# Patient Record
Sex: FEMALE | Race: WHITE | NOT HISPANIC OR LATINO | Employment: FULL TIME | URBAN - METROPOLITAN AREA
[De-identification: names, ages, dates, MRNs, and addresses within clinical notes are randomized per-mention and may not be internally consistent; named-entity substitution may affect disease eponyms.]

---

## 2017-01-11 ENCOUNTER — OFFICE VISIT (OUTPATIENT)
Dept: ENDOCRINOLOGY | Facility: CLINIC | Age: 37
End: 2017-01-11

## 2017-01-11 VITALS
DIASTOLIC BLOOD PRESSURE: 72 MMHG | OXYGEN SATURATION: 100 % | HEART RATE: 83 BPM | WEIGHT: 264 LBS | BODY MASS INDEX: 46.78 KG/M2 | SYSTOLIC BLOOD PRESSURE: 120 MMHG | HEIGHT: 63 IN

## 2017-01-11 DIAGNOSIS — E11.9 CONTROLLED TYPE 2 DIABETES MELLITUS WITHOUT COMPLICATION, WITHOUT LONG-TERM CURRENT USE OF INSULIN (HCC): ICD-10-CM

## 2017-01-11 DIAGNOSIS — E20.0 IDIOPATHIC HYPOPARATHYROIDISM (HCC): Primary | ICD-10-CM

## 2017-01-11 DIAGNOSIS — E78.5 HYPERLIPIDEMIA LDL GOAL <100: ICD-10-CM

## 2017-01-11 LAB
GLUCOSE BLDC GLUCOMTR-MCNC: 200 MG/DL (ref 70–130)
HBA1C MFR BLD: 7.2 %

## 2017-01-11 PROCEDURE — 99214 OFFICE O/P EST MOD 30 MIN: CPT | Performed by: INTERNAL MEDICINE

## 2017-01-11 PROCEDURE — 82947 ASSAY GLUCOSE BLOOD QUANT: CPT | Performed by: INTERNAL MEDICINE

## 2017-01-11 PROCEDURE — 83036 HEMOGLOBIN GLYCOSYLATED A1C: CPT | Performed by: INTERNAL MEDICINE

## 2017-01-11 RX ORDER — CALCITRIOL 0.25 UG/1
0.25 CAPSULE, LIQUID FILLED ORAL 2 TIMES DAILY
Qty: 60 CAPSULE | Refills: 11 | Status: SHIPPED | OUTPATIENT
Start: 2017-01-11 | End: 2018-01-10 | Stop reason: SDUPTHER

## 2017-01-11 RX ORDER — GLIPIZIDE 5 MG/1
5 TABLET ORAL 2 TIMES DAILY
Qty: 60 TABLET | Refills: 11 | Status: SHIPPED | OUTPATIENT
Start: 2017-01-11 | End: 2018-01-10 | Stop reason: SDUPTHER

## 2017-01-11 NOTE — PROGRESS NOTES
Chief Complaint   Patient presents with   • Diabetes     Follow UP    • Hypoparathyroidism     HPI:   Natty Fuentes is a 36 y.o.female who returns to [facility] Clinic for f/u evaluation of hypoparathyroidism, Type 2 DM, and hyperlipidemia. Last clinic visit 4/18/2016.  Her history is as follows:    Interim Events: no new medical problems    1) hypoparathyroidism:  - diagnosed in 2011 after presenting with symptomatic hypocalcemia  - etiology presumed infiltrative vs autoimmune hypoparathyroidism  - Taking calcitriol 0.25 mg BID, Caltrate + D 600 mg QID w/ food, occasionally needs extra tab for tingling  - Have asked on multiple visits, pt does not want to pursue Natpara  - last 24 hr urine calcium/Cr ratio: (5/23/2016) normal at 84    2) Type 2 DM with no associated complications at this time:  - diagnosed in 2013  - on metformin 1gm BID, tolerating but still with occasional loose stools. Did not feel XR helped her glucose control as well as plain  - on glipizide 5 mg BID AC  - Hgb A1C% 7.2 today  - no meter for review  (Januvia was too expensive. Pt did not tolerate Janumaet, pt did not tolerate Victoza)    3) Essential HTN  - managed by PCP  - on Lisinopril 40 mg daily, HCTZ 25 mg daily, Coreg 25 mg BID   - past evaluation (2016) for secondary causes of HTN showed no pathology (aldosterone/renin -normal, 1 mg dex suppression test - normal    4) hypertriglyceridemia: not clear how significant her TG's were in the past (ie >500 or < 500). Pt on fenofibrate prior to seeing me  - on fenofibrate 145 mg daily  - pravastatin 40 mg daily  - (8/17/2016) LDL 64 on pravastatin 40 mg daily    Other medical history: gout, SANYA on CPAP, GERD     Review of Systems   Constitutional: Negative.         Weight gain   HENT: Negative.    Eyes: Negative.    Respiratory: Negative.    Cardiovascular: Negative.    Gastrointestinal: Negative.    Endocrine: Negative.    Genitourinary: Negative.    Musculoskeletal: Positive for arthralgias.  "  Skin: Negative.    Allergic/Immunologic: Negative.    Neurological: Negative.    Hematological: Negative.    Psychiatric/Behavioral: Negative.        The following portions of the patient's history were reviewed and updated as appropriate: allergies, current medications, past family history, past medical history, past social history, past surgical history and problem list.    Visit Vitals   • /72   • Pulse 83   • Ht 63\" (160 cm)   • Wt 264 lb (120 kg)   • SpO2 100%   • BMI 46.77 kg/m2     Physical Exam   Constitutional: She is oriented to person, place, and time. She appears well-developed. No distress.   obese   HENT:   Head: Normocephalic.   Mouth/Throat: Oropharynx is clear and moist.   Eyes: Conjunctivae and EOM are normal. Pupils are equal, round, and reactive to light.   Neck: No tracheal deviation present. No thyromegaly present.   No palpable thyroid nodules     Cardiovascular: Normal rate, regular rhythm and normal heart sounds.    No murmur heard.  Pulmonary/Chest: Effort normal and breath sounds normal. No respiratory distress.   Abdominal: Soft. Bowel sounds are normal. She exhibits no mass. There is no tenderness.   Musculoskeletal: She exhibits no edema.   Lymphadenopathy:     She has no cervical adenopathy.   Neurological: She is alert and oriented to person, place, and time. No cranial nerve deficit.   Skin: Skin is warm and dry. She is not diaphoretic. No erythema.   No acanthosis nigricans   Psychiatric: She has a normal mood and affect. Her behavior is normal.   Vitals reviewed.    LABS/IMAGING:   Outside labs (01/09/2017):  CMP: Calcium 7.7, alb 4.2, LFT's WNL, Cr. 0.52    Office Visit on 01/11/2017   Component Date Value Ref Range Status   • Glucose 01/11/2017 200* 70 - 130 mg/dL Final   • Hemoglobin A1C 01/11/2017 7.2  % Final     ASSESSMENT/PLAN:  1) hypoparathyroidism:   - d/w pt that goal calcium is 7.0 - 8.5 to minimize calcium-phosphorus product deposition  - continue calcitriol " 0.25 mg BID, Caltrate + D 600 mg QID w/ food  - pt is not interested in Natpara  - pt to have renal function panel, 24 hr urine calcium/creatinine prior to next visit (lab slip given to pt)    2) Type 2 DM with no associated complications: fair control, Hgb A1C% today 7.2  - pt does not want to try invokana  (Januvia was too expensive. Pt did not tolerate Janumaet, pt did not tolerate Victoza)  - continue metformin 1 gm BID  - continue glipizide 5 mg BID AC    3) essential HTN: controlled on current medications  - on Lisinopril 40 mg daily, HCTZ 25 mg daily, Coreg 25 mg BID     4) mixed hyperlipidemia:  - will continue pravastatin 40 mg daily given her h/o DM   -  not clear how significant her TG's were in the past (ie >500 or < 500). Pt on fenofibrate prior to seeing me. Asked pt to stop fenofibrate 145 mg so I can evaluate if truly indicated  - check lipid panel before next visit     RTC 6 months

## 2017-01-11 NOTE — MR AVS SNAPSHOT
Natty MARIE Fuentes   1/11/2017 2:40 PM   Office Visit    Dept Phone:  [phone]   Encounter #:  [id number]    Provider:  Therese Cintron MD   Department:  [facility] INTERNAL MEDICINE AND ENDOCRINOLOGY                Your Full Care Plan              Today's Medication Changes          These changes are accurate as of: 1/11/17  1:56 PM.  If you have any questions, ask your nurse or doctor.               Medication(s)that have changed:     pravastatin 40 MG tablet   Commonly known as:  PRAVACHOL   Take 1 tablet by mouth daily for 360 days.   What changed:  Another medication with the same name was removed. Continue taking this medication, and follow the directions you see here.            Where to Get Your Medications      These medications were sent to [place], [place] - [address] [place] [address] [phone] [facility] [phone]   [address] [place] [address]     Phone:  [phone]     calcitriol 0.25 MCG capsule    glipiZIDE 5 MG tablet                  Your Updated Medication List          This list is accurate as of: 1/11/17  1:56 PM.  Always use your most recent med list.                allopurinol 300 MG tablet   Commonly known as:  ZYLOPRIM       calcitriol 0.25 MCG capsule   Commonly known as:  ROCALTROL   Take 1 capsule by mouth 2 (Two) Times a Day.       CALTRATE 600 PLUS-VIT D PO       carvedilol 25 MG tablet   Commonly known as:  COREG   Take 1 tablet by mouth 2 (two) times a day with meals for 360 days.       fenofibrate 145 MG tablet   Commonly known as:  TRICOR   Take 1 tablet by mouth daily for 360 days.       glipiZIDE 5 MG tablet   Commonly known as:  GLUCOTROL   Take 1 tablet by mouth 2 (Two) Times a Day. Take 30 minutes before breakfast and before dinner       hydrochlorothiazide 25 MG tablet   Commonly known as:  HYDRODIURIL   Take 1 tablet by mouth daily for 360 days.       lisinopril 40 MG tablet   Commonly known as:  PRINIVIL,ZESTRIL   Take 1 tablet  by mouth daily for 360 days.       metFORMIN 1000 MG tablet   Commonly known as:  GLUCOPHAGE       pravastatin 40 MG tablet   Commonly known as:  PRAVACHOL   Take 1 tablet by mouth daily for 360 days.       PROTONIX 40 MG EC tablet   Generic drug:  pantoprazole               We Performed the Following     Basic metabolic panel     POC Glucose Fingerstick     POC Glycosylated Hemoglobin (Hb A1C)       You Were Diagnosed With        Codes Comments    Idiopathic hypoparathyroidism    -  Primary ICD-10-CM: E20.0  ICD-9-CM: 252.1     Controlled type 2 diabetes mellitus without complication, without long-term current use of insulin     ICD-10-CM: E11.9  ICD-9-CM: 250.00     Localized edema     ICD-10-CM: R60.0  ICD-9-CM: 782.3     Hyperlipidemia LDL goal <100     ICD-10-CM: E78.5  ICD-9-CM: 272.4     Uncontrolled type 2 diabetes mellitus without complication, without long-term current use of insulin     ICD-10-CM: E11.65  ICD-9-CM: 250.02       Instructions     None    Patient Instructions History      Upcoming Appointments     Visit Type Date Time Department    FOLLOW UP 1/11/2017  2:40 PM [facility]    FOLLOW UP 7/10/2017  1:00 PM [facility]      MyChart Signup     Our records indicate that you have an active [employer] account.    You can view your After Visit Summary by going to [employer] and logging in with your [employer] username and password.  If you don't have a [employer] username and password but a parent or guardian has access to your record, the parent or guardian should login with their own [employer] username and password and access your record to view the After Visit Summary.    If you have questions, you can email "[email or web address] or call [phone] to talk to our [employer] staff.  Remember, [employer] is NOT to be used for urgent needs.  For medical emergencies, dial 911.               Other Info from Your Visit           Your Appointments     Jan 11, 2017  2:40 PM EST  "  Follow Up with Therese Cintron MD   [facility] INTERNAL MEDICINE AND ENDOCRINOLOGY (--)    [address]  [facility] [address] [phone]           Arrive 15 minutes prior to appointment.            Jul 10, 2017  1:00 PM EDT   Follow Up with Therese Cintron MD   [facility] INTERNAL MEDICINE AND ENDOCRINOLOGY (--)    [address]  [facility] [id number] [phone]           Arrive 15 minutes prior to appointment.              Allergies     Penicillins Allergy Rash    Rocephin [Ceftriaxone] Allergy Rash      Reason for Visit     Diabetes Follow UP     Hypoparathyroidism           Vital Signs     Blood Pressure Pulse Height Weight Oxygen Saturation Body Mass Index    120/72 83 63\" (160 cm) 264 lb (120 kg) 100% 46.77 kg/m2    Smoking Status                   Never Smoker           Problems and Diagnoses Noted     Hyperlipidemia LDL goal <100    Disorder of parathyroid gland    Controlled type 2 diabetes mellitus without complication, without long-term current use of insulin        Accumulation of fluid in tissues        Uncontrolled type 2 diabetes mellitus without complication, without long-term current use of insulin          Results     POC Glucose Fingerstick      Component Value Standard Range & Units    Glucose 200 70 - 130 mg/dL                POC Glycosylated Hemoglobin (Hb A1C)      Component Value Standard Range & Units    Hemoglobin A1C 7.2 %                    "

## 2017-07-03 LAB
ALBUMIN SERPL-MCNC: 3.7 G/DL (ref 3.5–5.5)
AMBIG ABBREV LP DEFAULT: NORMAL
AMBIG ABBREV RP10 DEFAULT: NORMAL
BUN SERPL-MCNC: 8 MG/DL (ref 6–20)
BUN/CREAT SERPL: 12 (ref 9–23)
CALCIUM SERPL-MCNC: 7.4 MG/DL (ref 8.7–10.2)
CHLORIDE SERPL-SCNC: 99 MMOL/L (ref 96–106)
CHOLEST SERPL-MCNC: 172 MG/DL (ref 100–199)
CO2 SERPL-SCNC: 27 MMOL/L (ref 18–29)
CREAT SERPL-MCNC: 0.65 MG/DL (ref 0.57–1)
GLUCOSE SERPL-MCNC: 192 MG/DL (ref 65–99)
HDLC SERPL-MCNC: 31 MG/DL
LDLC SERPL CALC-MCNC: ABNORMAL MG/DL (ref 0–99)
PHOSPHATE SERPL-MCNC: 5.4 MG/DL (ref 2.5–4.5)
POTASSIUM SERPL-SCNC: 3.4 MMOL/L (ref 3.5–5.2)
SODIUM SERPL-SCNC: 139 MMOL/L (ref 134–144)
TRIGL SERPL-MCNC: 576 MG/DL (ref 0–149)
VLDLC SERPL CALC-MCNC: ABNORMAL MG/DL (ref 5–40)

## 2017-07-04 LAB
CALCIUM 24H UR-MCNC: 10.7 MG/DL
CALCIUM 24H UR-MRATE: 64.2 MG/24 HR (ref 100–300)
CREAT 24H UR-MRATE: 1729 MG/24 HR (ref 800–1800)
CREAT UR-MCNC: 247 MG/DL

## 2017-07-10 ENCOUNTER — OFFICE VISIT (OUTPATIENT)
Dept: ENDOCRINOLOGY | Facility: CLINIC | Age: 37
End: 2017-07-10

## 2017-07-10 VITALS
HEIGHT: 63 IN | OXYGEN SATURATION: 100 % | DIASTOLIC BLOOD PRESSURE: 82 MMHG | BODY MASS INDEX: 46 KG/M2 | WEIGHT: 259.6 LBS | SYSTOLIC BLOOD PRESSURE: 130 MMHG | HEART RATE: 78 BPM

## 2017-07-10 DIAGNOSIS — E78.5 HYPERLIPIDEMIA LDL GOAL <100: ICD-10-CM

## 2017-07-10 DIAGNOSIS — E20.0 IDIOPATHIC HYPOPARATHYROIDISM (HCC): Primary | ICD-10-CM

## 2017-07-10 DIAGNOSIS — E11.9 TYPE 2 DIABETES MELLITUS WITHOUT COMPLICATION, WITHOUT LONG-TERM CURRENT USE OF INSULIN (HCC): ICD-10-CM

## 2017-07-10 LAB
GLUCOSE BLDC GLUCOMTR-MCNC: 108 MG/DL (ref 70–130)
HBA1C MFR BLD: 7.3 %

## 2017-07-10 PROCEDURE — 82947 ASSAY GLUCOSE BLOOD QUANT: CPT | Performed by: INTERNAL MEDICINE

## 2017-07-10 PROCEDURE — 83036 HEMOGLOBIN GLYCOSYLATED A1C: CPT | Performed by: INTERNAL MEDICINE

## 2017-07-10 PROCEDURE — 99214 OFFICE O/P EST MOD 30 MIN: CPT | Performed by: INTERNAL MEDICINE

## 2017-07-10 RX ORDER — FENOFIBRATE 145 MG/1
145 TABLET, COATED ORAL DAILY
Qty: 90 TABLET | Refills: 3 | Status: SHIPPED | OUTPATIENT
Start: 2017-07-10 | End: 2018-07-11 | Stop reason: SDUPTHER

## 2017-07-10 RX ORDER — ALLOPURINOL 300 MG/1
300 TABLET ORAL DAILY
COMMUNITY
Start: 2016-04-18

## 2017-07-10 RX ORDER — PANTOPRAZOLE SODIUM 40 MG/1
40 TABLET, DELAYED RELEASE ORAL DAILY
COMMUNITY
Start: 2016-04-18 | End: 2023-03-13 | Stop reason: ALTCHOICE

## 2017-07-10 RX ORDER — CARVEDILOL PHOSPHATE 20 MG/1
CAPSULE, EXTENDED RELEASE ORAL
COMMUNITY
Start: 2016-04-18 | End: 2017-07-10 | Stop reason: DRUGHIGH

## 2017-07-10 NOTE — PROGRESS NOTES
Chief Complaint   Patient presents with   • Diabetes     Follow UP      HPI:   Natty Fuentes is a 36 y.o.female who returns to Endocrine Clinic for f/u evaluation of hypoparathyroidism, Type 2 DM, and hyperlipidemia. Last clinic visit 01/11/2017.  Her history is as follows:    Interim Events: had food allergy testing recently and is allergic to multiple foods    1) idiopathic hypoparathyroidism:  - diagnosed in 2011 after presenting with symptomatic hypocalcemia  - etiology presumed infiltrative vs autoimmune hypoparathyroidism  - Taking calcitriol 0.25 mg BID, Caltrate + D 600 mg QID w/ food, occasionally needs extra tab for tingling  - Have asked on multiple visits, pt does not want to pursue Natpara  - last 24 hr urine calcium/Cr ratio: (07/2017) normal 0.003  - (07/2017) renal fxn panel: K+ 3.4, Calcium 7.4, phos 5.4, Alb 3.7    2) Type 2 DM with no associated complications at this time:  - diagnosed in 2013  - on metformin 1gm BID  - on glipizide 5 mg BID AC - but mainly takes 5 mg ac dinner, skips morning dose on work days  - Hgb A1C% 7.3 today  - no meter for review  (Januvia was too expensive. Pt did not tolerate Janumaet, pt did not tolerate Victoza)    3) Essential HTN  - managed by PCP  - on Lisinopril 40 mg daily, HCTZ 25 mg daily, Coreg 25 mg BID   - past evaluation (2016) for secondary causes of HTN showed no pathology (aldosterone/renin -normal, 1 mg dex suppression test - normal    4) mixed hyperlipidemia:   Lipid Panel (07/2017): Tchol 172,  (off fenofibrate), HDL 31, LDL not calc   - pravastatin 40 mg daily  - (8/17/2016) LDL 64 on pravastatin 40 mg daily    Other medical history: gout, SANYA on CPAP, GERD     Review of Systems   Constitutional: Negative.         Weight loss   HENT: Negative.    Eyes: Negative.    Respiratory: Negative.    Cardiovascular: Negative.    Gastrointestinal: Negative.    Endocrine: Negative.    Genitourinary: Negative.    Musculoskeletal: Positive for arthralgias.  "  Skin: Negative.    Allergic/Immunologic: Negative.    Neurological: Negative.    Hematological: Negative.    Psychiatric/Behavioral: Negative.      The following portions of the patient's history were reviewed and updated as appropriate: allergies, current medications, past family history, past medical history, past social history, past surgical history and problem list.    /82  Pulse 78  Ht 63\" (160 cm)  Wt 259 lb 9.6 oz (118 kg)  SpO2 100%  BMI 45.99 kg/m2  Physical Exam   Constitutional: She is oriented to person, place, and time. She appears well-developed. No distress.   obese   HENT:   Head: Normocephalic.   Mouth/Throat: Oropharynx is clear and moist.   Eyes: Conjunctivae and EOM are normal. Pupils are equal, round, and reactive to light.   Neck: No tracheal deviation present. No thyromegaly present.   No palpable thyroid nodules     Cardiovascular: Normal rate, regular rhythm and normal heart sounds.    No murmur heard.  Pulmonary/Chest: Effort normal and breath sounds normal. No respiratory distress.   Abdominal: Soft. Bowel sounds are normal. She exhibits no mass. There is no tenderness.   Musculoskeletal: She exhibits no edema.   Lymphadenopathy:     She has no cervical adenopathy.   Neurological: She is alert and oriented to person, place, and time. No cranial nerve deficit.   Skin: Skin is warm and dry. She is not diaphoretic. No erythema.   No acanthosis nigricans   Psychiatric: She has a normal mood and affect. Her behavior is normal.   Vitals reviewed.    LABS/IMAGING:  Outside labs reviewed and summarized in HPI    Office Visit on 07/10/2017   Component Date Value Ref Range Status   • Glucose 07/10/2017 108  70 - 130 mg/dL Final   • Hemoglobin A1C 07/10/2017 7.3  % Final     ASSESSMENT/PLAN:  1) idiopathic hypoparathyroidism:   - d/w pt that goal calcium is 7.0 - 8.5 to minimize calcium-phosphorus product deposition  - continue calcitriol 0.25 mg BID, Caltrate + D 600 mg QID w/ food  - " pt is not interested in Natpara  - pt to have renal function panel, 24 hr urine calcium/creatinine at goal at this time    2) Type 2 DM with no associated complications: fair control, Hgb A1C% today 7.3  - pt does not want to try invokana  (Januvia was too expensive. Pt did not tolerate Janumaet, pt did not tolerate Victoza)  - continue metformin 1 gm BID  - continue glipizide 5 mg BID AC, instructed pt to take 2.5 mg with breakfast on work days, stop drinking or cut down regular sodas    3) mixed hyperlipidemia:  - will continue pravastatin 40 mg daily given her h/o DM   - restart fenofibrate 145 mg daily     4) essential HTN: controlled on current medications  - on Lisinopril 40 mg daily, HCTZ 25 mg daily, Coreg 25 mg BID        RTC 6 months

## 2018-01-10 ENCOUNTER — OFFICE VISIT (OUTPATIENT)
Dept: ENDOCRINOLOGY | Facility: CLINIC | Age: 38
End: 2018-01-10

## 2018-01-10 VITALS
HEART RATE: 79 BPM | WEIGHT: 251 LBS | OXYGEN SATURATION: 98 % | DIASTOLIC BLOOD PRESSURE: 74 MMHG | SYSTOLIC BLOOD PRESSURE: 126 MMHG | BODY MASS INDEX: 44.46 KG/M2

## 2018-01-10 DIAGNOSIS — E78.2 MIXED HYPERLIPIDEMIA: ICD-10-CM

## 2018-01-10 DIAGNOSIS — IMO0001 UNCONTROLLED TYPE 2 DIABETES MELLITUS WITHOUT COMPLICATION, WITHOUT LONG-TERM CURRENT USE OF INSULIN: ICD-10-CM

## 2018-01-10 DIAGNOSIS — E20.0 IDIOPATHIC HYPOPARATHYROIDISM (HCC): Primary | ICD-10-CM

## 2018-01-10 DIAGNOSIS — I10 ESSENTIAL HYPERTENSION: ICD-10-CM

## 2018-01-10 PROCEDURE — 99214 OFFICE O/P EST MOD 30 MIN: CPT | Performed by: INTERNAL MEDICINE

## 2018-01-10 RX ORDER — CALCITRIOL 0.25 UG/1
0.25 CAPSULE, LIQUID FILLED ORAL
Qty: 60 CAPSULE | Refills: 11 | Status: SHIPPED | OUTPATIENT
Start: 2018-01-10 | End: 2019-01-17 | Stop reason: SDUPTHER

## 2018-01-10 RX ORDER — GLIPIZIDE 5 MG/1
5 TABLET ORAL
Qty: 60 TABLET | Refills: 11 | Status: SHIPPED | OUTPATIENT
Start: 2018-01-10 | End: 2018-07-11 | Stop reason: SDUPTHER

## 2018-01-10 NOTE — PROGRESS NOTES
Chief Complaint   Patient presents with   • Hypoparathyroidism     follow up   • Type 2 diabetes     follow up, A1C 8.8 on 12/21/17     HPI:   Natty Fuentes is a 37 y.o.female who returns to Endocrine Clinic for f/u evaluation of hypoparathyroidism, Type 2 DM, and hyperlipidemia. Last clinic visit 07/10/2017.  Her history is as follows:    Interim Events:   - reports more food excursions, regular soda leading to more hyperglycemia    1) idiopathic hypoparathyroidism:  - diagnosed in 2011 after presenting with symptomatic hypocalcemia  - etiology presumed infiltrative vs autoimmune hypoparathyroidism  - Taking calcitriol 0.25 mg BID, Caltrate + D 600 mg QID w/ food, occasionally needs extra tab for tingling  - Have asked on multiple visits, pt does not want to pursue Natpara  - last 24 hr urine calcium/Cr ratio: (07/2017) normal 0.003  - (01/2018) CMP: K+ 3.6, Calcium 7.8, Alb 4.1    2) Type 2 DM with no associated complications at this time:  - diagnosed in 2013  - on metformin 1gm BID  - on glipizide 5 mg BID AC   - Hgb A1C% 8.8 12/21/2017  - no meter for review  (Januvia was too expensive. Pt did not tolerate Janumet, pt did not tolerate Victoza), does not want to try SGLT-2 inhibitor    DM Health Maintenance:  Ophtho: Last visit - 2017, no retinopathy per pt  Podiatry: Last visit - no recent visit  Monofilament / Foot exam: due  Lipids: see below  Urine albumin/Cr ratio: (12/2017) high 269.1  TSH: (2016) normal at 1.040  Aspirin: n/a    3) Essential HTN  - managed by PCP  - pt taking lisinopril 4 mg daily, carvedilol 25 mg BID  - Takes HCTZ as needed for lower extremity edema  - past evaluation (2016) for secondary causes of HTN showed no endocrine pathology (aldosterone/renin -normal, 1 mg dex suppression test -normal)    4) mixed hyperlipidemia:   Lipid Panel (12/2017): Tchol 227, TG 1557(off fenofibrate), HDL 21, LDL not calc   - pravastatin 40 mg, fenofibrate 145 mg daily  - (8/17/2016) LDL 64 on  pravastatin 40 mg daily    Other medical history: gout, SANYA on CPAP, GERD     Review of Systems   Constitutional: Negative.         Weight loss   HENT: Negative.    Eyes: Negative.    Respiratory: Negative.    Cardiovascular: Negative.    Gastrointestinal: Negative.    Endocrine: Negative.    Genitourinary: Negative.    Musculoskeletal: Positive for arthralgias.   Skin: Negative.    Allergic/Immunologic: Negative.    Neurological: Negative.    Hematological: Negative.    Psychiatric/Behavioral: Negative.      The following portions of the patient's history were reviewed and updated as appropriate: allergies, current medications, past family history, past medical history, past social history, past surgical history and problem list.    /74  Pulse 79  Wt 114 kg (251 lb)  SpO2 98%  BMI 44.46 kg/m2  Physical Exam   Constitutional: She is oriented to person, place, and time. She appears well-developed. No distress.   obese   HENT:   Head: Normocephalic.   Mouth/Throat: Oropharynx is clear and moist.   Eyes: Conjunctivae and EOM are normal. Pupils are equal, round, and reactive to light.   Neck: No tracheal deviation present. No thyromegaly present.   No palpable thyroid nodules     Cardiovascular: Normal rate, regular rhythm and normal heart sounds.    No murmur heard.  Pulmonary/Chest: Effort normal and breath sounds normal. No respiratory distress.   Abdominal: Soft. Bowel sounds are normal. She exhibits no mass. There is no tenderness.   Musculoskeletal: She exhibits no edema.   Lymphadenopathy:     She has no cervical adenopathy.   Neurological: She is alert and oriented to person, place, and time. No cranial nerve deficit.   Skin: Skin is warm and dry. She is not diaphoretic. No erythema.   No acanthosis nigricans   Psychiatric: She has a normal mood and affect. Her behavior is normal.   Vitals reviewed.    LABS/IMAGING:  Outside labs reviewed and summarized in HPI    ASSESSMENT/PLAN:  1) idiopathic  hypoparathyroidism: controlled  - d/w pt that goal calcium is 7.0 - 8.5 to minimize calcium-phosphorus product deposition  - continue calcitriol 0.25 mg BID, Caltrate + D 600 mg QID w/ food  - pt is not interested in Natpara  - 24 hr urine calcium/creatinine due 07/2018    2) Type 2 DM with microalbuminuria: uncontrolled, recent Hgb A1C% 8.8  - pt does not want to try invokana  (Januvia was too expensive. Pt did not tolerate Janumaet, pt did not tolerate Victoza)  - continue metformin 1 gm BID  - advised pt to increase glipizide to 10 mg BID AC. She does not want to, but would rather work on her diet.   - instructed pt to stop drinking regular sodas    3) mixed hyperlipidemia:  - continue pravastatin 40 mg daily given her h/o DM   - continue fenofibrate 145 mg daily consistently     4) essential HTN: controlled  - on Lisinopril 40 mg daily, HCTZ 25 mg prn, and Coreg 25 mg BID     RTC 6 months

## 2018-01-28 RX ORDER — PRAVASTATIN SODIUM 40 MG
40 TABLET ORAL DAILY
Qty: 90 TABLET | Refills: 3 | Status: SHIPPED | OUTPATIENT
Start: 2018-01-28 | End: 2020-04-22 | Stop reason: SDUPTHER

## 2018-01-28 RX ORDER — LISINOPRIL 40 MG/1
20 TABLET ORAL DAILY
COMMUNITY
End: 2022-05-01

## 2018-01-28 RX ORDER — CARVEDILOL 25 MG/1
25 TABLET ORAL 2 TIMES DAILY WITH MEALS
COMMUNITY
End: 2019-09-03

## 2018-01-28 RX ORDER — PRAVASTATIN SODIUM 40 MG
40 TABLET ORAL DAILY
COMMUNITY
End: 2018-01-28 | Stop reason: SDUPTHER

## 2018-01-28 RX ORDER — HYDROCHLOROTHIAZIDE 25 MG/1
25 TABLET ORAL AS NEEDED
COMMUNITY
End: 2021-04-19 | Stop reason: SDUPTHER

## 2018-05-17 ENCOUNTER — TELEPHONE (OUTPATIENT)
Dept: INTERNAL MEDICINE | Facility: CLINIC | Age: 38
End: 2018-05-17

## 2018-05-17 NOTE — TELEPHONE ENCOUNTER
MISS IVORY CALLED THIS EVENING WANTING TO SPEAK WITH DR MEDEL. SHE WAS OUT OF THE OFFICE.   MISS MAUROLOR WANTED TO SPEAK WITH HER ABOUT HER SPLINE AND LIVER BEING ENLARGED AS WELL AS HER THYROID BEING SWOLLEN.   I SENT DR MEDEL A TEXT MESSAGE AS IT SOUNDED URGENT. DR MEDEL HAS INSTRUCTED THE P.A.R STAFF TO CALL HER IF ANYTHING SEEMS URGENT.   DR MEDEL SPOKE WITH THE PATIENT  AND SHE JUST WANTED TO INFORM HER OF HER RESULTS.

## 2018-06-01 ENCOUNTER — TELEPHONE (OUTPATIENT)
Dept: INTERNAL MEDICINE | Facility: CLINIC | Age: 38
End: 2018-06-01

## 2018-06-01 NOTE — TELEPHONE ENCOUNTER
PT CALLED IN TO LEAVE A MESSAGE FOR DR. MEDEL OR AVAILABLE MA. THE PT HAD A SCHEDULED APT FOR A 3-4 MO F/U WITH DR. MEDEL ON THE 30th BUT DUE TO PERSONAL REASONS HAD TO MISS APT. THE PT WANTED TO SEE IF DR. MEDEL RECOMMENDED THE PT WAIT A FEW MONTHS OUT TO SEE DR. MEDEL OR IF IT WAS RECOMMENDED THE PT COME IN EARLIER TO BE SEEN BY PA. PT BEST CALL BACK # [phone]

## 2018-06-04 NOTE — TELEPHONE ENCOUNTER
Okay to schedule the patient in June or July with me only. Okay to overbook. July, please call patient to make the appointment.    Thanks  Therese Cintron MD

## 2018-07-07 LAB
ALBUMIN SERPL-MCNC: 4.1 G/DL (ref 3.5–5.5)
ALBUMIN/CREAT UR: 10.4 MG/G CREAT (ref 0–30)
AMBIG ABBREV LP DEFAULT: NORMAL
AMBIG ABBREV RP10 DEFAULT: NORMAL
BUN SERPL-MCNC: 10 MG/DL (ref 6–20)
BUN/CREAT SERPL: 16 (ref 9–23)
CALCIUM SERPL-MCNC: 8.1 MG/DL (ref 8.7–10.2)
CHLORIDE SERPL-SCNC: 101 MMOL/L (ref 96–106)
CHOLEST SERPL-MCNC: 208 MG/DL (ref 100–199)
CO2 SERPL-SCNC: 25 MMOL/L (ref 20–29)
CREAT SERPL-MCNC: 0.63 MG/DL (ref 0.57–1)
CREAT UR-MCNC: 140.8 MG/DL
GLUCOSE SERPL-MCNC: 257 MG/DL (ref 65–99)
HBA1C MFR BLD: 7.9 % (ref 4.8–5.6)
HDLC SERPL-MCNC: 29 MG/DL
LDLC SERPL CALC-MCNC: ABNORMAL MG/DL (ref 0–99)
MICROALBUMIN UR-MCNC: 14.7 UG/ML
PHOSPHATE SERPL-MCNC: 4.4 MG/DL (ref 2.5–4.5)
POTASSIUM SERPL-SCNC: 4.1 MMOL/L (ref 3.5–5.2)
SODIUM SERPL-SCNC: 136 MMOL/L (ref 134–144)
TRIGL SERPL-MCNC: 985 MG/DL (ref 0–149)
TSH SERPL DL<=0.005 MIU/L-ACNC: 1.19 UIU/ML (ref 0.45–4.5)
VLDLC SERPL CALC-MCNC: ABNORMAL MG/DL (ref 5–40)

## 2018-07-11 ENCOUNTER — OFFICE VISIT (OUTPATIENT)
Dept: ENDOCRINOLOGY | Facility: CLINIC | Age: 38
End: 2018-07-11

## 2018-07-11 VITALS
WEIGHT: 259 LBS | SYSTOLIC BLOOD PRESSURE: 110 MMHG | BODY MASS INDEX: 45.89 KG/M2 | DIASTOLIC BLOOD PRESSURE: 70 MMHG | HEIGHT: 63 IN | OXYGEN SATURATION: 98 % | HEART RATE: 84 BPM

## 2018-07-11 DIAGNOSIS — E20.0 IDIOPATHIC HYPOPARATHYROIDISM (HCC): Primary | ICD-10-CM

## 2018-07-11 DIAGNOSIS — IMO0001 UNCONTROLLED TYPE 2 DIABETES MELLITUS WITHOUT COMPLICATION, WITHOUT LONG-TERM CURRENT USE OF INSULIN: ICD-10-CM

## 2018-07-11 DIAGNOSIS — E78.2 MIXED HYPERLIPIDEMIA: ICD-10-CM

## 2018-07-11 PROCEDURE — 99214 OFFICE O/P EST MOD 30 MIN: CPT | Performed by: INTERNAL MEDICINE

## 2018-07-11 RX ORDER — GLIPIZIDE 5 MG/1
10 TABLET ORAL
Qty: 120 TABLET | Refills: 11 | Status: SHIPPED | OUTPATIENT
Start: 2018-07-11 | End: 2019-01-17 | Stop reason: SDUPTHER

## 2018-07-11 RX ORDER — ASPIRIN 81 MG/1
81 TABLET ORAL DAILY
COMMUNITY
End: 2022-09-09 | Stop reason: HOSPADM

## 2018-07-11 RX ORDER — FENOFIBRATE 145 MG/1
145 TABLET, COATED ORAL DAILY
Qty: 90 TABLET | Refills: 3 | Status: SHIPPED | OUTPATIENT
Start: 2018-07-11 | End: 2019-08-01 | Stop reason: SDUPTHER

## 2018-07-11 NOTE — PROGRESS NOTES
Chief Complaint   Patient presents with   • Idiopathic hypoparathyroidism     f/u   • type 2 diabetes   • Hyperlipidemia     HPI:   Natty Fuentes is a 37 y.o.female who returns to [facility] Clinic for f/u evaluation of hypoparathyroidism, Type 2 DM, and hyperlipidemia. Last clinic visit 01/10/2018.  Her history is as follows:    Interim Events:   - was seen by hematology for enlarged spleen. Per pt no clear etiology  - has been compliant with all her medications  - has been trying to eat a lower carb diet but frustrated with lack of weight loss and increased TGs    1) idiopathic hypoparathyroidism:  - diagnosed in 2011 after presenting with symptomatic hypocalcemia  - etiology presumed infiltrative vs autoimmune hypoparathyroidism  - Taking calcitriol 0.25 mg BID, Caltrate + D 600 mg QID w/ food, occasionally needs extra tab for tingling  - Have asked on multiple visits, pt does not want to pursue Natpara  - last 24 hr urine calcium/Cr ratio: (07/2017) normal 0.003  - (07/2018) CMP: K+ 4.1, Calcium 8.1, Alb 4.1    2) Type 2 DM with no associated complications at this time:  - diagnosed in 2013  - on metformin 1gm BID  - on glipizide 5 mg BID AC   - Hgb A1C% 7.9 07/2018  - no meter for review  (Januvia was too expensive. Pt did not tolerate Janumet, pt did not tolerate Victoza), does not want to try once weekly GLP-1), does not want to try SGLT-2 inhibitor    DM Health Maintenance:  Ophtho: Last visit - 2017, no retinopathy per pt  Podiatry: Last visit - no recent visit  Monofilament / Foot exam: due  Lipids: see below  Urine microalbumin/Cr ratio: (07/2018) 10.4 - normal  TSH: (07/2018) normal at 1.190  Aspirin: n/a    3) Essential HTN  - managed by PCP  - pt taking lisinopril 40 mg daily, carvedilol 25 mg BID  - Takes HCTZ 25 mg daily  - past evaluation (2016) for secondary causes of HTN showed no endocrine pathology (aldosterone/renin -normal, 1 mg dex suppression test -normal)    4) mixed hyperlipidemia:  "  Lipid Panel (07/2018): Tchol 208, (on fenofibrate), HDL 29, LDL not calc   - pravastatin 40 mg, fenofibrate 145 mg daily  - (8/17/2016) LDL 64 on pravastatin 40 mg daily    Other medical history: gout, SANYA on CPAP, GERD     Review of Systems   Constitutional: Negative.         Weight stable   HENT: Negative.    Eyes: Negative.    Respiratory: Negative.    Cardiovascular: Negative.    Gastrointestinal: Negative.    Endocrine: Negative.    Genitourinary: Negative.    Musculoskeletal: Positive for arthralgias.   Skin: Negative.    Allergic/Immunologic: Negative.    Neurological: Negative.    Hematological: Negative.    Psychiatric/Behavioral: Negative.      The following portions of the patient's history were reviewed and updated as appropriate: allergies, current medications, past family history, past medical history, past social history, past surgical history and problem list.    /70   Pulse 84   Ht 160 cm (63\")   Wt 117 kg (259 lb)   SpO2 98%   BMI 45.88 kg/m²   Physical Exam   Constitutional: She is oriented to person, place, and time. She appears well-developed. No distress.   obese   HENT:   Head: Normocephalic.   Mouth/Throat: Oropharynx is clear and moist.   Eyes: Conjunctivae and EOM are normal. Pupils are equal, round, and reactive to light.   Neck: No tracheal deviation present. No thyromegaly present.   No palpable thyroid nodules     Cardiovascular: Normal rate, regular rhythm and normal heart sounds.    No murmur heard.  Pulmonary/Chest: Effort normal and breath sounds normal. No respiratory distress.   Abdominal: Soft. Bowel sounds are normal. She exhibits no mass. There is no tenderness.   Musculoskeletal: She exhibits no edema.   Lymphadenopathy:     She has no cervical adenopathy.   Neurological: She is alert and oriented to person, place, and time. No cranial nerve deficit.   Skin: Skin is warm and dry. She is not diaphoretic. No erythema.   No acanthosis nigricans   Psychiatric: " She has a normal mood and affect. Her behavior is normal.   Vitals reviewed.    LABS/IMAGING:  Outside labs reviewed and summarized in HPI    ASSESSMENT/PLAN:  1) idiopathic hypoparathyroidism: controlled  - d/w pt that goal calcium is 7.0 - 8.5 to minimize calcium-phosphorus product deposition  - continue calcitriol 0.25 mg BID, Caltrate + D 600 mg QID w/ food  - pt is not interested in Natpara  - 24 hr urine calcium/creatinine due 2018    2) Type 2 DM w/o complication at this time: uncontrolled, recent Hgb A1C% 7.9  - pt does not want to try invokana  (Januvia was too expensive. Pt did not tolerate Janumaet, pt did not tolerate Victoza) does not want to try once weekly GLP-1  - continue metformin 1 gm BID  - advised pt to increase glipizide to 10 mg BID AC    3) mixed hyperlipidemia: uncontrolled triglycerides  - continue pravastatin 40 mg daily given her h/o DM   - continue fenofibrate 145 mg daily consistently   - offered Rx for Rx fish oil  (Lovaza or Vascepa) pt declined due to cost. Pt does not want to take OTC fish oil    4) essential HTN: controlled  - on Lisinopril 40 mg daily, HCTZ 25 mg daily, and Coreg 25 mg BID     RTC 6 months

## 2019-01-07 ENCOUNTER — TELEPHONE (OUTPATIENT)
Dept: INTERNAL MEDICINE | Facility: CLINIC | Age: 39
End: 2019-01-07

## 2019-01-07 NOTE — TELEPHONE ENCOUNTER
PT CALLED AND STATED THAT SHE HAS AN APPT ON 01/14 FOR HER THYROID; SHE STATED THAT SHE NORMALLY HAS LAB WORK DONE 1 WEEK PRIOR TO APPT; SINCE SHE LIVES SO FAR AWAY, SHE HAS THEM DONE AT A LOCAL LAB; SHE IS WANTING TO KNOW IF SHE NEEDS TO HAVE THEM DONE AND IF ORDERS CAN BE FAXED TO OFFICE; PLEASE CALL  PT AND ADVISE [phone]

## 2019-01-08 DIAGNOSIS — E20.0 IDIOPATHIC HYPOPARATHYROIDISM (HCC): ICD-10-CM

## 2019-01-08 DIAGNOSIS — E78.2 MIXED HYPERLIPIDEMIA: ICD-10-CM

## 2019-01-08 DIAGNOSIS — E11.9 TYPE 2 DIABETES MELLITUS WITHOUT COMPLICATION, WITHOUT LONG-TERM CURRENT USE OF INSULIN (HCC): Primary | ICD-10-CM

## 2019-01-08 NOTE — TELEPHONE ENCOUNTER
Ramsey,     I have ordered labs for Natty ,(CMP, A1C%, Lipid panel, and 24 hour urine for calcium and creatinine). Please let her know to complete these labs before her visit and find out where to fax the order slip.    Thanks  MD

## 2019-01-08 NOTE — TELEPHONE ENCOUNTER
Called patient on mobile and voice mail has not been set up. Called home number LVM for patient to return the call to go over labs and to get a fax number to send to patient. Office number given.

## 2019-01-09 NOTE — TELEPHONE ENCOUNTER
LVM for patient to return the call concerning labs. Office number given. I was able to get vm on home number

## 2019-01-09 NOTE — TELEPHONE ENCOUNTER
Tried calling patient cell and home number with no answer and no voice mail. Will contact patient later

## 2019-01-12 LAB
ALBUMIN SERPL-MCNC: 4 G/DL (ref 3.5–5.5)
ALBUMIN/GLOB SERPL: 1.8 {RATIO} (ref 1.2–2.2)
ALP SERPL-CCNC: 59 IU/L (ref 39–117)
ALT SERPL-CCNC: 13 IU/L (ref 0–32)
AMBIG ABBREV CMP14 DEFAULT: NORMAL
AMBIG ABBREV LP DEFAULT: NORMAL
AST SERPL-CCNC: 16 IU/L (ref 0–40)
BILIRUB SERPL-MCNC: 0.5 MG/DL (ref 0–1.2)
BUN SERPL-MCNC: 10 MG/DL (ref 6–20)
BUN/CREAT SERPL: 15 (ref 9–23)
CALCIUM SERPL-MCNC: 8 MG/DL (ref 8.7–10.2)
CHLORIDE SERPL-SCNC: 101 MMOL/L (ref 96–106)
CHOLEST SERPL-MCNC: 175 MG/DL (ref 100–199)
CO2 SERPL-SCNC: 25 MMOL/L (ref 20–29)
CREAT SERPL-MCNC: 0.66 MG/DL (ref 0.57–1)
GLOBULIN SER CALC-MCNC: 2.2 G/DL (ref 1.5–4.5)
GLUCOSE SERPL-MCNC: 150 MG/DL (ref 65–99)
HBA1C MFR BLD: 8.2 % (ref 4.8–5.6)
HDLC SERPL-MCNC: 27 MG/DL
LDLC SERPL CALC-MCNC: ABNORMAL MG/DL (ref 0–99)
POTASSIUM SERPL-SCNC: 4.3 MMOL/L (ref 3.5–5.2)
PROT SERPL-MCNC: 6.2 G/DL (ref 6–8.5)
SODIUM SERPL-SCNC: 136 MMOL/L (ref 134–144)
TRIGL SERPL-MCNC: 1215 MG/DL (ref 0–149)
VLDLC SERPL CALC-MCNC: ABNORMAL MG/DL (ref 5–40)

## 2019-01-14 ENCOUNTER — OFFICE VISIT (OUTPATIENT)
Dept: ENDOCRINOLOGY | Facility: CLINIC | Age: 39
End: 2019-01-14

## 2019-01-14 VITALS
OXYGEN SATURATION: 98 % | WEIGHT: 265 LBS | SYSTOLIC BLOOD PRESSURE: 122 MMHG | BODY MASS INDEX: 46.95 KG/M2 | HEIGHT: 63 IN | HEART RATE: 81 BPM | DIASTOLIC BLOOD PRESSURE: 80 MMHG

## 2019-01-14 DIAGNOSIS — E20.0 IDIOPATHIC HYPOPARATHYROIDISM (HCC): ICD-10-CM

## 2019-01-14 DIAGNOSIS — E78.2 MIXED HYPERLIPIDEMIA: ICD-10-CM

## 2019-01-14 DIAGNOSIS — E11.65 UNCONTROLLED TYPE 2 DIABETES MELLITUS WITH HYPERGLYCEMIA (HCC): Primary | ICD-10-CM

## 2019-01-14 PROCEDURE — 99214 OFFICE O/P EST MOD 30 MIN: CPT | Performed by: INTERNAL MEDICINE

## 2019-01-14 NOTE — PROGRESS NOTES
Chief Complaint   Patient presents with   • Idiopathic hypoparathyroidism     f/u      HPI:   Natty Fuentes is a 38 y.o.female who returns to Endocrine Clinic for f/u evaluation of hypoparathyroidism, Type 2 DM, and hyperlipidemia. Last clinic visit 07/11/2018.  Her history is as follows:    Interim Events:   - has been compliant with all her medications  - 2 cervical lymph nodes removed from right neck: no malignancy, pt reports nodes were purulent    1) idiopathic hypoparathyroidism:  - diagnosed in 2011 after presenting with symptomatic hypocalcemia  - etiology presumed infiltrative vs autoimmune hypoparathyroidism  - Taking calcitriol 0.25 mg BID, Caltrate + D 600 mg QID w/ food, occasionally needs extra tab for tingling  - Have asked on multiple visits, pt does not want to pursue Natpara  - last 24 hr urine calcium/Cr ratio: (07/2017) normal 0.003  - (01/11/2019) CMP: K+ 4.3, Calcium 8.0, Alb 4.0, Cr 0.66,     2) Type 2 DM with no associated complications at this time:  - diagnosed in 2013  - on metformin 1gm BID  - on glipizide 10 mg BID AC   - Hgb A1C% 8.2 on 1/11/2019  - no meter for review  (Januvia was too expensive. Pt did not tolerate Janumet, pt did not tolerate Victoza), does not want to try SGLT-2 inhibitor    DM Health Maintenance:  Ophtho: Last visit - 2017, no retinopathy per pt  Podiatry: Last visit - no recent visit  Monofilament / Foot exam: due  Lipids: see below  Urine microalbumin/Cr ratio: (07/2018) 10.4 - normal  TSH: (07/2018) normal at 1.190  Aspirin: n/a    3) Essential HTN  - managed by PCP  - pt taking lisinopril 40 mg daily, carvedilol 25 mg BID  - Takes HCTZ 25 mg daily  - past evaluation (2016) for secondary causes of HTN showed no endocrine pathology (aldosterone/renin -normal, 1 mg dex suppression test -normal)    4) mixed hyperlipidemia:   Lipid Panel (01/2019): Tchol 175, TG 1215(on fenofibrate), HDL 27, LDL not calc   - pravastatin 40 mg, fenofibrate 145 mg daily  -  "(8/17/2016) LDL 64 on pravastatin 40 mg daily    Other medical history: gout, SANYA on CPAP, GERD     Review of Systems   Constitutional: Negative.         Mild weight gain   HENT: Negative.    Eyes: Negative.    Respiratory: Negative.    Cardiovascular: Negative.    Gastrointestinal: Negative.    Endocrine: Negative.    Genitourinary: Negative.    Musculoskeletal: Positive for arthralgias.   Skin: Negative.    Allergic/Immunologic: Negative.    Neurological: Negative.    Hematological: Negative.    Psychiatric/Behavioral: Negative.      The following portions of the patient's history were reviewed and updated as appropriate: allergies, current medications, past family history, past medical history, past social history, past surgical history and problem list.    /80   Pulse 81   Ht 160 cm (63\")   Wt 120 kg (265 lb)   SpO2 98%   BMI 46.94 kg/m²   Physical Exam   Constitutional: She is oriented to person, place, and time. She appears well-developed. No distress.   obese   HENT:   Head: Normocephalic.   Mouth/Throat: Oropharynx is clear and moist.   Eyes: Conjunctivae and EOM are normal. Pupils are equal, round, and reactive to light.   Neck: No tracheal deviation present. No thyromegaly present.   No palpable thyroid nodules     Cardiovascular: Normal rate, regular rhythm and normal heart sounds.   No murmur heard.  Pulmonary/Chest: Effort normal and breath sounds normal. No respiratory distress.   Abdominal: Soft. Bowel sounds are normal. She exhibits no mass. There is no tenderness.   Musculoskeletal: She exhibits no edema.   Lymphadenopathy:     She has no cervical adenopathy.   Neurological: She is alert and oriented to person, place, and time. No cranial nerve deficit.   Skin: Skin is warm and dry. She is not diaphoretic. No erythema.   No acanthosis nigricans   Psychiatric: She has a normal mood and affect. Her behavior is normal.   Vitals reviewed.    LABS/IMAGING:  Outside labs reviewed and summarized " in HPI    ASSESSMENT/PLAN:  1) idiopathic hypoparathyroidism: controlled  - d/w pt that goal calcium is 7.0 - 8.5 to minimize calcium-phosphorus product deposition  - continue calcitriol 0.25 mg BID, Caltrate + D 600 mg QID w/ food  - pt is not interested in Natpara  - 24 hr urine calcium/creatinine due, lab slip given to pt    2) Type 2 DM w/o complication at this time: uncontrolled, recent Hgb A1C% 8.2  - pt does not want to try invokana  (Januvia was too expensive. Pt did not tolerate Janumaet, pt did not tolerate Victoza)   - pt agreeable to try Ozempic. Samples given pt to start at 0.25 mg weekly and then increase to 0.5 mg weekly. Pt to call if tolerated for Rx.   - continue metformin 1 gm BID  - continue glipizide to 10 mg BID AC    3) mixed hyperlipidemia: uncontrolled triglycerides. Uncontrolled hyperglycemia contributing  - continue pravastatin 40 mg daily given her h/o DM   - continue fenofibrate 145 mg daily consistently   - offered Rx for Rx fish oil  (Lovaza or Vascepa) pt declined due to cost. Pt does not want to take OTC fish oil  - advised to follow low fat diet    4) essential HTN: controlled  - on Lisinopril 40 mg daily, HCTZ 25 mg daily, and Coreg 25 mg BID     RTC 6 months

## 2019-01-17 DIAGNOSIS — IMO0001 UNCONTROLLED TYPE 2 DIABETES MELLITUS WITHOUT COMPLICATION, WITHOUT LONG-TERM CURRENT USE OF INSULIN: ICD-10-CM

## 2019-01-17 DIAGNOSIS — E20.0 IDIOPATHIC HYPOPARATHYROIDISM (HCC): ICD-10-CM

## 2019-01-17 RX ORDER — GLIPIZIDE 5 MG/1
10 TABLET ORAL
Qty: 120 TABLET | Refills: 11 | Status: SHIPPED | OUTPATIENT
Start: 2019-01-17 | End: 2020-01-29

## 2019-01-17 RX ORDER — CALCITRIOL 0.25 UG/1
0.25 CAPSULE, LIQUID FILLED ORAL
Qty: 60 CAPSULE | Refills: 11 | Status: SHIPPED | OUTPATIENT
Start: 2019-01-17 | End: 2020-04-22 | Stop reason: DRUGHIGH

## 2019-01-17 NOTE — TELEPHONE ENCOUNTER
PATIENT WAS IN ON 1/14/19 AND DR MEDEL TOLD HER SHE WAS GOING TO SEND HER MEDS TO HER PHARMACY, PT SAYS THEY ARE NOT THERE. CAN SHE PLEASE HAVE REFILLS ON CALCITROL AND GLIPIZIDE CALLED INTO [facility] IN [facility]. PT CAN BE REACHED -[phone]

## 2019-03-26 ENCOUNTER — TELEPHONE (OUTPATIENT)
Dept: INTERNAL MEDICINE | Facility: CLINIC | Age: 39
End: 2019-03-26

## 2019-03-26 NOTE — TELEPHONE ENCOUNTER
Pt is experiencing pain in her jaw for about 2 weeks) and her blood sugar is around 400 all the time (for the past 3 weeks.)    Pt is requesting that you call her back     [phone]

## 2019-03-27 DIAGNOSIS — E11.65 UNCONTROLLED TYPE 2 DIABETES MELLITUS WITH HYPERGLYCEMIA (HCC): Primary | ICD-10-CM

## 2019-03-27 NOTE — TELEPHONE ENCOUNTER
Spoke to patient. Started Jardiance 25 mg daily.Advised to take ibuprofen 600 mg TID for next 5 days.Will see her in clinic 3/29/2019 at 12 pm  Therese Cintron MD

## 2019-03-29 ENCOUNTER — OFFICE VISIT (OUTPATIENT)
Dept: ENDOCRINOLOGY | Facility: CLINIC | Age: 39
End: 2019-03-29

## 2019-03-29 VITALS
HEIGHT: 63 IN | BODY MASS INDEX: 46.95 KG/M2 | DIASTOLIC BLOOD PRESSURE: 78 MMHG | HEART RATE: 74 BPM | OXYGEN SATURATION: 98 % | WEIGHT: 265 LBS | SYSTOLIC BLOOD PRESSURE: 126 MMHG

## 2019-03-29 DIAGNOSIS — R59.0 SUPRACLAVICULAR ADENOPATHY: Primary | ICD-10-CM

## 2019-03-29 DIAGNOSIS — E78.2 MIXED HYPERLIPIDEMIA: ICD-10-CM

## 2019-03-29 DIAGNOSIS — I10 ESSENTIAL HYPERTENSION: ICD-10-CM

## 2019-03-29 DIAGNOSIS — E11.65 UNCONTROLLED TYPE 2 DIABETES MELLITUS WITH HYPERGLYCEMIA (HCC): ICD-10-CM

## 2019-03-29 DIAGNOSIS — E06.1 THYROIDITIS, SUBACUTE: ICD-10-CM

## 2019-03-29 DIAGNOSIS — E20.0 IDIOPATHIC HYPOPARATHYROIDISM (HCC): ICD-10-CM

## 2019-03-29 DIAGNOSIS — E04.9 NONTOXIC NODULAR GOITER: ICD-10-CM

## 2019-03-29 PROCEDURE — 99215 OFFICE O/P EST HI 40 MIN: CPT | Performed by: INTERNAL MEDICINE

## 2019-03-29 PROCEDURE — 76536 US EXAM OF HEAD AND NECK: CPT | Performed by: INTERNAL MEDICINE

## 2019-03-29 RX ORDER — METHYLPREDNISOLONE 4 MG/1
TABLET ORAL
Qty: 21 TABLET | Refills: 1 | Status: SHIPPED | OUTPATIENT
Start: 2019-03-29 | End: 2020-04-22

## 2019-04-03 LAB
T4 FREE SERPL-MCNC: 1.21 NG/DL (ref 0.82–1.77)
TSH SERPL DL<=0.005 MIU/L-ACNC: 1.26 UIU/ML (ref 0.45–4.5)

## 2019-08-01 DIAGNOSIS — E78.2 MIXED HYPERLIPIDEMIA: ICD-10-CM

## 2019-08-01 RX ORDER — FENOFIBRATE 145 MG/1
TABLET, COATED ORAL
Qty: 30 TABLET | Refills: 0 | Status: SHIPPED | OUTPATIENT
Start: 2019-08-01 | End: 2019-08-29 | Stop reason: SDUPTHER

## 2019-08-29 DIAGNOSIS — E78.2 MIXED HYPERLIPIDEMIA: ICD-10-CM

## 2019-08-29 RX ORDER — FENOFIBRATE 145 MG/1
145 TABLET, COATED ORAL DAILY
Qty: 90 TABLET | Refills: 3 | Status: SHIPPED | OUTPATIENT
Start: 2019-08-29 | End: 2020-09-01

## 2019-09-03 ENCOUNTER — OFFICE VISIT (OUTPATIENT)
Dept: ENDOCRINOLOGY | Facility: CLINIC | Age: 39
End: 2019-09-03

## 2019-09-03 VITALS
WEIGHT: 257.3 LBS | BODY MASS INDEX: 45.58 KG/M2 | HEART RATE: 78 BPM | OXYGEN SATURATION: 99 % | DIASTOLIC BLOOD PRESSURE: 64 MMHG | SYSTOLIC BLOOD PRESSURE: 108 MMHG

## 2019-09-03 DIAGNOSIS — E78.2 MIXED HYPERLIPIDEMIA: ICD-10-CM

## 2019-09-03 DIAGNOSIS — I10 ESSENTIAL HYPERTENSION: ICD-10-CM

## 2019-09-03 DIAGNOSIS — E20.0 IDIOPATHIC HYPOPARATHYROIDISM (HCC): Primary | ICD-10-CM

## 2019-09-03 DIAGNOSIS — E04.9 NODULAR GOITER: ICD-10-CM

## 2019-09-03 DIAGNOSIS — E11.65 TYPE 2 DIABETES MELLITUS WITH HYPERGLYCEMIA, WITHOUT LONG-TERM CURRENT USE OF INSULIN (HCC): ICD-10-CM

## 2019-09-03 LAB
GLUCOSE BLDC GLUCOMTR-MCNC: 159 MG/DL (ref 70–130)
HBA1C MFR BLD: 7.8 %

## 2019-09-03 PROCEDURE — 83036 HEMOGLOBIN GLYCOSYLATED A1C: CPT | Performed by: INTERNAL MEDICINE

## 2019-09-03 PROCEDURE — 82947 ASSAY GLUCOSE BLOOD QUANT: CPT | Performed by: INTERNAL MEDICINE

## 2019-09-03 PROCEDURE — 99214 OFFICE O/P EST MOD 30 MIN: CPT | Performed by: INTERNAL MEDICINE

## 2019-09-03 RX ORDER — METOPROLOL SUCCINATE 25 MG/1
12.5 TABLET, EXTENDED RELEASE ORAL DAILY
Qty: 45 TABLET | Refills: 3 | Status: SHIPPED | OUTPATIENT
Start: 2019-09-03 | End: 2019-10-23 | Stop reason: SDUPTHER

## 2019-09-03 NOTE — PROGRESS NOTES
Chief Complaint   Patient presents with   • Follow-up     DM 2   • Follow-up     Idiopathic Hypoparathyroidism     HPI:   Natty Fuentes is a 38 y.o.female who returns to Endocrine Clinic for f/u evaluation of multiple medical problems. Last clinic visit 03/29/2019.  Her history is as follows:    Interim Events:   - saw Dr. Valentino, her left neck swelling and pain have resolved. Thought to have neck/shoulder injury. No evidence of LAD  - thyroiditis pain at last visit has resolved   - tolerating Jardiance: no infection    1) idiopathic hypoparathyroidism:  - diagnosed in 2011 after presenting with symptomatic hypocalcemia  - etiology presumed infiltrative vs autoimmune hypoparathyroidism  - Taking calcitriol 0.25 mg BID, Caltrate + D 600 mg QID w/ food, occasionally needs extra tab for tingling  - On HCTZ 25 mg daily for HTN: no calciuria on this Rx  - Have asked on multiple visits, pt does not want to pursue Natpara. Natpara currently unavailable due to national recall   - last 24 hr urine calcium/Cr ratio: (07/2017) normal 0.003  - (03/13/2019) CMP: K+ 4.7, Calcium 7.9, Alb 4.0, Cr 0.76     2) Type 2 DM with no associated complications at this time:  - diagnosed in 2013  - did not tolerate Ozempic, Victoza samples  - did not tolerate Januvia     Current Medications:  - metformin 1gm BID  - glipizide 10 mg BID AC   - Jardiance 25 mg daily    - no meter for review    DM Health Maintenance:  Ophtho: Last visit - 2017, no retinopathy per pt  Podiatry: Last visit - no recent visit  Monofilament / Foot exam: due  Lipids: see below  Urine microalbumin/Cr ratio: (07/2018) 10.4 - normal  TSH: (04/2019) normal at 1.260  Aspirin: 81 mg daily - per her PCP    3) Essential HTN:  - pt taking lisinopril 40 mg daily, carvediolol mg bid , HCTZ 25 mg daily  - past evaluation (2016) for secondary causes of HTN showed no endocrine pathology (aldosterone/renin -normal, 1 mg dex suppression test -normal)  - States that carvediolol  "causing pulse to be too low, lightheadedness.     4) mixed hyperlipidemia:   - not consistently following a low fat diet  Lipid Panel (01/2019): Tchol 175, TG 1215(on fenofibrate), HDL 27, LDL not calc   - taking pravastatin 40 mg, fenofibrate 145 mg daily  - (8/17/2016) LDL 64 on pravastatin 40 mg daily    5) nontoxic nodular goiter:   (10/04/2011): Pt had a thyroid US and US-Guided FNA of a right lobe/isthmus nodule by  Endocrinology. The US report described a \" nodule measuring 1.74 x 1.06 x 1.31 cm in size is complex, hypoechoic, with indistinct borders. A few small calcifications without coma tail. It has a complex cystic-solid consistency and a grade II vascularity.\"    (10/04/2011): Cytology Report: \"DIAGNOSIS ULTRASOUND GUIDED FNA, RIGHT THYROID NODULE:  ABUNDANT COLLOID, RARE  MACROPHAGES, AND SCANT FOLLICULAR GROUPS\"    - Repeat thyroid US was completed in clinic 03/2019:  The thyroid gland was mildly enlarged in size by measured dimensions. Finding are consistent with nodular goiter. No discrete nodules were identified in the left lobe. At the junction of the right lobe and isthmus, a 0.99(L) x 0.68(AP) x 0.80(T) cm hypoechoic mixed cystic and solid nodule was identified. The nodule's margin was not well defined. The nodule had minimal peripheral vascularity and no microcalcifications. Course linear calcifications were present. Although imaging was not available for comparison, the nodule appeared to be significantly smaller compared to the outside US report description from 10/2011 and had similar imaging characteristics. Two slightly enlarged left, level III cervical lymph nodes were identified with US characteristics suggestive of benign lymph nodes as described above. No pathologic lymph nodes were seen. Specifically, no superficial mass or enlarged lymph nodes were noted in the left supraclavicular area where patient describes pain and swelling.   RECOMMENDATIONS: Repeat Thyroid US recommended if " changes in the gland/neck are noted on physical exam    Other medical history: gout, SANYA on CPAP, GERD     Review of Systems   Constitutional: Negative.         Mild weight loss   HENT: Negative.  Negative for sore throat and trouble swallowing.    Eyes: Negative.    Respiratory: Negative.    Cardiovascular: Negative.    Gastrointestinal: Negative.    Endocrine: Negative.    Genitourinary: Negative.    Musculoskeletal: Positive for arthralgias.   Skin: Negative.    Allergic/Immunologic: Negative.    Neurological: Negative.    Hematological: Negative.    Psychiatric/Behavioral: Negative.      The following portions of the patient's history were reviewed and updated as appropriate: allergies, current medications, past family history, past medical history, past social history, past surgical history and problem list.    /64   Pulse 78   Wt 117 kg (257 lb 4.8 oz)   SpO2 99%   Breastfeeding? No   BMI 45.58 kg/m²   Physical Exam   Constitutional: She is oriented to person, place, and time. She appears well-developed. No distress.   obese   HENT:   Head: Normocephalic.   Mouth/Throat: Oropharynx is clear and moist.   Eyes: Conjunctivae and EOM are normal. Pupils are equal, round, and reactive to light.   Neck: No tracheal deviation present. Thyromegaly (mild) present.   No palpable thyroid nodules. No tenderness to palpation     Cardiovascular: Normal rate, regular rhythm and normal heart sounds.   No murmur heard.  Pulmonary/Chest: Effort normal and breath sounds normal. No respiratory distress.   Abdominal: Soft. Bowel sounds are normal. She exhibits no mass. There is no tenderness.   Musculoskeletal: She exhibits no edema.   Lymphadenopathy:     She has no cervical adenopathy.   Neurological: She is alert and oriented to person, place, and time. No cranial nerve deficit.   Skin: Skin is warm and dry. She is not diaphoretic. No erythema.   No acanthosis nigricans   Psychiatric: She has a normal mood and affect.  Her behavior is normal.   Vitals reviewed.    LABS/IMAGING:  Outside labs reviewed and summarized in HPI  Lab Results   Component Value Date    TSH 1.260 04/02/2019     Office Visit on 09/03/2019   Component Date Value Ref Range Status   • Glucose 09/03/2019 159* 70 - 130 mg/dL Final   • Hemoglobin A1C 09/03/2019 7.8  % Final       ASSESSMENT/PLAN:  1) idiopathic hypoparathyroidism: controlled  - d/w pt that goal calcium is 7.0 - 8.5 to minimize calcium-phosphorus product deposition  - continue calcitriol 0.25 mg BID, Caltrate + D 600 mg QID w/ food  - pt is not interested in Natpara  - 24 hr urine calcium/creatinine due.     2) Type 2 DM w/o complication at this time: fair control, Hgb A1C% 7.8 today  (Pt did not tolerate Janumaet, pt did not tolerate Victoza, Ozempic)   - continue Jardiance 25 mg daily  - continue metformin 1 gm BID  - continue glipizide to 10 mg BID AC  - encouraged pt to follow a carb consistent meal plan    3) mixed hyperlipidemia: uncontrolled  - continue pravastatin 40 mg daily given her h/o DM   - continue fenofibrate 145 mg daily consistently   - offered Rx for Rx fish oil  (Lovaza or Vascepa) pt declined due to cost. Pt does not want to take OTC fish oil  - advised to follow low fat diet    4) essential HTN: controlled, however, pt reporting light-headedness with the carvedilol  - will d/c carvedilol. Start metoprolol ER 12.5 mg daily  - continue Lisinopril 40 mg daily, HCTZ 25 mg daily    5) nontoxic nodular goiter: stable on exam  - Thyroid US from 03/2019 reviewed  - Repeat US based on changes in physical exam    Lab slips for TSH, free T4, urine micro/alb ratio, lipid panel, CMP given to patient. To be completed before return visit.    RTC 6 months

## 2019-09-29 PROBLEM — E04.9 NODULAR GOITER: Status: ACTIVE | Noted: 2019-09-29

## 2019-10-23 DIAGNOSIS — E11.65 TYPE 2 DIABETES MELLITUS WITH HYPERGLYCEMIA, WITHOUT LONG-TERM CURRENT USE OF INSULIN (HCC): ICD-10-CM

## 2019-10-23 RX ORDER — METOPROLOL SUCCINATE 25 MG/1
25 TABLET, EXTENDED RELEASE ORAL DAILY
Qty: 90 TABLET | Refills: 3 | Status: SHIPPED | OUTPATIENT
Start: 2019-10-23 | End: 2020-10-05

## 2020-01-28 DIAGNOSIS — IMO0001 UNCONTROLLED TYPE 2 DIABETES MELLITUS WITHOUT COMPLICATION, WITHOUT LONG-TERM CURRENT USE OF INSULIN: ICD-10-CM

## 2020-01-29 RX ORDER — GLIPIZIDE 5 MG/1
TABLET ORAL
Qty: 120 TABLET | Refills: 3 | Status: SHIPPED | OUTPATIENT
Start: 2020-01-29 | End: 2020-07-02

## 2020-04-22 ENCOUNTER — OFFICE VISIT (OUTPATIENT)
Dept: ENDOCRINOLOGY | Facility: CLINIC | Age: 40
End: 2020-04-22

## 2020-04-22 VITALS — BODY MASS INDEX: 41.64 KG/M2 | WEIGHT: 235 LBS | HEIGHT: 63 IN

## 2020-04-22 DIAGNOSIS — IMO0001 UNCONTROLLED TYPE 2 DIABETES MELLITUS WITHOUT COMPLICATION, WITHOUT LONG-TERM CURRENT USE OF INSULIN: ICD-10-CM

## 2020-04-22 DIAGNOSIS — E11.65 UNCONTROLLED TYPE 2 DIABETES MELLITUS WITH HYPERGLYCEMIA (HCC): Primary | ICD-10-CM

## 2020-04-22 DIAGNOSIS — E20.0 IDIOPATHIC HYPOPARATHYROIDISM (HCC): ICD-10-CM

## 2020-04-22 DIAGNOSIS — I10 ESSENTIAL HYPERTENSION: ICD-10-CM

## 2020-04-22 DIAGNOSIS — E78.2 MIXED HYPERLIPIDEMIA: ICD-10-CM

## 2020-04-22 PROCEDURE — 99442 PR PHYS/QHP TELEPHONE EVALUATION 11-20 MIN: CPT | Performed by: INTERNAL MEDICINE

## 2020-04-22 RX ORDER — INSULIN DETEMIR 100 [IU]/ML
20 INJECTION, SOLUTION SUBCUTANEOUS DAILY
Qty: 5 PEN | Refills: 5 | Status: SHIPPED | OUTPATIENT
Start: 2020-04-22 | End: 2020-10-11

## 2020-04-22 RX ORDER — PEN NEEDLE, DIABETIC 30 GX3/16"
1 NEEDLE, DISPOSABLE MISCELLANEOUS DAILY
Qty: 100 EACH | Refills: 3 | Status: SHIPPED | OUTPATIENT
Start: 2020-04-22 | End: 2020-09-28 | Stop reason: SDUPTHER

## 2020-04-22 RX ORDER — PRAVASTATIN SODIUM 40 MG
40 TABLET ORAL DAILY
Qty: 90 TABLET | Refills: 3 | Status: SHIPPED | OUTPATIENT
Start: 2020-04-22

## 2020-04-22 RX ORDER — CALCITRIOL 0.5 UG/1
0.5 CAPSULE, LIQUID FILLED ORAL DAILY
Qty: 90 CAPSULE | Refills: 3 | Status: SHIPPED | OUTPATIENT
Start: 2020-04-22 | End: 2021-05-11

## 2020-07-02 RX ORDER — GLIPIZIDE 5 MG/1
TABLET ORAL
Qty: 120 TABLET | Refills: 3 | Status: SHIPPED | OUTPATIENT
Start: 2020-07-02 | End: 2020-10-11

## 2020-09-01 DIAGNOSIS — E78.2 MIXED HYPERLIPIDEMIA: ICD-10-CM

## 2020-09-01 RX ORDER — FENOFIBRATE 145 MG/1
TABLET, COATED ORAL
Qty: 30 TABLET | Refills: 3 | Status: SHIPPED | OUTPATIENT
Start: 2020-09-01 | End: 2021-02-02

## 2020-09-28 ENCOUNTER — OFFICE VISIT (OUTPATIENT)
Dept: ENDOCRINOLOGY | Facility: CLINIC | Age: 40
End: 2020-09-28

## 2020-09-28 VITALS
HEART RATE: 63 BPM | HEIGHT: 63 IN | WEIGHT: 252 LBS | SYSTOLIC BLOOD PRESSURE: 138 MMHG | OXYGEN SATURATION: 98 % | DIASTOLIC BLOOD PRESSURE: 66 MMHG | BODY MASS INDEX: 44.65 KG/M2

## 2020-09-28 DIAGNOSIS — E20.0 IDIOPATHIC HYPOPARATHYROIDISM (HCC): ICD-10-CM

## 2020-09-28 DIAGNOSIS — E04.9 NODULAR GOITER: ICD-10-CM

## 2020-09-28 DIAGNOSIS — I10 ESSENTIAL HYPERTENSION: ICD-10-CM

## 2020-09-28 DIAGNOSIS — E11.65 UNCONTROLLED TYPE 2 DIABETES MELLITUS WITH HYPERGLYCEMIA (HCC): Primary | ICD-10-CM

## 2020-09-28 DIAGNOSIS — E78.2 MIXED HYPERLIPIDEMIA: ICD-10-CM

## 2020-09-28 LAB
EXPIRATION DATE: ABNORMAL
GLUCOSE BLDC GLUCOMTR-MCNC: 247 MG/DL (ref 70–130)
Lab: ABNORMAL

## 2020-09-28 PROCEDURE — 82947 ASSAY GLUCOSE BLOOD QUANT: CPT | Performed by: INTERNAL MEDICINE

## 2020-09-28 PROCEDURE — 99214 OFFICE O/P EST MOD 30 MIN: CPT | Performed by: INTERNAL MEDICINE

## 2020-09-28 RX ORDER — PEN NEEDLE, DIABETIC 30 GX3/16"
1 NEEDLE, DISPOSABLE MISCELLANEOUS
Qty: 150 EACH | Refills: 11 | Status: SHIPPED | OUTPATIENT
Start: 2020-09-28 | End: 2020-12-29

## 2020-10-05 DIAGNOSIS — E11.65 TYPE 2 DIABETES MELLITUS WITH HYPERGLYCEMIA, WITHOUT LONG-TERM CURRENT USE OF INSULIN (HCC): ICD-10-CM

## 2020-10-05 RX ORDER — METOPROLOL SUCCINATE 25 MG/1
TABLET, EXTENDED RELEASE ORAL
Qty: 90 TABLET | Refills: 3 | Status: SHIPPED | OUTPATIENT
Start: 2020-10-05 | End: 2021-04-07

## 2020-10-11 RX ORDER — INSULIN LISPRO 100 [IU]/ML
INJECTION, SOLUTION INTRAVENOUS; SUBCUTANEOUS
Qty: 5 PEN | Refills: 5 | Status: SHIPPED | OUTPATIENT
Start: 2020-10-11 | End: 2020-12-17 | Stop reason: DRUGHIGH

## 2020-10-11 RX ORDER — INSULIN DEGLUDEC INJECTION 100 U/ML
36 INJECTION, SOLUTION SUBCUTANEOUS NIGHTLY
Qty: 5 PEN | Refills: 5 | Status: SHIPPED | OUTPATIENT
Start: 2020-10-11 | End: 2020-10-20 | Stop reason: CLARIF

## 2020-10-20 ENCOUNTER — TELEPHONE (OUTPATIENT)
Dept: ENDOCRINOLOGY | Facility: CLINIC | Age: 40
End: 2020-10-20

## 2020-10-20 DIAGNOSIS — E11.65 UNCONTROLLED TYPE 2 DIABETES MELLITUS WITH HYPERGLYCEMIA (HCC): Primary | ICD-10-CM

## 2020-10-20 RX ORDER — INSULIN DETEMIR 100 [IU]/ML
50 INJECTION, SOLUTION SUBCUTANEOUS DAILY
Qty: 5 PEN | Refills: 11 | Status: SHIPPED | OUTPATIENT
Start: 2020-10-20 | End: 2020-11-13 | Stop reason: SDUPTHER

## 2020-10-20 NOTE — TELEPHONE ENCOUNTER
Spoke with Pharmacy and they stated Trulicity requires a PA. I can start it and see if I can get it covered?

## 2020-10-20 NOTE — TELEPHONE ENCOUNTER
Cristy called from [facility] pharmacy in regards to medication  change.. Since Tresiba FlexTouch 100 UNIT/ML solution pen-injector injection  wasn't covered on insurance, she was advised  to change it to Levemir. However, due to the switch, Cristy said the  dosage is supposed to be reduced.  She is requesting verification on if the dosage is needing to be changed or kept the same dose.

## 2020-10-20 NOTE — TELEPHONE ENCOUNTER
Auth for Tresiba is not covered with Medicaid. Pharmacy stated they would try others and see which one was covered. They will get back with us for the script.

## 2020-10-22 ENCOUNTER — LAB (OUTPATIENT)
Dept: LAB | Facility: HOSPITAL | Age: 40
End: 2020-10-22

## 2020-10-22 ENCOUNTER — LAB (OUTPATIENT)
Dept: ENDOCRINOLOGY | Facility: CLINIC | Age: 40
End: 2020-10-22

## 2020-10-22 DIAGNOSIS — E78.2 MIXED HYPERLIPIDEMIA: ICD-10-CM

## 2020-10-22 DIAGNOSIS — E11.65 UNCONTROLLED TYPE 2 DIABETES MELLITUS WITH HYPERGLYCEMIA (HCC): ICD-10-CM

## 2020-10-22 LAB
ALBUMIN SERPL-MCNC: 4.1 G/DL (ref 3.5–5.2)
ALBUMIN/GLOB SERPL: 1.7 G/DL
ALP SERPL-CCNC: 88 U/L (ref 39–117)
ALT SERPL W P-5'-P-CCNC: 13 U/L (ref 1–33)
ANION GAP SERPL CALCULATED.3IONS-SCNC: 15.2 MMOL/L (ref 5–15)
AST SERPL-CCNC: 13 U/L (ref 1–32)
BILIRUB SERPL-MCNC: 0.5 MG/DL (ref 0–1.2)
BUN SERPL-MCNC: 11 MG/DL (ref 6–20)
BUN/CREAT SERPL: 15.1 (ref 7–25)
CALCIUM SPEC-SCNC: 7.9 MG/DL (ref 8.6–10.5)
CHLORIDE SERPL-SCNC: 98 MMOL/L (ref 98–107)
CHOLEST SERPL-MCNC: 213 MG/DL (ref 0–200)
CO2 SERPL-SCNC: 24.8 MMOL/L (ref 22–29)
CREAT SERPL-MCNC: 0.73 MG/DL (ref 0.57–1)
DEPRECATED RDW RBC AUTO: 44.4 FL (ref 37–54)
ERYTHROCYTE [DISTWIDTH] IN BLOOD BY AUTOMATED COUNT: 15.7 % (ref 12.3–15.4)
GFR SERPL CREATININE-BSD FRML MDRD: 88 ML/MIN/1.73
GLOBULIN UR ELPH-MCNC: 2.4 GM/DL
GLUCOSE SERPL-MCNC: 267 MG/DL (ref 65–99)
HBA1C MFR BLD: 8.5 % (ref 4.8–5.6)
HCT VFR BLD AUTO: 37.8 % (ref 34–46.6)
HDLC SERPL-MCNC: ABNORMAL MG/DL
HGB BLD-MCNC: 12.6 G/DL (ref 12–15.9)
LDLC SERPL CALC-MCNC: ABNORMAL MG/DL
LDLC/HDLC SERPL: ABNORMAL {RATIO}
MCH RBC QN AUTO: 26.5 PG (ref 26.6–33)
MCHC RBC AUTO-ENTMCNC: 33.3 G/DL (ref 31.5–35.7)
MCV RBC AUTO: 79.4 FL (ref 79–97)
PLATELET # BLD AUTO: 278 10*3/MM3 (ref 140–450)
PMV BLD AUTO: 10.8 FL (ref 6–12)
POTASSIUM SERPL-SCNC: 4.1 MMOL/L (ref 3.5–5.2)
PROT SERPL-MCNC: 6.5 G/DL (ref 6–8.5)
RBC # BLD AUTO: 4.76 10*6/MM3 (ref 3.77–5.28)
SODIUM SERPL-SCNC: 138 MMOL/L (ref 136–145)
TRIGL SERPL-MCNC: 1555 MG/DL (ref 0–150)
VLDLC SERPL-MCNC: ABNORMAL MG/DL
WBC # BLD AUTO: 6.28 10*3/MM3 (ref 3.4–10.8)

## 2020-10-22 PROCEDURE — 80053 COMPREHEN METABOLIC PANEL: CPT | Performed by: INTERNAL MEDICINE

## 2020-10-22 PROCEDURE — 86341 ISLET CELL ANTIBODY: CPT | Performed by: INTERNAL MEDICINE

## 2020-10-22 PROCEDURE — 84681 ASSAY OF C-PEPTIDE: CPT | Performed by: INTERNAL MEDICINE

## 2020-10-22 PROCEDURE — 83036 HEMOGLOBIN GLYCOSYLATED A1C: CPT | Performed by: INTERNAL MEDICINE

## 2020-10-22 PROCEDURE — 82570 ASSAY OF URINE CREATININE: CPT | Performed by: INTERNAL MEDICINE

## 2020-10-22 PROCEDURE — 82043 UR ALBUMIN QUANTITATIVE: CPT | Performed by: INTERNAL MEDICINE

## 2020-10-22 PROCEDURE — 85027 COMPLETE CBC AUTOMATED: CPT | Performed by: INTERNAL MEDICINE

## 2020-10-22 PROCEDURE — 83721 ASSAY OF BLOOD LIPOPROTEIN: CPT

## 2020-10-22 PROCEDURE — 80061 LIPID PANEL: CPT | Performed by: INTERNAL MEDICINE

## 2020-10-23 LAB
ALBUMIN UR-MCNC: <1.2 MG/DL
ARTICHOKE IGE QN: 58 MG/DL (ref 0–100)
CREAT UR-MCNC: 80.3 MG/DL
MICROALBUMIN/CREAT UR: NORMAL MG/G{CREAT}

## 2020-10-26 LAB — C PEPTIDE SERPL-MCNC: 8.4 NG/ML (ref 1.1–4.4)

## 2020-10-29 ENCOUNTER — TELEPHONE (OUTPATIENT)
Dept: ENDOCRINOLOGY | Facility: CLINIC | Age: 40
End: 2020-10-29

## 2020-10-30 DIAGNOSIS — E11.65 UNCONTROLLED TYPE 2 DIABETES MELLITUS WITH HYPERGLYCEMIA (HCC): Primary | ICD-10-CM

## 2020-10-30 RX ORDER — PIOGLITAZONEHYDROCHLORIDE 30 MG/1
30 TABLET ORAL DAILY
Qty: 30 TABLET | Refills: 5 | Status: SHIPPED | OUTPATIENT
Start: 2020-10-30 | End: 2021-02-09 | Stop reason: SINTOL

## 2020-10-31 LAB — ISLET CELL512 AB SER-ACNC: <7.5 U/ML

## 2020-11-13 DIAGNOSIS — E11.65 UNCONTROLLED TYPE 2 DIABETES MELLITUS WITH HYPERGLYCEMIA (HCC): ICD-10-CM

## 2020-11-13 RX ORDER — CALCIUM CITRATE/VITAMIN D3 200MG-6.25
1 TABLET ORAL
Qty: 150 EACH | Refills: 5 | Status: SHIPPED | OUTPATIENT
Start: 2020-11-13

## 2020-11-13 RX ORDER — BLOOD-GLUCOSE METER
1 EACH MISCELLANEOUS TAKE AS DIRECTED
Qty: 1 DEVICE | Refills: 0 | Status: SHIPPED | OUTPATIENT
Start: 2020-11-13

## 2020-11-13 RX ORDER — INSULIN DETEMIR 100 [IU]/ML
INJECTION, SOLUTION SUBCUTANEOUS
Qty: 30 ML | Refills: 11 | Status: SHIPPED | OUTPATIENT
Start: 2020-11-13 | End: 2021-01-13

## 2020-11-20 ENCOUNTER — TELEPHONE (OUTPATIENT)
Dept: ENDOCRINOLOGY | Facility: CLINIC | Age: 40
End: 2020-11-20

## 2020-12-17 RX ORDER — INSULIN LISPRO 100 [IU]/ML
26 INJECTION, SOLUTION INTRAVENOUS; SUBCUTANEOUS
Qty: 75 ML | Refills: 3 | Status: SHIPPED | OUTPATIENT
Start: 2020-12-17 | End: 2021-11-12

## 2020-12-17 NOTE — TELEPHONE ENCOUNTER
Pharmacy called stating that on her medication she is taking 26 units instead of the 12 units and she needs a refill for 26 units so her insurance can cover it, please advise

## 2020-12-29 DIAGNOSIS — E11.65 UNCONTROLLED TYPE 2 DIABETES MELLITUS WITH HYPERGLYCEMIA (HCC): ICD-10-CM

## 2020-12-29 RX ORDER — PEN NEEDLE, DIABETIC 31 GX5/16"
1 NEEDLE, DISPOSABLE MISCELLANEOUS
Qty: 200 EACH | Refills: 5 | Status: SHIPPED | OUTPATIENT
Start: 2020-12-29 | End: 2022-01-04

## 2021-01-13 ENCOUNTER — TELEPHONE (OUTPATIENT)
Dept: ENDOCRINOLOGY | Facility: CLINIC | Age: 41
End: 2021-01-13

## 2021-01-13 DIAGNOSIS — E11.65 UNCONTROLLED TYPE 2 DIABETES MELLITUS WITH HYPERGLYCEMIA (HCC): Primary | ICD-10-CM

## 2021-01-13 RX ORDER — PROCHLORPERAZINE 25 MG/1
SUPPOSITORY RECTAL
Qty: 3 EACH | Refills: 11 | Status: SHIPPED | OUTPATIENT
Start: 2021-01-13 | End: 2022-01-25

## 2021-01-13 RX ORDER — PROCHLORPERAZINE 25 MG/1
1 SUPPOSITORY RECTAL
Qty: 1 EACH | Refills: 4 | Status: SHIPPED | OUTPATIENT
Start: 2021-01-13 | End: 2022-04-18

## 2021-01-13 RX ORDER — HUMAN INSULIN 100 [IU]/ML
INJECTION, SUSPENSION SUBCUTANEOUS
Qty: 30 ML | Refills: 11 | Status: SHIPPED | OUTPATIENT
Start: 2021-01-13 | End: 2021-04-14

## 2021-01-13 NOTE — TELEPHONE ENCOUNTER
[facility] CALLED STATING THAT THEY RECEIVED PRESCRIPTIONS FOR TRANSMITTER AND SENSORS. PHARM STATED THAT THEY NEED A PRESCRIPTION FOR THE  OR PROOF THE PT HAS ONE. PLEASE SEND IN AT EARLIEST CONVENIENCE.

## 2021-02-02 DIAGNOSIS — E11.65 UNCONTROLLED TYPE 2 DIABETES MELLITUS WITH HYPERGLYCEMIA (HCC): Primary | ICD-10-CM

## 2021-02-02 DIAGNOSIS — E78.2 MIXED HYPERLIPIDEMIA: ICD-10-CM

## 2021-02-02 RX ORDER — ORAL SEMAGLUTIDE 7 MG/1
1 TABLET ORAL EVERY MORNING
Qty: 30 TABLET | Refills: 11 | Status: SHIPPED | OUTPATIENT
Start: 2021-02-02 | End: 2021-07-14 | Stop reason: DRUGHIGH

## 2021-02-02 RX ORDER — FENOFIBRATE 145 MG/1
TABLET, COATED ORAL
Qty: 90 TABLET | Refills: 3 | Status: SHIPPED | OUTPATIENT
Start: 2021-02-02

## 2021-02-09 DIAGNOSIS — E11.65 UNCONTROLLED TYPE 2 DIABETES MELLITUS WITH HYPERGLYCEMIA (HCC): Primary | ICD-10-CM

## 2021-02-23 ENCOUNTER — TELEPHONE (OUTPATIENT)
Dept: ENDOCRINOLOGY | Facility: CLINIC | Age: 41
End: 2021-02-23

## 2021-02-23 NOTE — TELEPHONE ENCOUNTER
PT RETURNED OUR CALL IN RGARDS TO PREVIOUS NOTE. PT STATED THAT SHE DOES NOT USE A  BUT INSTEAD USES HER PHONE. PLEASE REACH OUT TO PT AT EARLIEST CONVENIENCE. THANK YOU.

## 2021-03-29 ENCOUNTER — LAB (OUTPATIENT)
Dept: LAB | Facility: HOSPITAL | Age: 41
End: 2021-03-29

## 2021-03-29 ENCOUNTER — OFFICE VISIT (OUTPATIENT)
Dept: ENDOCRINOLOGY | Facility: CLINIC | Age: 41
End: 2021-03-29

## 2021-03-29 VITALS
SYSTOLIC BLOOD PRESSURE: 140 MMHG | HEART RATE: 77 BPM | OXYGEN SATURATION: 98 % | HEIGHT: 62 IN | WEIGHT: 266 LBS | BODY MASS INDEX: 48.95 KG/M2 | DIASTOLIC BLOOD PRESSURE: 78 MMHG

## 2021-03-29 DIAGNOSIS — E20.0 IDIOPATHIC HYPOPARATHYROIDISM (HCC): ICD-10-CM

## 2021-03-29 DIAGNOSIS — E04.9 NODULAR GOITER: ICD-10-CM

## 2021-03-29 DIAGNOSIS — E78.2 MIXED HYPERLIPIDEMIA: ICD-10-CM

## 2021-03-29 DIAGNOSIS — Z79.4 TYPE 2 DIABETES MELLITUS WITHOUT COMPLICATION, WITH LONG-TERM CURRENT USE OF INSULIN (HCC): Primary | ICD-10-CM

## 2021-03-29 DIAGNOSIS — I10 ESSENTIAL HYPERTENSION: ICD-10-CM

## 2021-03-29 DIAGNOSIS — E11.9 TYPE 2 DIABETES MELLITUS WITHOUT COMPLICATION, WITH LONG-TERM CURRENT USE OF INSULIN (HCC): Primary | ICD-10-CM

## 2021-03-29 LAB
25(OH)D3 SERPL-MCNC: 19 NG/ML
ALBUMIN SERPL-MCNC: 4.3 G/DL (ref 3.5–5.2)
ALBUMIN UR-MCNC: <1.2 MG/DL
ALBUMIN/GLOB SERPL: 1.6 G/DL
ALP SERPL-CCNC: 63 U/L (ref 39–117)
ALT SERPL W P-5'-P-CCNC: 11 U/L (ref 1–33)
ANION GAP SERPL CALCULATED.3IONS-SCNC: 17.8 MMOL/L (ref 5–15)
ARTICHOKE IGE QN: 86 MG/DL (ref 0–100)
AST SERPL-CCNC: 15 U/L (ref 1–32)
BILIRUB SERPL-MCNC: 0.3 MG/DL (ref 0–1.2)
BUN SERPL-MCNC: 24 MG/DL (ref 6–20)
BUN/CREAT SERPL: 22.4 (ref 7–25)
CALCIUM SPEC-SCNC: 8.3 MG/DL (ref 8.6–10.5)
CHLORIDE SERPL-SCNC: 99 MMOL/L (ref 98–107)
CHOLEST SERPL-MCNC: 213 MG/DL (ref 0–200)
CO2 SERPL-SCNC: 22.2 MMOL/L (ref 22–29)
CREAT SERPL-MCNC: 1.07 MG/DL (ref 0.57–1)
CREAT UR-MCNC: 45.9 MG/DL
DEPRECATED RDW RBC AUTO: 49.3 FL (ref 37–54)
ERYTHROCYTE [DISTWIDTH] IN BLOOD BY AUTOMATED COUNT: 17.2 % (ref 12.3–15.4)
EXPIRATION DATE: ABNORMAL
GFR SERPL CREATININE-BSD FRML MDRD: 57 ML/MIN/1.73
GLOBULIN UR ELPH-MCNC: 2.7 GM/DL
GLUCOSE BLDC GLUCOMTR-MCNC: 170 MG/DL (ref 70–130)
GLUCOSE SERPL-MCNC: 170 MG/DL (ref 65–99)
HBA1C MFR BLD: 6.8 %
HCT VFR BLD AUTO: 38.5 % (ref 34–46.6)
HDLC SERPL-MCNC: 29 MG/DL (ref 40–60)
HGB BLD-MCNC: 12.7 G/DL (ref 12–15.9)
LDLC SERPL CALC-MCNC: ABNORMAL MG/DL
LDLC/HDLC SERPL: ABNORMAL {RATIO}
Lab: ABNORMAL
MCH RBC QN AUTO: 26.2 PG (ref 26.6–33)
MCHC RBC AUTO-ENTMCNC: 33 G/DL (ref 31.5–35.7)
MCV RBC AUTO: 79.4 FL (ref 79–97)
MICROALBUMIN/CREAT UR: NORMAL MG/G{CREAT}
PLATELET # BLD AUTO: 375 10*3/MM3 (ref 140–450)
PMV BLD AUTO: 9.9 FL (ref 6–12)
POTASSIUM SERPL-SCNC: 4.3 MMOL/L (ref 3.5–5.2)
PROT SERPL-MCNC: 7 G/DL (ref 6–8.5)
RBC # BLD AUTO: 4.85 10*6/MM3 (ref 3.77–5.28)
SODIUM SERPL-SCNC: 139 MMOL/L (ref 136–145)
TRIGL SERPL-MCNC: 1130 MG/DL (ref 0–150)
TSH SERPL DL<=0.05 MIU/L-ACNC: 0.75 UIU/ML (ref 0.27–4.2)
VLDLC SERPL-MCNC: ABNORMAL MG/DL
WBC # BLD AUTO: 11.38 10*3/MM3 (ref 3.4–10.8)

## 2021-03-29 PROCEDURE — 99214 OFFICE O/P EST MOD 30 MIN: CPT | Performed by: INTERNAL MEDICINE

## 2021-03-29 PROCEDURE — 82570 ASSAY OF URINE CREATININE: CPT | Performed by: INTERNAL MEDICINE

## 2021-03-29 PROCEDURE — 95251 CONT GLUC MNTR ANALYSIS I&R: CPT | Performed by: INTERNAL MEDICINE

## 2021-03-29 PROCEDURE — 83721 ASSAY OF BLOOD LIPOPROTEIN: CPT | Performed by: INTERNAL MEDICINE

## 2021-03-29 PROCEDURE — 83036 HEMOGLOBIN GLYCOSYLATED A1C: CPT | Performed by: INTERNAL MEDICINE

## 2021-03-29 PROCEDURE — 80061 LIPID PANEL: CPT | Performed by: INTERNAL MEDICINE

## 2021-03-29 PROCEDURE — 86376 MICROSOMAL ANTIBODY EACH: CPT | Performed by: INTERNAL MEDICINE

## 2021-03-29 PROCEDURE — 82306 VITAMIN D 25 HYDROXY: CPT | Performed by: INTERNAL MEDICINE

## 2021-03-29 PROCEDURE — 85027 COMPLETE CBC AUTOMATED: CPT | Performed by: INTERNAL MEDICINE

## 2021-03-29 PROCEDURE — 84443 ASSAY THYROID STIM HORMONE: CPT | Performed by: INTERNAL MEDICINE

## 2021-03-29 PROCEDURE — 80053 COMPREHEN METABOLIC PANEL: CPT | Performed by: INTERNAL MEDICINE

## 2021-03-29 PROCEDURE — 82043 UR ALBUMIN QUANTITATIVE: CPT | Performed by: INTERNAL MEDICINE

## 2021-03-29 RX ORDER — ERGOCALCIFEROL 1.25 MG/1
50000 CAPSULE ORAL WEEKLY
COMMUNITY
End: 2021-04-18 | Stop reason: SDUPTHER

## 2021-03-29 RX ORDER — PIOGLITAZONEHYDROCHLORIDE 30 MG/1
30 TABLET ORAL DAILY
COMMUNITY
End: 2021-04-18

## 2021-03-29 RX ORDER — ASCORBIC ACID 500 MG
500 TABLET ORAL DAILY
COMMUNITY
End: 2021-04-14

## 2021-03-29 NOTE — PROGRESS NOTES
Chief Complaint   Patient presents with   • Diabetes     follow up     HPI:   Natty Fuentes is a 40 y.o.female who returns to Endocrine Clinic for f/u evaluation of multiple medical problems. Last visit 09/28/2020.  Her history is as follows:    Interim Events:   - scheduled to see  Bariatric Clinic regarding bariatric surgery  - pt had to cancel recent f/u appointment due to inclement weather  - Is tolerating Rybelsus, did not tolerate Ozempic or Trulicity  - Is wearing Dexcom CGM  - Taking medications as directed    1) idiopathic hypoparathyroidism:  - diagnosed in 2011 after presenting with symptomatic hypocalcemia  - etiology presumed infiltrative vs autoimmune hypoparathyroidism  - Taking calcitriol 0.50 mcg daily, Caltrate + D 600 mg QID w/ food, occasionally needs extra tab for tingling  - Taking HCTZ 25 mg daily for HTN and to reduce hypercalciuria  - Natpara currently unavailable due to national recall   - last 24 hr urine calcium, Cr: (09/2020): Calcium 175 mg/24 hr, Cr 2.10 gm/24 hr (0.60 - 1.80), urine volume 2,750 mL    2) Type 2 DM with no associated complications at this time:  - diagnosed in 2013  - did not tolerate Ozempic, Victoza, Trulicity samples  - did not tolerate Januvia   - Started insulin in 01/2020 after hospitalization for pneumonia  - Is tolerating Rybelsus    Current Medications:  - metformin 1 gm BID  - Jardiance 25 mg: states she is tolerating  - Rybelsus: 7 mg PO daily - tolerating  - NPH: 44 units qAM and 44 units qHS  - Humalog 26 units TID AC + CF 2 units: 50 > 150    DEXCOM CGM:  SG > 250: 1.9%  SG (181 - 250): 32.7  SG (70 - 180): 67.3%  (Goal > 70%)   SG (54 - 69): 0.1%  SG < 54: 0.0%    Avg Glucose: 169  Glucose Variability: 20.8% (goal </= 35%)  Glucose Management Indicator: N/A    Overall in range throughout the day. Occasional post-prandial hyperglycemia with certain foods.    DM Health Maintenance:  Ophtho: Last visit - 2017, no retinopathy per pt  Podiatry: Last visit  "- no recent visit  Monofilament / Foot exam: DUE  Lipids: see below  Urine microalbumin/Cr ratio: (03/2021) normal  TSH: (03/2021) normal at 0.751  CBC: (03/2021) Hgb 12.7  CMP: (03/2021): Cr 1.07, GFR 57,, LFTs WNL  Aspirin: 81 mg daily - per her PCP, not indicated per ADA guidelines    3) Essential HTN:  - pt taking lisinopril 20 mg daily, metoprolol XL 25 mg daily, HCTZ 25 mg daily  - past evaluation (2016) for secondary causes of HTN showed no endocrine pathology (aldosterone/renin -normal, 1 mg dex suppression test -normal)  - States that carvediolol causing pulse to be too low, lightheadedness. Tolerating metoprolol XL     4) mixed hyperlipidemia:   - not consistently following a low fat diet  Lipid Panel (03/2021): Tchol 213, TG 1,130 (on fenofibrate), HDL 26.6, LDL 86  - taking pravastatin 40 mg, fenofibrate 145 mg daily    5) nontoxic nodular goiter:   (10/04/2011): Pt had a thyroid US and US-Guided FNA of a right lobe/isthmus nodule by  Endocrinology. The US report described a \" nodule measuring 1.74 x 1.06 x 1.31 cm in size is complex, hypoechoic, with indistinct borders. A few small calcifications without coma tail. It has a complex cystic-solid consistency and a grade II vascularity.\"    (10/04/2011): Cytology Report: \"DIAGNOSIS ULTRASOUND GUIDED FNA, RIGHT THYROID NODULE:  ABUNDANT COLLOID, RARE MACROPHAGES, AND SCANT FOLLICULAR GROUPS\"    - Repeat thyroid US was completed in clinic 03/2019:  The thyroid gland was mildly enlarged in size by measured dimensions. Finding are consistent with nodular goiter. No discrete nodules were identified in the left lobe. At the junction of the right lobe and isthmus, a 0.99(L) x 0.68(AP) x 0.80(T) cm hypoechoic mixed cystic and solid nodule was identified. The nodule's margin was not well defined. The nodule had minimal peripheral vascularity and no microcalcifications. Course linear calcifications were present. Although imaging was not available for comparison, " "the nodule appeared to be significantly smaller compared to the outside US report description from 10/2011 and had similar imaging characteristics. Two slightly enlarged left, level III cervical lymph nodes were identified with US characteristics suggestive of benign lymph nodes as described above. No pathologic lymph nodes were seen. Specifically, no superficial mass or enlarged lymph nodes were noted in the left supraclavicular area where patient describes pain and swelling.   RECOMMENDATIONS: Repeat Thyroid US recommended if changes in the gland/neck are noted on physical exam    Other medical history: gout, SANYA on CPAP, GERD     Review of Systems   Constitutional: Negative.         Mild weight gain   HENT: Negative.  Negative for sore throat and trouble swallowing.    Eyes: Negative.    Respiratory: Negative.    Cardiovascular: Negative.    Gastrointestinal: Negative.    Endocrine:        Denies hypoglycermia   Genitourinary: Negative.    Musculoskeletal: Positive for arthralgias.   Skin: Negative.    Allergic/Immunologic: Negative.    Neurological: Negative.    Hematological: Negative.    Psychiatric/Behavioral: Negative.        The following portions of the patient's history were reviewed and updated as appropriate: allergies, current medications, past family history, past medical history, past social history, past surgical history and problem list.      /78   Pulse 77   Ht 157.5 cm (62\")   Wt 121 kg (266 lb)   SpO2 98%   BMI 48.65 kg/m²   Physical Exam   Constitutional: She is oriented to person, place, and time. She appears well-developed. No distress.   obese   HENT:   Head: Normocephalic.   Eyes: Pupils are equal, round, and reactive to light. Conjunctivae are normal.   Neck: No tracheal deviation present. Thyromegaly (mild) present.   No palpable thyroid nodules. No tenderness to palpation     Cardiovascular: Normal rate, regular rhythm and normal heart sounds.   No murmur " heard.  Pulmonary/Chest: Effort normal and breath sounds normal. No respiratory distress.   Abdominal: Soft. Bowel sounds are normal. She exhibits no mass. There is no abdominal tenderness.   No scarring at insulin injection sites       Lymphadenopathy:     She has no cervical adenopathy.   Neurological: She is alert and oriented to person, place, and time. No cranial nerve deficit.   Skin: Skin is warm and dry. She is not diaphoretic. No erythema.   No acanthosis nigricans   Psychiatric: Her behavior is normal.   Vitals reviewed.     LABS/IMAGING:  Outside labs reviewed and summarized in HPI  Office Visit on 03/29/2021   Component Date Value Ref Range Status   • Glucose 03/29/2021 170* 70 - 130 mg/dL Final   • Lot Number 03/29/2021 [id number]   Final   • Expiration Date 03/29/2021 10/12/2021   Final   • Hemoglobin A1C 03/29/2021 6.8  % Final   • WBC 03/29/2021 11.38* 3.40 - 10.80 10*3/mm3 Final   • RBC 03/29/2021 4.85  3.77 - 5.28 10*6/mm3 Final   • Hemoglobin 03/29/2021 12.7  12.0 - 15.9 g/dL Final   • Hematocrit 03/29/2021 38.5  34.0 - 46.6 % Final   • MCV 03/29/2021 79.4  79.0 - 97.0 fL Final   • MCH 03/29/2021 26.2* 26.6 - 33.0 pg Final   • MCHC 03/29/2021 33.0  31.5 - 35.7 g/dL Final   • RDW 03/29/2021 17.2* 12.3 - 15.4 % Final   • RDW-SD 03/29/2021 49.3  37.0 - 54.0 fl Final   • MPV 03/29/2021 9.9  6.0 - 12.0 fL Final   • Platelets 03/29/2021 375  140 - 450 10*3/mm3 Final   • Glucose 03/29/2021 170* 65 - 99 mg/dL Final   • BUN 03/29/2021 24* 6 - 20 mg/dL Final   • Creatinine 03/29/2021 1.07* 0.57 - 1.00 mg/dL Final   • Sodium 03/29/2021 139  136 - 145 mmol/L Final   • Potassium 03/29/2021 4.3  3.5 - 5.2 mmol/L Final   • Chloride 03/29/2021 99  98 - 107 mmol/L Final   • CO2 03/29/2021 22.2  22.0 - 29.0 mmol/L Final   • Calcium 03/29/2021 8.3* 8.6 - 10.5 mg/dL Final   • Total Protein 03/29/2021 7.0  6.0 - 8.5 g/dL Final   • Albumin 03/29/2021 4.30  3.50 - 5.20 g/dL Final   • ALT (SGPT) 03/29/2021 11  1 - 33 U/L  Final   • AST (SGOT) 03/29/2021 15  1 - 32 U/L Final   • Alkaline Phosphatase 03/29/2021 63  39 - 117 U/L Final   • Total Bilirubin 03/29/2021 0.3  0.0 - 1.2 mg/dL Final   • eGFR Non African Amer 03/29/2021 57* >60 mL/min/1.73 Final   • Globulin 03/29/2021 2.7  gm/dL Final   • A/G Ratio 03/29/2021 1.6  g/dL Final   • BUN/Creatinine Ratio 03/29/2021 22.4  7.0 - 25.0 Final   • Anion Gap 03/29/2021 17.8* 5.0 - 15.0 mmol/L Final   • Total Cholesterol 03/29/2021 213* 0 - 200 mg/dL Final   • Triglycerides 03/29/2021 1,130* 0 - 150 mg/dL Final   • HDL Cholesterol 03/29/2021 29* 40 - 60 mg/dL Final   • LDL Cholesterol  03/29/2021    Final    Unable to calculate   • VLDL Cholesterol 03/29/2021    Final    Unable to calculate   • LDL/HDL Ratio 03/29/2021    Final    Unable to calculate   • Microalbumin/Creatinine Ratio 03/29/2021    Final    Unable to calculate   • Creatinine, Urine 03/29/2021 45.9  mg/dL Final   • Microalbumin, Urine 03/29/2021 <1.2  mg/dL Final   • TSH 03/29/2021 0.751  0.270 - 4.200 uIU/mL Final   • 25 Hydroxy, Vitamin D 03/29/2021 19.0  ng/ml Final   • Thyroid Peroxidase Antibody 03/29/2021 <9  0 - 34 IU/mL Final   • LDL Cholesterol  03/29/2021 86  0 - 100 mg/dL Final       ASSESSMENT/PLAN:    1) Type 2 DM w/o complication at this time: controlled, A1C% 6.8 today  CGM reviewed, see above.  Continue the following:  - metformin 1gm BID  - Jardiance 25 mg qAM: states she is tolerating, advised to increase her water intake on this medication  - Rybelsus: 7 mg PO daily  - NPH: 44 units qAM and qHS  - Humalog 26 units TID AC + CF 2 units: 50 > 150    2) idiopathic hypoparathyroidism: controlled  - CMP checked today, Calcium at goal  - d/w pt that goal calcium is 7.0 - 8.5 to minimize calcium-phosphorus product deposition  - continue calcitriol to 0.50 mcg daily, Caltrate + D 600 mg QID w/ food  - CMP, 24 hr urine calcium/creatinine compelted 09/24/2020 and reviewed. Due this year    3) mixed hyperlipidemia:  uncontrolled hypertriglyceridemia  - Lipid panel checked today, not fasting  - continue pravastatin 40 mg daily given her h/o DM   - continue fenofibrate 145 mg daily consistently   - offered Rx for Rx fish oil  (Lovaza or Vascepa) pt declined due to cost. Advised pt to take OTC fish oil. Start with 2 caps daily, titrate to 2 caps BID as tolerated  - advised to follow low fat diet    4) essential HTN:  - continue metoprolol ER 25 mg daily  - continue Lisinopril 20 mg daily, HCTZ 25 daily    5) nontoxic nodular goiter: stable on exam  - Thyroid US from 03/2019 reviewed  - Repeat US based on changes in physical exam    Labs checked today and reviewed, summarized in HPI.     RTC 5 months  Signed: Therese Cintron MD

## 2021-03-31 LAB — THYROPEROXIDASE AB SERPL-ACNC: <9 IU/ML (ref 0–34)

## 2021-04-07 DIAGNOSIS — E11.65 TYPE 2 DIABETES MELLITUS WITH HYPERGLYCEMIA, WITHOUT LONG-TERM CURRENT USE OF INSULIN (HCC): ICD-10-CM

## 2021-04-14 ENCOUNTER — OFFICE VISIT (OUTPATIENT)
Dept: BARIATRICS/WEIGHT MGMT | Facility: CLINIC | Age: 41
End: 2021-04-14

## 2021-04-14 ENCOUNTER — DOCUMENTATION (OUTPATIENT)
Dept: BARIATRICS/WEIGHT MGMT | Facility: CLINIC | Age: 41
End: 2021-04-14

## 2021-04-14 VITALS
HEIGHT: 64 IN | DIASTOLIC BLOOD PRESSURE: 78 MMHG | HEART RATE: 101 BPM | OXYGEN SATURATION: 99 % | SYSTOLIC BLOOD PRESSURE: 142 MMHG | BODY MASS INDEX: 44.98 KG/M2 | WEIGHT: 263.5 LBS | TEMPERATURE: 97.8 F | RESPIRATION RATE: 18 BRPM

## 2021-04-14 DIAGNOSIS — E11.65 UNCONTROLLED TYPE 2 DIABETES MELLITUS WITH HYPERGLYCEMIA (HCC): ICD-10-CM

## 2021-04-14 DIAGNOSIS — R60.0 LOWER EXTREMITY EDEMA: ICD-10-CM

## 2021-04-14 DIAGNOSIS — G47.30 SLEEP APNEA, UNSPECIFIED TYPE: ICD-10-CM

## 2021-04-14 DIAGNOSIS — R53.83 FATIGUE, UNSPECIFIED TYPE: ICD-10-CM

## 2021-04-14 DIAGNOSIS — M19.90 ARTHRITIS: ICD-10-CM

## 2021-04-14 DIAGNOSIS — K21.9 GASTROESOPHAGEAL REFLUX DISEASE, UNSPECIFIED WHETHER ESOPHAGITIS PRESENT: ICD-10-CM

## 2021-04-14 DIAGNOSIS — E78.2 MIXED HYPERLIPIDEMIA: ICD-10-CM

## 2021-04-14 DIAGNOSIS — N18.9 CHRONIC KIDNEY DISEASE, UNSPECIFIED CKD STAGE: ICD-10-CM

## 2021-04-14 DIAGNOSIS — E66.01 MORBIDLY OBESE (HCC): ICD-10-CM

## 2021-04-14 DIAGNOSIS — E78.5 HYPERLIPIDEMIA, UNSPECIFIED HYPERLIPIDEMIA TYPE: ICD-10-CM

## 2021-04-14 DIAGNOSIS — E11.69 TYPE 2 DIABETES MELLITUS WITH OTHER SPECIFIED COMPLICATION, UNSPECIFIED WHETHER LONG TERM INSULIN USE (HCC): ICD-10-CM

## 2021-04-14 DIAGNOSIS — R10.13 DYSPEPSIA: Primary | ICD-10-CM

## 2021-04-14 DIAGNOSIS — Z99.89 OSA ON CPAP: ICD-10-CM

## 2021-04-14 DIAGNOSIS — I10 ESSENTIAL HYPERTENSION: ICD-10-CM

## 2021-04-14 DIAGNOSIS — E20.0 IDIOPATHIC HYPOPARATHYROIDISM (HCC): ICD-10-CM

## 2021-04-14 DIAGNOSIS — I10 HYPERTENSION, UNSPECIFIED TYPE: ICD-10-CM

## 2021-04-14 DIAGNOSIS — G47.33 OSA ON CPAP: ICD-10-CM

## 2021-04-14 PROCEDURE — 99204 OFFICE O/P NEW MOD 45 MIN: CPT | Performed by: PHYSICIAN ASSISTANT

## 2021-04-14 RX ORDER — LORATADINE 10 MG/1
10 TABLET ORAL DAILY
COMMUNITY
Start: 2021-04-09

## 2021-04-14 RX ORDER — PREDNISONE 10 MG/1
10 TABLET ORAL AS NEEDED
COMMUNITY
Start: 2021-04-09 | End: 2021-05-06 | Stop reason: ALTCHOICE

## 2021-04-14 NOTE — PROGRESS NOTES
[facility] GROUP BARIATRIC SURGERY  [address]    Formerly [facility] [address] [phone]      Patient  Name:  Natty Fuentes  :  1980        Chief Complaint:  weight gain; unable to maintain weight loss    History of Present Illness:  Natty Fuentes is a 40 y.o. female who presents today for evaluation, education and consultation regarding bariatric and metabolic surgery. The patient is interested in sleeve gastrectomy with Dr. Salgado.     Natty has been overweight for at least 30 years, has been 35 pounds or more overweight for at least 20 years, has been 100 pounds or more overweight for 15 or more years and started dieting at age 15.      Previous diet attempts include: Low Carbohydrate, Nathalie's Diet, [place] and Slim Fast; Weight Watchers; Dexatrim and Ionamin/Adipex.  The most weight Natty lost was 25 pounds on Weight watchers but was only able to maintain that weight loss for 6 months.  Her maximum lifetime weight is 280 pounds.    As above, patient has been overweight for many years, with numerous failed dietary/weight loss attempts.  She now has obesity related comorbidities and as such has decided to pursue weight loss surgery.    H/o HTN, HLD, LE edema, IDDM dx , on insulin since 2021, last A1C 6.8, sees endocrine, sleep apnea CPAP compliant. H/o renal calculi, hypoparathyroidism on Ca and D supplements, CKD. Arthritis/ gout- CRP/ sed rate elevated, sees rheumatologist, questionable AI disease, has prednisone prn to take for flares, last .     GI: chronic reflux controlled on protonix 40mg daily, EGD 2017 with reported ulcer, schlatzis ring. S/p rukhsana with gallstones. No other GI complaints.      All other other past medical, surgical, social and family history have been obtained and discussed as pertinent to bariatric surgery as below.     Past Medical History:   Diagnosis Date   • Acid reflux     controlled on protonix 40mg daily, EGD 2017 with ulcer,  schlatzis ring   • Arthritis     CRP/ sed rate elevated, sees rheumatologist, questionable AI disease   • CKD (chronic kidney disease)    • Fatigue    • GERD (gastroesophageal reflux disease)    • Gout     has prednisone prn to take for flares, last 2019   • Hyperlipidemia    • Hypertension    • Hypocalcemia    • Hypoparathyroidism (CMS/[facility])     on cacium supplements and Vit D   • Lower extremity edema    • Morbid obesity with BMI of 45.0-49.9, adult (CMS/[facility])    • Obesity    • Renal calculi    • S/P rukhsana     gallstones   • Sleep apnea     CPAP compliant   • Type 2 diabetes mellitus (CMS/[facility])     dx 2001, on insulin since 1/2021, last A1C 6.8, sees endocrine     Past Surgical History:   Procedure Laterality Date   • LAPAROSCOPIC CHOLECYSTECTOMY  2006    gallstones   • OTHER SURGICAL HISTORY      Elbow surgery       Allergies   Allergen Reactions   • Penicillins Rash   • Rocephin [Ceftriaxone] Rash       Current Outpatient Medications:   •  allopurinol (ZYLOPRIM) 300 MG tablet, Take  by mouth., Disp: , Rfl:   •  aspirin 81 MG EC tablet, Take 81 mg by mouth Daily., Disp: , Rfl:   •  Blood Glucose Monitoring Suppl (True Metrix Meter) device, 1 Device Take As Directed., Disp: 1 Device, Rfl: 0  •  calcitriol (ROCALTROL) 0.5 MCG capsule, Take 1 capsule by mouth Daily., Disp: 90 capsule, Rfl: 3  •  Calcium Carb-Cholecalciferol (CALCIUM 600 + D) 600-200 MG-UNIT tablet, Take 600 mg by mouth 4 (Four) Times a Day., Disp: , Rfl:   •  Continuous Blood Gluc Sensor (Dexcom G6 Sensor), Every 10 (Ten) Days., Disp: 3 each, Rfl: 11  •  Continuous Blood Gluc Transmit (Dexcom G6 Transmitter) misc, 1 each Every 3 (Three) Months., Disp: 1 each, Rfl: 4  •  Empagliflozin 25 MG tablet, Take 25 mg by mouth Every Morning., Disp: 30 tablet, Rfl: 11  •  fenofibrate (TRICOR) 145 MG tablet, TAKE ONE TABLET BY MOUTH EVERY DAY, Disp: 90 tablet, Rfl: 3  •  Insulin Lispro, 1 Unit Dial, (HumaLOG KwikPen) 100 UNIT/ML solution pen-injector, Inject 26  Units under the skin into the appropriate area as directed 3 (Three) Times a Day Before Meals., Disp: 75 mL, Rfl: 3  •  Insulin Pen Needle (B-D ULTRAFINE III SHORT PEN) 31G X 8 MM misc, 1 each by Other route 5 (Five) Times a Day., Disp: 200 each, Rfl: 5  •  Lancets 30G misc, True Metrix Test 5 times dailt, Disp: 360 each, Rfl: 3  •  lisinopril (PRINIVIL,ZESTRIL) 40 MG tablet, Take 20 mg by mouth Daily., Disp: , Rfl:   •  loratadine (CLARITIN) 10 MG tablet, Take 10 mg by mouth Daily., Disp: , Rfl:   •  metFORMIN (GLUCOPHAGE) 1000 MG tablet, Take 1 tablet by mouth 2 (Two) Times a Day With Meals., Disp: 180 tablet, Rfl: 3  •  pantoprazole (PROTONIX) 40 MG EC tablet, Take  by mouth., Disp: , Rfl:   •  pravastatin (PRAVACHOL) 40 MG tablet, Take 1 tablet by mouth Daily., Disp: 90 tablet, Rfl: 3  •  Semaglutide (Rybelsus) 7 MG tablet, Take 1 tablet by mouth Every Morning., Disp: 30 tablet, Rfl: 11  •  True Metrix Blood Glucose Test test strip, 1 each by Other route 5 (Five) Times a Day. Use as instructed, Disp: 150 each, Rfl: 5  •  Calcium-Vitamin D (CALTRATE 600 PLUS-VIT D PO), Take 1 tablet by mouth 4 (four) times a day., Disp: , Rfl:   •  hydroCHLOROthiazide (HYDRODIURIL) 25 MG tablet, Take 1 tablet by mouth Daily., Disp: 90 tablet, Rfl: 3  •  metoprolol succinate XL (TOPROL-XL) 25 MG 24 hr tablet, TAKE ONE TABLET BY MOUTH EVERY DAY, Disp: 90 tablet, Rfl: 3  •  predniSONE (DELTASONE) 10 MG tablet, Take 10 mg by mouth As Needed., Disp: , Rfl:   •  vitamin D (ERGOCALCIFEROL) 1.25 MG (08264 UT) capsule capsule, Take 1 capsule by mouth 1 (One) Time Per Week for 10 doses., Disp: 10 capsule, Rfl: 0    Social History     Socioeconomic History   • Marital status: Single     Spouse name: Not on file   • Number of children: Not on file   • Years of education: Not on file   • Highest education level: Not on file   Tobacco Use   • Smoking status: Never Smoker   • Smokeless tobacco: Never Used   Vaping Use   • Vaping Use: Never used    Substance and Sexual Activity   • Alcohol use: Never   • Drug use: Never   • Sexual activity: Not Currently     Family History   Problem Relation Age of Onset   • Arthritis Other    • Colon cancer Other    • Hyperlipidemia Other    • Hypertension Other    • Obesity Other    • Diabetes Other    • Kidney disease Other    • Thyroid disease Other    • Stroke Other    • Hypertension Mother    • Hyperlipidemia Mother    • Lupus Mother    • Cancer Mother    • Pulmonary embolism Mother    • Obesity Mother    • Diabetes Mother    • Diabetes Father    • Kidney failure Father    • Hypertension Father    • Hyperlipidemia Father    • Obesity Father    • Sleep apnea Father    • Hyperlipidemia Sister    • Hypertension Sister    • Diabetes Sister    • Pulmonary embolism Sister    • Obesity Sister    • Sleep apnea Sister    • Hypertension Brother    • Hyperlipidemia Brother    • Diabetes Brother    • Obesity Brother    • Sleep apnea Brother    • Pulmonary embolism Sister    • Obesity Maternal Grandmother    • Diabetes Maternal Grandmother    • Hypertension Maternal Grandmother    • Pulmonary embolism Maternal Grandmother    • Hypertension Maternal Grandfather    • Pulmonary embolism Maternal Grandfather    • Obesity Paternal Grandmother    • Diabetes Paternal Grandmother    • Hypertension Paternal Grandmother    • Sleep apnea Paternal Grandmother    • Obesity Paternal Grandfather    • Hypertension Paternal Grandfather    • Heart attack Paternal Grandfather        Review of Systems:  Constitutional:  Reports fatigue, weight gain and denies fevers, chills.  HEENT:  denies headache, ear pain or loss of hearing, blurred or double vision, nasal discharge or sore throat.  Cardiovascular:  Reports HTN, HLD, edema and denies CAD, Atrial Fib, hx heart disease, heart murmur, hx MI, chest pain, palpitations, hx DVT.  Respiratory:  Reports sleep apnea and denies dyspnea on exertion, shortness of breath , cough , wheezing, asthma, hx  PE.  Gastrointestinal:  Reports heartburn, gallbladder issues and denies dysphagia, nausea, vomiting, abdominal pain, IBS, diarrhea, constipation, melena, blood in stool, liver disease, hx pancreatitis.  Genitourinary:  Reports none and denies history of  frequent UTI, incontinence, hematuria, dysuria, polyuria, polydipsia, renal insufficiency, renal failure.    Musculoskeletal:  Reports joint pain and denies fibromyalgia, arthritis and autoimmune disease.  Neurological:  Reports none and denies headaches, migraines, numbness /tingling, dizziness, confusion, seizure, stroke.  Psychiatric:  Reports none and denies depressed mood, hx depression, feeling anxious, hx anxiety, bipolar disorder, suicidal ideation, hx suicide attempt, hx self injury, hx substance abuse, eating disorder.  Endocrine:  Reports diabetes, gout, hypoparathyroidism and denies thyroid disease.  Hematologic:  Reports none and denies bruising, bleeding disorder, hx anemia, hx blood transfusion.  Skin:  Reports none and denies rashes, hx MRSA.      Physical Exam:  Vital Signs:  Weight: 120 kg (263 lb 8 oz)   Body mass index is 45.23 kg/m².  Temp: 97.8 °F (36.6 °C)   Heart Rate: 101   BP: 142/78     Physical Exam  Vitals reviewed.   Constitutional:       Appearance: She is well-developed. She is obese.   HENT:      Head: Normocephalic.      Comments: mask  Neck:      Thyroid: No thyromegaly.   Cardiovascular:      Rate and Rhythm: Normal rate and regular rhythm.      Heart sounds: Normal heart sounds.   Pulmonary:      Effort: Pulmonary effort is normal. No respiratory distress.      Breath sounds: Normal breath sounds. No wheezing.   Abdominal:      General: Bowel sounds are normal. There is no distension.      Palpations: Abdomen is soft.      Tenderness: There is no abdominal tenderness.      Comments: Lap scars  glucometer   Musculoskeletal:         General: Normal range of motion.      Cervical back: Normal range of motion and neck supple.    Skin:     General: Skin is warm and dry.   Neurological:      Mental Status: She is alert and oriented to person, place, and time.   Psychiatric:         Mood and Affect: Mood normal.         Behavior: Behavior normal.         Thought Content: Thought content normal.         Judgment: Judgment normal.         Patient Active Problem List   Diagnosis   • Arthritis   • GERD (gastroesophageal reflux disease)   • Gout   • Mixed hyperlipidemia   • Idiopathic hypoparathyroidism (CMS/HCC)   • Essential hypertension   • Morbidly obese (CMS/HCC)   • Uncontrolled type 2 diabetes mellitus with hyperglycemia (CMS/[facility])   • SANYA on CPAP   • Generalized edema   • Nodular goiter   • Hypertension   • Hyperlipidemia   • Lower extremity edema   • Fatigue   • Morbid obesity with BMI of 45.0-49.9, adult (CMS/[facility])   • Type 2 diabetes mellitus (CMS/HCC)   • Sleep apnea   • S/P rukhsana   • Obesity   • Hypocalcemia   • CKD (chronic kidney disease)   • Acid reflux       Assessment:    Natty Fuentes is a 40 y.o. year old female with medically complicated obesity pursuing sleeve gastrectomy.    Weight loss surgery is deemed medically necessary given the following obesity related comorbidities including sleep apnea, diabetes, hypertension, dyslipidemia, osteoarthritis, knee pain, GERD and edema with current Weight: 120 kg (263 lb 8 oz) and Body mass index is 45.23 kg/m²..    Plan:  The consultation plan and program requirements were reviewed with the patient.  The patient has been advised that a letter of medical support must be obtained from her primary care physician or referring provider. A psychological evaluation will be arranged.  A nutritional evaluation will be performed.  The patient was advised to start a high protein and low carbohydrate diet.  Necessary lifestyle modifications were discussed.  Instructions on how to access NATALI was given to the patient.  NATALI is an internet based educational video that explains the surgical  procedure chosen and answers basic questions regarding that procedure.     Patient's Body mass index is 45.23 kg/m². BMI is above normal parameters. Recommendations include: exercise counseling, referral to a nutritionist and bariatric surgery.        Preoperative testing will include: CBC, CMP, Lipids, TSH, HgA1C, EKG, CXR, Gastric Emptying Study, EGD and HP serum    The risks and benefits of the upper endoscopy were discussed with the patient in detail and all questions were answered.  Possibility of perforation, bleeding, aspiration, and anesthesia reaction were reviewed.  Patient agrees to proceed.      Additional preop clearances required prior to surgery: Cardiology and Nephrology.      The patient has been educated on expected postoperative lifestyle changes, including commitment to high protein diet, vitamin regimen, and exercise program.  They are aware that support groups are encouraged for optimal weight loss results. Patient understands that bariatric surgery is not cosmetic surgery but rather a tool to help make a lifelong commitment to lifestyle changes including diet, exercise, behavior modifications, and healthy habits. The procedure was discussed with the patient and all questions were answered. The importance of avoiding ASA/ NSAIDS/ steroids/ tobacco/ hormones/ immunomodulators perioperatively was discussed.         Parris Sanchez PA-C

## 2021-04-14 NOTE — PROGRESS NOTES
"Metabolic and Bariatric Surgery  Presurgical Nutrition Assessment     Natty Fuentes  04/14/2021  [id number]  [id number]  1980  female    Surgery desired: Sleeve Gastrectomy     Ht 162.6 cm (64\")   Wt 120 kg (263 lb 8 oz)  Past Medical History:   Diagnosis Date   • Arthritis    • GERD (gastroesophageal reflux disease)    • Gout    • Hyperlipidemia    • Hypertension    • Hypocalcemia    • Hypoparathyroidism (CMS/HCC)    • Obesity    • Renal calculi    • Type 2 diabetes mellitus (CMS/HCC)      Past Surgical History:   Procedure Laterality Date   • CHOLECYSTECTOMY     • OTHER SURGICAL HISTORY      Elbow surgery     Allergies   Allergen Reactions   • Penicillins Rash   • Rocephin [Ceftriaxone] Rash       Current Outpatient Medications:   •  allopurinol (ZYLOPRIM) 300 MG tablet, Take  by mouth., Disp: , Rfl:   •  aspirin 81 MG EC tablet, Take 81 mg by mouth Daily., Disp: , Rfl:   •  Blood Glucose Monitoring Suppl (True Metrix Meter) device, 1 Device Take As Directed., Disp: 1 Device, Rfl: 0  •  calcitriol (ROCALTROL) 0.5 MCG capsule, Take 1 capsule by mouth Daily., Disp: 90 capsule, Rfl: 3  •  Calcium Carb-Cholecalciferol (CALCIUM 600 + D) 600-200 MG-UNIT tablet, Take 600 mg by mouth 4 (Four) Times a Day., Disp: , Rfl:   •  Calcium-Vitamin D (CALTRATE 600 PLUS-VIT D PO), Take 1 tablet by mouth 4 (four) times a day., Disp: , Rfl:   •  Continuous Blood Gluc Sensor (Dexcom G6 Sensor), Every 10 (Ten) Days., Disp: 3 each, Rfl: 11  •  Continuous Blood Gluc Transmit (Dexcom G6 Transmitter) misc, 1 each Every 3 (Three) Months., Disp: 1 each, Rfl: 4  •  Empagliflozin 25 MG tablet, Take 25 mg by mouth Every Morning., Disp: 30 tablet, Rfl: 11  •  fenofibrate (TRICOR) 145 MG tablet, TAKE ONE TABLET BY MOUTH EVERY DAY, Disp: 90 tablet, Rfl: 3  •  hydrochlorothiazide (HYDRODIURIL) 25 MG tablet, Take 25 mg by mouth As Needed (for edema in legs)., Disp: , Rfl:   •  Insulin Lispro, 1 Unit Dial, (HumaLOG KwikPen) 100 UNIT/ML " solution pen-injector, Inject 26 Units under the skin into the appropriate area as directed 3 (Three) Times a Day Before Meals., Disp: 75 mL, Rfl: 3  •  Insulin Pen Needle (B-D ULTRAFINE III SHORT PEN) 31G X 8 MM misc, 1 each by Other route 5 (Five) Times a Day., Disp: 200 each, Rfl: 5  •  Lancets 30G misc, True Metrix Test 5 times dailt, Disp: 360 each, Rfl: 3  •  lisinopril (PRINIVIL,ZESTRIL) 40 MG tablet, Take 20 mg by mouth Daily., Disp: , Rfl:   •  loratadine (CLARITIN) 10 MG tablet, Take 10 mg by mouth Daily., Disp: , Rfl:   •  metFORMIN (GLUCOPHAGE) 1000 MG tablet, Take 1 tablet by mouth 2 (Two) Times a Day With Meals., Disp: 180 tablet, Rfl: 3  •  metoprolol succinate XL (TOPROL-XL) 25 MG 24 hr tablet, TAKE ONE TABLET BY MOUTH EVERY DAY, Disp: 90 tablet, Rfl: 3  •  pantoprazole (PROTONIX) 40 MG EC tablet, Take  by mouth., Disp: , Rfl:   •  pioglitazone (ACTOS) 30 MG tablet, Take 30 mg by mouth Daily., Disp: , Rfl:   •  pravastatin (PRAVACHOL) 40 MG tablet, Take 1 tablet by mouth Daily., Disp: 90 tablet, Rfl: 3  •  predniSONE (DELTASONE) 10 MG tablet, Take 10 mg by mouth As Needed., Disp: , Rfl:   •  Semaglutide (Rybelsus) 7 MG tablet, Take 1 tablet by mouth Every Morning., Disp: 30 tablet, Rfl: 11  •  True Metrix Blood Glucose Test test strip, 1 each by Other route 5 (Five) Times a Day. Use as instructed, Disp: 150 each, Rfl: 5  •  vitamin D (ERGOCALCIFEROL) 1.25 MG (15758 UT) capsule capsule, Take 50,000 Units by mouth 1 (One) Time Per Week., Disp: , Rfl:       Nutrition Assessment    Estimated energy needs: 2220    Estimated calories for weight loss: 1400    IBW (Pounds):  163    Excess body weight (Pounds): 100       Nutrition Recall  24 Hour recall: (B) (L) (D) -  Reviewed and discussed with patient  7am:  Egg, caballero, jelly  Lunch:  6 boneless chicken wings, white rice  Dinner:  Taco salad  9pm:  2 mini burgers  Water, diet soda, powerade zero     Exercise  rarely      Education    Provided manual for  Sleeve Gastrectomy and reviewed necessary vitamin and protein supplementation for surgery.     Provided follow-up options for support, including contact information for dietitians here, if desired.  Web-based support information and apps for smart phones and computers given.  Noted that support group is offered at this clinic, and that support is associated with weight loss.    Recommend that team follow per protocol.      Nutrition Goals   Dietary Guidelines per manual  Protein goal:  grams per day   Eliminate soda    Exercise Goals  Add 15-30 minutes of activity per day as tolerated        Deirdre Mckeon RD  04/14/2021  15:06 EDT

## 2021-04-15 ENCOUNTER — TELEPHONE (OUTPATIENT)
Dept: ENDOCRINOLOGY | Facility: CLINIC | Age: 41
End: 2021-04-15

## 2021-04-15 LAB
H PYLORI IGA SER-ACNC: 22.8 UNITS (ref 0–8.9)
H PYLORI IGG SER IA-ACNC: 0.2 INDEX VALUE (ref 0–0.79)
H PYLORI IGM SER-ACNC: <9 UNITS (ref 0–8.9)

## 2021-04-15 NOTE — TELEPHONE ENCOUNTER
Spoke to patient about lab results. See clinic note from 03/29/2021 for details.   Signed: Therese Cintron MD

## 2021-04-16 RX ORDER — METOPROLOL SUCCINATE 25 MG/1
TABLET, EXTENDED RELEASE ORAL
Qty: 90 TABLET | Refills: 3 | Status: SHIPPED | OUTPATIENT
Start: 2021-04-16 | End: 2021-11-01 | Stop reason: SDUPTHER

## 2021-04-18 DIAGNOSIS — E20.0 IDIOPATHIC HYPOPARATHYROIDISM (HCC): Primary | ICD-10-CM

## 2021-04-18 RX ORDER — ERGOCALCIFEROL 1.25 MG/1
50000 CAPSULE ORAL WEEKLY
Qty: 10 CAPSULE | Refills: 0 | Status: SHIPPED | OUTPATIENT
Start: 2021-04-18 | End: 2021-09-16

## 2021-04-19 DIAGNOSIS — I10 ESSENTIAL HYPERTENSION: Primary | ICD-10-CM

## 2021-04-19 DIAGNOSIS — E20.0 IDIOPATHIC HYPOPARATHYROIDISM (HCC): ICD-10-CM

## 2021-04-19 DIAGNOSIS — R76.8 HELICOBACTER PYLORI ANTIBODY POSITIVE: Primary | ICD-10-CM

## 2021-04-19 DIAGNOSIS — R94.4 DECREASED GFR: Primary | ICD-10-CM

## 2021-04-19 RX ORDER — HYDROCHLOROTHIAZIDE 25 MG/1
25 TABLET ORAL DAILY
Qty: 90 TABLET | Refills: 3 | Status: SHIPPED | OUTPATIENT
Start: 2021-04-19 | End: 2021-06-04

## 2021-05-06 ENCOUNTER — OFFICE VISIT (OUTPATIENT)
Dept: CARDIOLOGY | Facility: CLINIC | Age: 41
End: 2021-05-06

## 2021-05-06 ENCOUNTER — LAB (OUTPATIENT)
Dept: LAB | Facility: HOSPITAL | Age: 41
End: 2021-05-06

## 2021-05-06 VITALS
WEIGHT: 265 LBS | OXYGEN SATURATION: 99 % | DIASTOLIC BLOOD PRESSURE: 60 MMHG | HEIGHT: 64 IN | HEART RATE: 90 BPM | BODY MASS INDEX: 45.24 KG/M2 | TEMPERATURE: 98.2 F | SYSTOLIC BLOOD PRESSURE: 96 MMHG

## 2021-05-06 DIAGNOSIS — E78.2 MIXED HYPERLIPIDEMIA: ICD-10-CM

## 2021-05-06 DIAGNOSIS — G47.33 OSA ON CPAP: ICD-10-CM

## 2021-05-06 DIAGNOSIS — E66.01 MORBID OBESITY WITH BMI OF 45.0-49.9, ADULT (HCC): ICD-10-CM

## 2021-05-06 DIAGNOSIS — R06.02 SOB (SHORTNESS OF BREATH): ICD-10-CM

## 2021-05-06 DIAGNOSIS — I10 ESSENTIAL HYPERTENSION: ICD-10-CM

## 2021-05-06 DIAGNOSIS — Z99.89 OSA ON CPAP: ICD-10-CM

## 2021-05-06 DIAGNOSIS — R76.8 HELICOBACTER PYLORI ANTIBODY POSITIVE: ICD-10-CM

## 2021-05-06 DIAGNOSIS — Z01.818 PREOPERATIVE CLEARANCE: Primary | ICD-10-CM

## 2021-05-06 PROCEDURE — 83013 H PYLORI (C-13) BREATH: CPT

## 2021-05-06 PROCEDURE — 93000 ELECTROCARDIOGRAM COMPLETE: CPT | Performed by: INTERNAL MEDICINE

## 2021-05-06 PROCEDURE — 99204 OFFICE O/P NEW MOD 45 MIN: CPT | Performed by: INTERNAL MEDICINE

## 2021-05-06 NOTE — PROGRESS NOTES
Subjective   Chief Complaint   Patient presents with   • Surgical Clearance     Bariatric Surgery       History of Present Illness  Patient is 40 years old white female who is here for preop cardiac clearance.  She is planning to have gastric sleeve for weight loss.    She is morbidly obese with BMI of 45.49 and has multiple comorbid conditions.  Essential hypertension  Hyperlipidemia uncontrolled  Controlled diabetes mellitus  Sleep apnea and chronic kidney disease.    Patient has no prior cardiac history denies any chest pain or palpitations gets short of breath which is probably related to obesity.  He has obstructive sleep apnea but no COPD.  She is using CPAP at night.    Hyperlipidemia is very poorly controlled  Triglyceride is  1130 and cholesterol 213.  Her LDL is normal at 86          Past Surgical History:   Procedure Laterality Date   • LAPAROSCOPIC CHOLECYSTECTOMY  2006    gallstones   • OTHER SURGICAL HISTORY      Elbow surgery     Family History   Problem Relation Age of Onset   • Arthritis Other    • Colon cancer Other    • Hyperlipidemia Other    • Hypertension Other    • Obesity Other    • Diabetes Other    • Kidney disease Other    • Thyroid disease Other    • Stroke Other    • Hypertension Mother    • Hyperlipidemia Mother    • Lupus Mother    • Cancer Mother    • Pulmonary embolism Mother    • Obesity Mother    • Diabetes Mother    • Diabetes Father    • Kidney failure Father    • Hypertension Father    • Hyperlipidemia Father    • Obesity Father    • Sleep apnea Father    • Hyperlipidemia Sister    • Hypertension Sister    • Diabetes Sister    • Pulmonary embolism Sister    • Obesity Sister    • Sleep apnea Sister    • Hypertension Brother    • Hyperlipidemia Brother    • Diabetes Brother    • Obesity Brother    • Sleep apnea Brother    • Pulmonary embolism Sister    • Obesity Maternal Grandmother    • Diabetes Maternal Grandmother    • Hypertension Maternal Grandmother    • Pulmonary embolism  Maternal Grandmother    • Hypertension Maternal Grandfather    • Pulmonary embolism Maternal Grandfather    • Obesity Paternal Grandmother    • Diabetes Paternal Grandmother    • Hypertension Paternal Grandmother    • Sleep apnea Paternal Grandmother    • Obesity Paternal Grandfather    • Hypertension Paternal Grandfather    • Heart attack Paternal Grandfather      Past Medical History:   Diagnosis Date   • Acid reflux     controlled on protonix 40mg daily, EGD 2017 with ulcer, schlatzis ring   • Arthritis     CRP/ sed rate elevated, sees rheumatologist, questionable AI disease   • CKD (chronic kidney disease)    • Fatigue    • GERD (gastroesophageal reflux disease)    • Gout     has prednisone prn to take for flares, last 2019   • Hyperlipidemia    • Hypertension    • Hypocalcemia    • Hypoparathyroidism (CMS/[facility])     on cacium supplements and Vit D   • Lower extremity edema    • Morbid obesity with BMI of 45.0-49.9, adult (CMS/[facility])    • Obesity    • Renal calculi    • S/P rukhsana     gallstones   • Sleep apnea     CPAP compliant   • Type 2 diabetes mellitus (CMS/[facility])     dx 2001, on insulin since 1/2021, last A1C 6.8, sees endocrine       Patient Active Problem List   Diagnosis   • Arthritis   • GERD (gastroesophageal reflux disease)   • Gout   • Mixed hyperlipidemia   • Idiopathic hypoparathyroidism (CMS/[facility])   • Essential hypertension   • Uncontrolled type 2 diabetes mellitus with hyperglycemia (CMS/[facility])   • SANYA on CPAP   • Generalized edema   • Nodular goiter   • Lower extremity edema   • Fatigue   • Morbid obesity with BMI of 45.0-49.9, adult (CMS/[facility])   • Type 2 diabetes mellitus (CMS/[facility])   • Sleep apnea   • S/P rukhsana   • Hypocalcemia   • CKD (chronic kidney disease)   • Acid reflux   • Preoperative clearance         Social History     Tobacco Use   • Smoking status: Never Smoker   • Smokeless tobacco: Never Used   Vaping Use   • Vaping Use: Never used   Substance Use Topics   • Alcohol use: Never   • Drug  use: Never         The following portions of the patient's history were reviewed and updated as appropriate: allergies, current medications, past family history, past medical history, past social history, past surgical history and problem list.    Allergies   Allergen Reactions   • Penicillins Rash   • Rocephin [Ceftriaxone] Rash         Current Outpatient Medications:   •  allopurinol (ZYLOPRIM) 300 MG tablet, Take  by mouth., Disp: , Rfl:   •  aspirin 81 MG EC tablet, Take 81 mg by mouth Daily., Disp: , Rfl:   •  Blood Glucose Monitoring Suppl (True Metrix Meter) device, 1 Device Take As Directed., Disp: 1 Device, Rfl: 0  •  calcitriol (ROCALTROL) 0.5 MCG capsule, Take 1 capsule by mouth Daily., Disp: 90 capsule, Rfl: 3  •  Calcium Carb-Cholecalciferol (CALCIUM 600 + D) 600-200 MG-UNIT tablet, Take 600 mg by mouth 4 (Four) Times a Day., Disp: , Rfl:   •  Calcium-Vitamin D (CALTRATE 600 PLUS-VIT D PO), Take 1 tablet by mouth 4 (four) times a day., Disp: , Rfl:   •  Continuous Blood Gluc Sensor (Dexcom G6 Sensor), Every 10 (Ten) Days., Disp: 3 each, Rfl: 11  •  Continuous Blood Gluc Transmit (Dexcom G6 Transmitter) misc, 1 each Every 3 (Three) Months., Disp: 1 each, Rfl: 4  •  Empagliflozin 25 MG tablet, Take 25 mg by mouth Every Morning., Disp: 30 tablet, Rfl: 11  •  fenofibrate (TRICOR) 145 MG tablet, TAKE ONE TABLET BY MOUTH EVERY DAY, Disp: 90 tablet, Rfl: 3  •  hydroCHLOROthiazide (HYDRODIURIL) 25 MG tablet, Take 1 tablet by mouth Daily., Disp: 90 tablet, Rfl: 3  •  Insulin Lispro, 1 Unit Dial, (HumaLOG KwikPen) 100 UNIT/ML solution pen-injector, Inject 26 Units under the skin into the appropriate area as directed 3 (Three) Times a Day Before Meals., Disp: 75 mL, Rfl: 3  •  Insulin Pen Needle (B-D ULTRAFINE III SHORT PEN) 31G X 8 MM misc, 1 each by Other route 5 (Five) Times a Day., Disp: 200 each, Rfl: 5  •  Lancets 30G misc, True Metrix Test 5 times dailt, Disp: 360 each, Rfl: 3  •  lisinopril  "(PRINIVIL,ZESTRIL) 40 MG tablet, Take 20 mg by mouth Daily., Disp: , Rfl:   •  loratadine (CLARITIN) 10 MG tablet, Take 10 mg by mouth Daily., Disp: , Rfl:   •  metFORMIN (GLUCOPHAGE) 1000 MG tablet, Take 1 tablet by mouth 2 (Two) Times a Day With Meals., Disp: 180 tablet, Rfl: 3  •  metoprolol succinate XL (TOPROL-XL) 25 MG 24 hr tablet, TAKE ONE TABLET BY MOUTH EVERY DAY, Disp: 90 tablet, Rfl: 3  •  pantoprazole (PROTONIX) 40 MG EC tablet, Take  by mouth., Disp: , Rfl:   •  pravastatin (PRAVACHOL) 40 MG tablet, Take 1 tablet by mouth Daily., Disp: 90 tablet, Rfl: 3  •  Semaglutide (Rybelsus) 7 MG tablet, Take 1 tablet by mouth Every Morning., Disp: 30 tablet, Rfl: 11  •  True Metrix Blood Glucose Test test strip, 1 each by Other route 5 (Five) Times a Day. Use as instructed, Disp: 150 each, Rfl: 5  •  vitamin D (ERGOCALCIFEROL) 1.25 MG (08745 UT) capsule capsule, Take 1 capsule by mouth 1 (One) Time Per Week for 10 doses., Disp: 10 capsule, Rfl: 0    Review of Systems   Constitutional: Negative.   HENT: Negative.  Negative for congestion.    Eyes: Negative.    Cardiovascular: Negative.  Negative for chest pain, cyanosis, dyspnea on exertion, irregular heartbeat, leg swelling, near-syncope, orthopnea, palpitations, paroxysmal nocturnal dyspnea and syncope.   Respiratory: Positive for shortness of breath.    Hematologic/Lymphatic: Negative.    Musculoskeletal: Negative.    Gastrointestinal: Negative.    Neurological: Negative.  Negative for headaches.        Objective      BP 96/60 (BP Location: Left arm, Patient Position: Sitting, Cuff Size: Large Adult)   Pulse 90   Temp 98.2 °F (36.8 °C)   Ht 162.6 cm (64\")   Wt 120 kg (265 lb)   SpO2 99%   BMI 45.49 kg/m²     Pulmonary:      Effort: Pulmonary effort is normal.      Breath sounds: Normal breath sounds. No stridor. No wheezing. No rhonchi. No rales.   Cardiovascular:      PMI at left midclavicular line. Normal rate. Regular rhythm. Normal S1. Normal S2.      " Murmurs: There is no murmur.      No gallop. No click. No rub.   Pulses:     Intact distal pulses.   Edema:     Peripheral edema absent.         Lab Review:    Lab Results   Component Value Date     03/29/2021    K 4.3 03/29/2021    CL 99 03/29/2021    BUN 24 (H) 03/29/2021    CREATININE 1.07 (H) 03/29/2021     (H) 01/11/2019    GLUCOSE 170 (H) 03/29/2021    CALCIUM 8.3 (L) 03/29/2021    ALT 11 03/29/2021    ALKPHOS 63 03/29/2021    LABIL2 1.8 01/11/2019     No results found for: CKTOTAL  Lab Results   Component Value Date    WBC 11.38 (H) 03/29/2021    HGB 12.7 03/29/2021    HCT 38.5 03/29/2021     03/29/2021     No results found for: INR  No results found for: MG  Lab Results   Component Value Date    CHOL 213 (H) 03/29/2021    CHLPL 175 01/11/2019    TRIG 1,130 (H) 03/29/2021    HDL 29 (L) 03/29/2021    VLDL  03/29/2021      Comment:      Unable to calculate    LDLHDL  03/29/2021      Comment:      Unable to calculate     No results found for: BNP      ECG 12 Lead    Date/Time: 5/6/2021 2:01 PM  Performed by: Elizabeth Riggs MD  Authorized by: Elizabeth Riggs MD   Comparison: compared with previous ECG from 8/17/2016  Similar to previous ECG  Rhythm: sinus rhythm  Rate: normal  BPM: 90  Conduction: conduction normal  ST Segments: ST segments normal  T Waves: T waves normal  QRS axis: normal  Other: no other findings    Clinical impression: normal ECG            I reviewed the patient's new clinical results.  I personally viewed and interpreted the patient's EKG/lab data        Assessment:   Diagnosis Plan   1. Preoperative clearance  Adult Transthoracic Echo Complete W/ Cont if Necessary Per Protocol    Treadmill Stress Test   2. Mixed hyperlipidemia     3. Essential hypertension     4. SANYA on CPAP     5. Morbid obesity with BMI of 45.0-49.9, adult (CMS/HCC)     6. SOB (shortness of breath)  Adult Transthoracic Echo Complete W/ Cont if Necessary Per Protocol           Plan:  Patient is here for preop cardiac clearance.  She is going to have gastric sleeve surgery for weight loss.      Patient has multiple comorbid conditions.  Is morbidly obese with BMI of 45.19.    He also has essential hypertension but is very well controlled with current medications actually is running low patient was advised to decrease hydrochlorothiazide 12.5 daily particularly because of renal function abnormality also.      She complains of shortness of breath and dyspnea on exertion which is probably related to obesity  Will arrange a treadmill stress test and echocardiogram for further evaluation.  Patient will be reevaluated after studies have been completed.  Meanwhile she was advised to decrease hydrochlorothiazide 12.5 mg daily.    Thank you for giving me the oppertunity to participate in your patient's cardiac care.    Sincerely,    JOS Riggs M.D. FACP FAC     No follow-ups on file.

## 2021-05-07 LAB — UREA BREATH TEST QL: NEGATIVE

## 2021-05-11 ENCOUNTER — HOSPITAL ENCOUNTER (OUTPATIENT)
Dept: NUCLEAR MEDICINE | Facility: HOSPITAL | Age: 41
Discharge: HOME | End: 2021-05-11

## 2021-05-11 DIAGNOSIS — E20.0 IDIOPATHIC HYPOPARATHYROIDISM (HCC): ICD-10-CM

## 2021-05-11 DIAGNOSIS — R10.13 DYSPEPSIA: ICD-10-CM

## 2021-05-11 PROCEDURE — 0 TECHNETIUM SULFUR COLLOID: Performed by: PHYSICIAN ASSISTANT

## 2021-05-11 PROCEDURE — A9541 TC99M SULFUR COLLOID: HCPCS | Performed by: PHYSICIAN ASSISTANT

## 2021-05-11 PROCEDURE — 78264 GASTRIC EMPTYING IMG STUDY: CPT | Performed by: RADIOLOGY

## 2021-05-11 PROCEDURE — 78264 GASTRIC EMPTYING IMG STUDY: CPT

## 2021-05-11 RX ORDER — CALCITRIOL 0.25 UG/1
CAPSULE, LIQUID FILLED ORAL
Qty: 60 CAPSULE | Refills: 11 | Status: SHIPPED | OUTPATIENT
Start: 2021-05-11 | End: 2022-05-24

## 2021-05-11 RX ADMIN — TECHNETIUM TC 99M SULFUR COLLOID 1 DOSE: KIT at 08:45

## 2021-05-21 ENCOUNTER — HOSPITAL ENCOUNTER (OUTPATIENT)
Dept: CARDIOLOGY | Facility: HOSPITAL | Age: 41
Discharge: HOME | End: 2021-05-21

## 2021-05-21 DIAGNOSIS — Z01.818 PREOPERATIVE CLEARANCE: ICD-10-CM

## 2021-05-21 DIAGNOSIS — R06.02 SOB (SHORTNESS OF BREATH): ICD-10-CM

## 2021-05-21 LAB
BH CV ECHO MEAS - % IVS THICK: 36.9 %
BH CV ECHO MEAS - % LVPW THICK: 20.2 %
BH CV ECHO MEAS - ACS: 1.7 CM
BH CV ECHO MEAS - AO MAX PG: 12.7 MMHG
BH CV ECHO MEAS - AO MEAN PG: 8 MMHG
BH CV ECHO MEAS - AO ROOT AREA (BSA CORRECTED): 1.3
BH CV ECHO MEAS - AO ROOT AREA: 6.4 CM^2
BH CV ECHO MEAS - AO ROOT DIAM: 2.9 CM
BH CV ECHO MEAS - AO V2 MAX: 178 CM/SEC
BH CV ECHO MEAS - AO V2 MEAN: 132 CM/SEC
BH CV ECHO MEAS - AO V2 VTI: 41.2 CM
BH CV ECHO MEAS - BSA(HAYCOCK): 2.4 M^2
BH CV ECHO MEAS - BSA: 2.2 M^2
BH CV ECHO MEAS - BZI_BMI: 45.5 KILOGRAMS/M^2
BH CV ECHO MEAS - BZI_METRIC_HEIGHT: 162.6 CM
BH CV ECHO MEAS - BZI_METRIC_WEIGHT: 120.2 KG
BH CV ECHO MEAS - EDV(CUBED): 145.1 ML
BH CV ECHO MEAS - EDV(MOD-SP4): 50.1 ML
BH CV ECHO MEAS - EDV(TEICH): 132.7 ML
BH CV ECHO MEAS - EF(CUBED): 86 %
BH CV ECHO MEAS - EF(MOD-SP4): 59.9 %
BH CV ECHO MEAS - EF(TEICH): 79.1 %
BH CV ECHO MEAS - ESV(CUBED): 20.3 ML
BH CV ECHO MEAS - ESV(MOD-SP4): 20.1 ML
BH CV ECHO MEAS - ESV(TEICH): 27.8 ML
BH CV ECHO MEAS - FS: 48 %
BH CV ECHO MEAS - IVS/LVPW: 0.95
BH CV ECHO MEAS - IVSD: 1.2 CM
BH CV ECHO MEAS - IVSS: 1.7 CM
BH CV ECHO MEAS - LA DIMENSION: 3.4 CM
BH CV ECHO MEAS - LA/AO: 1.2
BH CV ECHO MEAS - LV DIASTOLIC VOL/BSA (35-75): 22.7 ML/M^2
BH CV ECHO MEAS - LV MASS(C)D: 263.4 GRAMS
BH CV ECHO MEAS - LV MASS(C)DI: 119.5 GRAMS/M^2
BH CV ECHO MEAS - LV MASS(C)S: 154.5 GRAMS
BH CV ECHO MEAS - LV MASS(C)SI: 70.1 GRAMS/M^2
BH CV ECHO MEAS - LV SYSTOLIC VOL/BSA (12-30): 9.1 ML/M^2
BH CV ECHO MEAS - LVIDD: 5.3 CM
BH CV ECHO MEAS - LVIDS: 2.7 CM
BH CV ECHO MEAS - LVLD AP4: 7.7 CM
BH CV ECHO MEAS - LVLS AP4: 6.9 CM
BH CV ECHO MEAS - LVOT AREA (M): 2.8 CM^2
BH CV ECHO MEAS - LVOT AREA: 2.8 CM^2
BH CV ECHO MEAS - LVOT DIAM: 1.9 CM
BH CV ECHO MEAS - LVPWD: 1.3 CM
BH CV ECHO MEAS - LVPWS: 1.5 CM
BH CV ECHO MEAS - MV A MAX VEL: 106 CM/SEC
BH CV ECHO MEAS - MV E MAX VEL: 110 CM/SEC
BH CV ECHO MEAS - MV E/A: 1
BH CV ECHO MEAS - PA ACC TIME: 0.09 SEC
BH CV ECHO MEAS - PA PR(ACCEL): 37.6 MMHG
BH CV ECHO MEAS - RAP SYSTOLE: 10 MMHG
BH CV ECHO MEAS - RVSP: 28.1 MMHG
BH CV ECHO MEAS - SI(AO): 119.2 ML/M^2
BH CV ECHO MEAS - SI(CUBED): 56.6 ML/M^2
BH CV ECHO MEAS - SI(MOD-SP4): 13.6 ML/M^2
BH CV ECHO MEAS - SI(TEICH): 47.6 ML/M^2
BH CV ECHO MEAS - SV(AO): 262.8 ML
BH CV ECHO MEAS - SV(CUBED): 124.8 ML
BH CV ECHO MEAS - SV(MOD-SP4): 30 ML
BH CV ECHO MEAS - SV(TEICH): 104.9 ML
BH CV ECHO MEAS - TR MAX VEL: 213 CM/SEC
MAXIMAL PREDICTED HEART RATE: 179 BPM
STRESS TARGET HR: 152 BPM

## 2021-05-21 PROCEDURE — 93306 TTE W/DOPPLER COMPLETE: CPT

## 2021-05-21 PROCEDURE — 93018 CV STRESS TEST I&R ONLY: CPT | Performed by: INTERNAL MEDICINE

## 2021-05-21 PROCEDURE — 93017 CV STRESS TEST TRACING ONLY: CPT

## 2021-05-21 PROCEDURE — 93306 TTE W/DOPPLER COMPLETE: CPT | Performed by: INTERNAL MEDICINE

## 2021-05-24 LAB
BH CV STRESS BP STAGE 1: NORMAL
BH CV STRESS BP STAGE 2: NORMAL
BH CV STRESS DURATION MIN STAGE 1: 3
BH CV STRESS DURATION MIN STAGE 2: 3
BH CV STRESS DURATION SEC STAGE 1: 0
BH CV STRESS DURATION SEC STAGE 2: 0
BH CV STRESS GRADE STAGE 1: 10
BH CV STRESS GRADE STAGE 2: 12
BH CV STRESS HR STAGE 1: 135
BH CV STRESS HR STAGE 2: 158
BH CV STRESS METS STAGE 1: 5
BH CV STRESS METS STAGE 2: 7.5
BH CV STRESS PROTOCOL 1: NORMAL
BH CV STRESS RECOVERY BP: NORMAL MMHG
BH CV STRESS RECOVERY HR: 105 BPM
BH CV STRESS SPEED STAGE 1: 1.7
BH CV STRESS SPEED STAGE 2: 2.5
BH CV STRESS STAGE 1: 1
BH CV STRESS STAGE 2: 2
MAXIMAL PREDICTED HEART RATE: 179 BPM
PERCENT MAX PREDICTED HR: 89.39 %
STRESS BASELINE BP: NORMAL MMHG
STRESS BASELINE HR: 89 BPM
STRESS PERCENT HR: 105 %
STRESS POST ESTIMATED WORKLOAD: 7 METS
STRESS POST EXERCISE DUR MIN: 5 MIN
STRESS POST EXERCISE DUR SEC: 45 SEC
STRESS POST PEAK BP: NORMAL MMHG
STRESS POST PEAK HR: 160 BPM
STRESS TARGET HR: 152 BPM

## 2021-06-04 ENCOUNTER — OFFICE VISIT (OUTPATIENT)
Dept: BARIATRICS/WEIGHT MGMT | Facility: CLINIC | Age: 41
End: 2021-06-04

## 2021-06-04 VITALS
OXYGEN SATURATION: 98 % | BODY MASS INDEX: 44.56 KG/M2 | DIASTOLIC BLOOD PRESSURE: 66 MMHG | TEMPERATURE: 98.4 F | RESPIRATION RATE: 18 BRPM | HEART RATE: 78 BPM | WEIGHT: 261 LBS | SYSTOLIC BLOOD PRESSURE: 112 MMHG | HEIGHT: 64 IN

## 2021-06-04 DIAGNOSIS — K21.9 GASTROESOPHAGEAL REFLUX DISEASE, UNSPECIFIED WHETHER ESOPHAGITIS PRESENT: Primary | ICD-10-CM

## 2021-06-04 PROCEDURE — 99214 OFFICE O/P EST MOD 30 MIN: CPT | Performed by: PHYSICIAN ASSISTANT

## 2021-06-04 NOTE — PROGRESS NOTES
"[facility] BARIATRIC SURGERY  [address]    [facility] [address] [phone]      Patient  Name:  Natty Fuentes  :  1980        Chief Complaint:  GERD    History of Present Illness:  Natty Fuentes is a 40 y.o. female pursuing sleeve gastrectomy with Dr. Salgado.     Initial Intake Eval 21 - \"Natty has been overweight for at least 30 years, has been 35 pounds or more overweight for at least 20 years, has been 100 pounds or more overweight for 15 or more years and started dieting at age 15.      Previous diet attempts include: Low Carbohydrate, Nathalie's Diet, [place] and Slim Fast; Weight Watchers; Dexatrim and Ionamin/Adipex.  The most weight Natty lost was 25 pounds on Weight watchers but was only able to maintain that weight loss for 6 months.  Her maximum lifetime weight is 280 pounds.    As above, patient has been overweight for many years, with numerous failed dietary/weight loss attempts.  She now has obesity related comorbidities and as such has decided to pursue weight loss surgery.    H/o HTN, HLD, LE edema, IDDM dx , on insulin since 2021, last A1C 6.8, sees endocrine, sleep apnea CPAP compliant. H/o renal calculi, hypoparathyroidism on Ca and D supplements, CKD. Arthritis/ gout- CRP/ sed rate elevated, sees rheumatologist, questionable AI disease, has prednisone prn to take for flares, last 2019.     GI: chronic reflux controlled on protonix 40mg daily, EGD  with reported ulcer, schlatzis ring. S/p rukhsana with gallstones. No other GI complaints.\"    Since LOV, further eval includes:     H.Pylori IgA (+), but UBT (holding Protonix) was negative.  No hx H.Pylori noted o/w.      GES [facility] @[facility] revealed abnormal/delayed stomach emptying w/ 84% retention @ 120 minutes.      Past Medical History:   Diagnosis Date   • Acid reflux     controlled on protonix 40mg daily, EGD  with ulcer, schlatzis ring   • Arthritis     CRP/ sed rate " elevated, sees rheumatologist, questionable AI disease   • CKD (chronic kidney disease)    • Fatigue    • GERD (gastroesophageal reflux disease)    • Gout     has prednisone prn to take for flares, last 2019   • Hyperlipidemia    • Hypertension    • Hypocalcemia    • Hypoparathyroidism (CMS/[facility])     on cacium supplements and Vit D   • Lower extremity edema    • Morbid obesity with BMI of 45.0-49.9, adult (CMS/[facility])    • Obesity    • Renal calculi    • S/P rukhsana     gallstones   • Sleep apnea     CPAP compliant   • Type 2 diabetes mellitus (CMS/[facility])     dx 2001, on insulin since 1/2021, last A1C 6.8, sees endocrine     Past Surgical History:   Procedure Laterality Date   • LAPAROSCOPIC CHOLECYSTECTOMY  2006    gallstones   • OTHER SURGICAL HISTORY      Elbow surgery       Allergies   Allergen Reactions   • Penicillins Rash   • Rocephin [Ceftriaxone] Rash       Current Outpatient Medications:   •  allopurinol (ZYLOPRIM) 300 MG tablet, Take  by mouth., Disp: , Rfl:   •  aspirin 81 MG EC tablet, Take 81 mg by mouth Daily., Disp: , Rfl:   •  Blood Glucose Monitoring Suppl (True Metrix Meter) device, 1 Device Take As Directed., Disp: 1 Device, Rfl: 0  •  calcitriol (ROCALTROL) 0.25 MCG capsule, TAKE 2 CAPSULES BY MOUTH EVERY DAY, Disp: 60 capsule, Rfl: 11  •  Calcium Carb-Cholecalciferol (CALCIUM 600 + D) 600-200 MG-UNIT tablet, Take 600 mg by mouth 4 (Four) Times a Day., Disp: , Rfl:   •  Calcium-Vitamin D (CALTRATE 600 PLUS-VIT D PO), Take 1 tablet by mouth 4 (four) times a day., Disp: , Rfl:   •  Continuous Blood Gluc Sensor (Dexcom G6 Sensor), Every 10 (Ten) Days., Disp: 3 each, Rfl: 11  •  Continuous Blood Gluc Transmit (Dexcom G6 Transmitter) misc, 1 each Every 3 (Three) Months., Disp: 1 each, Rfl: 4  •  Empagliflozin 25 MG tablet, Take 25 mg by mouth Every Morning., Disp: 30 tablet, Rfl: 11  •  fenofibrate (TRICOR) 145 MG tablet, TAKE ONE TABLET BY MOUTH EVERY DAY, Disp: 90 tablet, Rfl: 3  •  Insulin Lispro, 1 Unit  Dial, (HumaLOG KwikPen) 100 UNIT/ML solution pen-injector, Inject 26 Units under the skin into the appropriate area as directed 3 (Three) Times a Day Before Meals., Disp: 75 mL, Rfl: 3  •  Insulin Pen Needle (B-D ULTRAFINE III SHORT PEN) 31G X 8 MM misc, 1 each by Other route 5 (Five) Times a Day., Disp: 200 each, Rfl: 5  •  Lancets 30G misc, True Metrix Test 5 times dailt, Disp: 360 each, Rfl: 3  •  lisinopril (PRINIVIL,ZESTRIL) 40 MG tablet, Take 20 mg by mouth Daily., Disp: , Rfl:   •  loratadine (CLARITIN) 10 MG tablet, Take 10 mg by mouth Daily., Disp: , Rfl:   •  metFORMIN (GLUCOPHAGE) 1000 MG tablet, Take 1 tablet by mouth 2 (Two) Times a Day With Meals., Disp: 180 tablet, Rfl: 3  •  metoprolol succinate XL (TOPROL-XL) 25 MG 24 hr tablet, TAKE ONE TABLET BY MOUTH EVERY DAY, Disp: 90 tablet, Rfl: 3  •  pantoprazole (PROTONIX) 40 MG EC tablet, Take  by mouth., Disp: , Rfl:   •  POTASSIUM CITRATE ER PO, Take 10 mEq by mouth Daily., Disp: , Rfl:   •  pravastatin (PRAVACHOL) 40 MG tablet, Take 1 tablet by mouth Daily., Disp: 90 tablet, Rfl: 3  •  Semaglutide (Rybelsus) 7 MG tablet, Take 1 tablet by mouth Every Morning., Disp: 30 tablet, Rfl: 11  •  True Metrix Blood Glucose Test test strip, 1 each by Other route 5 (Five) Times a Day. Use as instructed, Disp: 150 each, Rfl: 5  •  vitamin D (ERGOCALCIFEROL) 1.25 MG (45577 UT) capsule capsule, Take 1 capsule by mouth 1 (One) Time Per Week for 10 doses., Disp: 10 capsule, Rfl: 0    Social History     Socioeconomic History   • Marital status: Single     Spouse name: Not on file   • Number of children: Not on file   • Years of education: Not on file   • Highest education level: Not on file   Tobacco Use   • Smoking status: Never Smoker   • Smokeless tobacco: Never Used   Vaping Use   • Vaping Use: Never used   Substance and Sexual Activity   • Alcohol use: Never   • Drug use: Never   • Sexual activity: Not Currently     Social History     Social History Narrative    Pt  is a 40 yr old female, single with 0 children living in [place], [place]. Pt works full time at [employer].         Physical Exam:  Vital Signs:  Weight: 118 kg (261 lb)   Body mass index is 44.8 kg/m².  Temp: 98.4 °F (36.9 °C)   Heart Rate: 78   BP: 112/66     Physical Exam  Constitutional:       Appearance: She is well-developed.      Comments: wearing a mask   Eyes:      General: No scleral icterus.     Conjunctiva/sclera: Conjunctivae normal.   Cardiovascular:      Rate and Rhythm: Normal rate.   Pulmonary:      Effort: Pulmonary effort is normal.   Abdominal:      Palpations: Abdomen is soft.      Tenderness: There is no abdominal tenderness.   Musculoskeletal:         General: Normal range of motion.   Skin:     General: Skin is warm and dry.      Findings: No rash.   Neurological:      Mental Status: She is alert.   Psychiatric:         Behavior: Behavior is cooperative.         Judgment: Judgment normal.         Assessment:  Natty Fuentes is a 40 y.o. female with medically complicated obesity pursuing sleeve gastrectomy.      ICD-10-CM ICD-9-CM   1. Gastroesophageal reflux disease, unspecified whether esophagitis present  K21.9 530.81         Plan:  Will schedule EGD w/ Dr. Salgado for further eval.  Additional input to follow.      ANMOL Zhong

## 2021-06-06 NOTE — PROGRESS NOTES
"Justyna Fuentes is a 40 y.o. female who presents today for initial evaluation     Chief Complaint:  Evaluation for bariatric surgery clearance    History of Present Illness:   Here for initial evaluation.  She is here today for bariatric surgery clearance. She denies any history of significant anxiety or depression.   Born and raised in [place], [place], with both parents, 1 brother and 2 sister (1 is her twin).  She describes her childhood as good.  She states she got along well with both parents and siblings. 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 She done well in school without difficulty.  She is single, never been , and does not have any children. Denies any history of abuse, emotional, physical or sexual abuse.  Denies any significant depression or anxiety. Sleeping is good, getting about 6 -8 hours a night, denies NM. She has had weight problems her entire life. She has tried every diet there is, tried all medications, she would lose about 20 to 25 pounds, and then the weight loss would stall and she couldn't lose any more significant weight, she has history of thyroid disorder, polycystic ovarian disease and metabolic syndrome. Those have most likely hindered her weight loss.   She states as a child, she thought there was something wrong with her because she \"didn't look\" like the other children.  She has been overweight \"my entire life\".  Sometimes she has feelings of being  down and felt helpless to lose weight. Denies SI/HI/AVH.  Denies thoughts of self-harm.  Chronic health issues, no acute physical or medical issues today.  She denies any history of addiction, drug use or alcohol use or abuse.       The following portions of the patient's history were reviewed and updated as appropriate: allergies, current medications, past family history, past medical history, past social history, past surgical history and problem list.      Past Medical " History:  Past Medical History:   Diagnosis Date   • Acid reflux     controlled on protonix 40mg daily, EGD 2017 with ulcer, schlatzis ring   • Arthritis     CRP/ sed rate elevated, sees rheumatologist, questionable AI disease   • CKD (chronic kidney disease)    • Fatigue    • GERD (gastroesophageal reflux disease)    • Gout     has prednisone prn to take for flares, last 2019   • Hyperlipidemia    • Hypertension    • Hypocalcemia    • Hypoparathyroidism (CMS/[facility])     on cacium supplements and Vit D   • Lower extremity edema    • Morbid obesity with BMI of 45.0-49.9, adult (CMS/[facility])    • Obesity    • Renal calculi    • S/P rukhsana     gallstones   • Sleep apnea     CPAP compliant   • Type 2 diabetes mellitus (CMS/[facility])     dx 2001, on insulin since 1/2021, last A1C 6.8, sees endocrine       Social History:  Social History     Socioeconomic History   • Marital status: Single     Spouse name: Not on file   • Number of children: Not on file   • Years of education: Not on file   • Highest education level: Not on file   Tobacco Use   • Smoking status: Never Smoker   • Smokeless tobacco: Never Used   Vaping Use   • Vaping Use: Never used   Substance and Sexual Activity   • Alcohol use: Never   • Drug use: Never   • Sexual activity: Not Currently       Family History:  Family History   Problem Relation Age of Onset   • Arthritis Other    • Colon cancer Other    • Hyperlipidemia Other    • Hypertension Other    • Obesity Other    • Diabetes Other    • Kidney disease Other    • Thyroid disease Other    • Stroke Other    • Hypertension Mother    • Hyperlipidemia Mother    • Lupus Mother    • Cancer Mother    • Pulmonary embolism Mother    • Obesity Mother    • Diabetes Mother    • Diabetes Father    • Kidney failure Father    • Hypertension Father    • Hyperlipidemia Father    • Obesity Father    • Sleep apnea Father    • Hyperlipidemia Sister    • Hypertension Sister    • Diabetes Sister    • Pulmonary embolism Sister    •  Obesity Sister    • Sleep apnea Sister    • Hypertension Brother    • Hyperlipidemia Brother    • Diabetes Brother    • Obesity Brother    • Sleep apnea Brother    • Pulmonary embolism Sister    • Obesity Maternal Grandmother    • Diabetes Maternal Grandmother    • Hypertension Maternal Grandmother    • Pulmonary embolism Maternal Grandmother    • Hypertension Maternal Grandfather    • Pulmonary embolism Maternal Grandfather    • Obesity Paternal Grandmother    • Diabetes Paternal Grandmother    • Hypertension Paternal Grandmother    • Sleep apnea Paternal Grandmother    • Obesity Paternal Grandfather    • Hypertension Paternal Grandfather    • Heart attack Paternal Grandfather        Past Surgical History:  Past Surgical History:   Procedure Laterality Date   • LAPAROSCOPIC CHOLECYSTECTOMY  2006    gallstones   • OTHER SURGICAL HISTORY      Elbow surgery       Problem List:  Patient Active Problem List   Diagnosis   • Arthritis   • GERD (gastroesophageal reflux disease)   • Gout   • Mixed hyperlipidemia   • Idiopathic hypoparathyroidism (CMS/HCC)   • Essential hypertension   • Uncontrolled type 2 diabetes mellitus with hyperglycemia (CMS/HCC)   • SANYA on CPAP   • Generalized edema   • Nodular goiter   • Lower extremity edema   • Fatigue   • Morbid obesity with BMI of 45.0-49.9, adult (CMS/[facility])   • Type 2 diabetes mellitus (CMS/HCC)   • Sleep apnea   • S/P rukhsana   • Hypocalcemia   • CKD (chronic kidney disease)   • Acid reflux   • Preoperative clearance       Allergy:   Allergies   Allergen Reactions   • Penicillins Rash   • Rocephin [Ceftriaxone] Rash        Current Medications:   Current Outpatient Medications   Medication Sig Dispense Refill   • allopurinol (ZYLOPRIM) 300 MG tablet Take  by mouth.     • aspirin 81 MG EC tablet Take 81 mg by mouth Daily.     • Blood Glucose Monitoring Suppl (True Metrix Meter) device 1 Device Take As Directed. 1 Device 0   • calcitriol (ROCALTROL) 0.25 MCG capsule TAKE 2 CAPSULES  BY MOUTH EVERY DAY 60 capsule 11   • Calcium Carb-Cholecalciferol (CALCIUM 600 + D) 600-200 MG-UNIT tablet Take 600 mg by mouth 4 (Four) Times a Day.     • Calcium-Vitamin D (CALTRATE 600 PLUS-VIT D PO) Take 1 tablet by mouth 4 (four) times a day.     • Continuous Blood Gluc Sensor (Dexcom G6 Sensor) Every 10 (Ten) Days. 3 each 11   • Continuous Blood Gluc Transmit (Dexcom G6 Transmitter) misc 1 each Every 3 (Three) Months. 1 each 4   • Empagliflozin 25 MG tablet Take 25 mg by mouth Every Morning. 30 tablet 11   • fenofibrate (TRICOR) 145 MG tablet TAKE ONE TABLET BY MOUTH EVERY DAY 90 tablet 3   • Insulin Lispro, 1 Unit Dial, (HumaLOG KwikPen) 100 UNIT/ML solution pen-injector Inject 26 Units under the skin into the appropriate area as directed 3 (Three) Times a Day Before Meals. 75 mL 3   • Insulin Pen Needle (B-D ULTRAFINE III SHORT PEN) 31G X 8 MM misc 1 each by Other route 5 (Five) Times a Day. 200 each 5   • Lancets 30G misc True Metrix Test 5 times dailt 360 each 3   • lisinopril (PRINIVIL,ZESTRIL) 40 MG tablet Take 20 mg by mouth Daily.     • loratadine (CLARITIN) 10 MG tablet Take 10 mg by mouth Daily.     • metFORMIN (GLUCOPHAGE) 1000 MG tablet Take 1 tablet by mouth 2 (Two) Times a Day With Meals. 180 tablet 3   • metoprolol succinate XL (TOPROL-XL) 25 MG 24 hr tablet TAKE ONE TABLET BY MOUTH EVERY DAY 90 tablet 3   • pantoprazole (PROTONIX) 40 MG EC tablet Take  by mouth.     • potassium chloride 10 MEQ CR tablet      • POTASSIUM CITRATE ER PO Take 10 mEq by mouth Daily.     • pravastatin (PRAVACHOL) 40 MG tablet Take 1 tablet by mouth Daily. 90 tablet 3   • Semaglutide (Rybelsus) 7 MG tablet Take 1 tablet by mouth Every Morning. 30 tablet 11   • True Metrix Blood Glucose Test test strip 1 each by Other route 5 (Five) Times a Day. Use as instructed 150 each 5   • vitamin D (ERGOCALCIFEROL) 1.25 MG (49359 UT) capsule capsule Take 1 capsule by mouth 1 (One) Time Per Week for 10 doses. 10 capsule 0     No  "current facility-administered medications for this visit.       Review of Symptoms:    Review of Systems   Constitutional: Negative.    HENT: Negative.    Eyes: Negative.    Respiratory: Negative.    Cardiovascular: Negative.    Neurological: Negative.    Psychiatric/Behavioral: Negative.  Positive for depressed mood (History).       Objective   Physical Exam:   Blood pressure 140/74, pulse 89, height 162.6 cm (64.02\"), weight 122 kg (268 lb), not currently breastfeeding.  Body mass index is 45.98 kg/m².    Appearance:  efmale appears stated age, no acute distress noted.    Gait, Station, Strength: Steady, posture erect, WNL      Mental Status Exam:   Hygiene:   good  Cooperation:  Cooperative  Eye Contact:  Good  Psychomotor Behavior:  Appropriate  Affect:  Appropriate  Mood: normal  Hopelessness: Denies  Speech:  Normal  Thought Process:  Goal directed and Linear  Thought Content:  Normal  Suicidal:  None  Homicidal:  None  Hallucinations:  None  Delusion:  None  Memory:  Intact  Orientation:  Person, Place, Time and Situation  Reliability:  good  Insight:  Good  Judgement:  Good  Impulse Control:  Good  Physical/Medical Issues:  Yes HTN, DIABETES, GERD, THYROID DISORDER, METABOLIC SYNDROME     PHQ-Score Total:  PHQ-9 Total Score:  0  JESSIE-7 Total Score 0    Lab Results:   Office Visit on 06/07/2021   Component Date Value Ref Range Status   • External Amphetamine Screen Urine 06/07/2021 Negative   Final   • Amphetamine Cut-Off 06/07/2021 1000NG/ML   Final   • External Benzodiazepine Screen Uri* 06/07/2021 Negative   Final   • Benzodiazipine Cut-Off 06/07/2021 300NG/ML   Final   • External Cocaine Screen Urine 06/07/2021 Negative   Final   • Cocaine Cut-Off 06/07/2021 300NG/ML   Final   • External THC Screen Urine 06/07/2021 Negative   Final   • THC Cut-Off 06/07/2021 50NG/ML   Final   • External Methadone Screen Urine 06/07/2021 Negative   Final   • Methadone Cut-Off 06/07/2021 300NG/ML   Final   • " External Methamphetamine Screen Ur* 06/07/2021 Negative   Final   • Methamphetamine Cut-Off 06/07/2021 1000NG/ML   Final   • External Oxycodone Screen Urine 06/07/2021 Negative   Final   • Oxycodone Cut-Off 06/07/2021 100NG/ML   Final   • External Buprenorphine Screen Urine 06/07/2021 Negative   Final   • Buprenorphine Cut-Off 06/07/2021 10NG/ML   Final   • External MDMA 06/07/2021 Negative   Final   • MDMA Cut-Off 06/07/2021 500NG/ML   Final   • External Opiates Screen Urine 06/07/2021 Negative   Final   • Opiates Cut-Off 06/07/2021 300NG/ML   Final   Hospital Outpatient Visit on 05/21/2021   Component Date Value Ref Range Status   • BSA 05/21/2021 2.2  m^2 Final   • IVSd 05/21/2021 1.2  cm Final   • IVSs 05/21/2021 1.7  cm Final   • LVIDd 05/21/2021 5.3  cm Final   • LVIDs 05/21/2021 2.7  cm Final   • LVPWd 05/21/2021 1.3  cm Final   • BH CV ECHO TERRI - LVPWS 05/21/2021 1.5  cm Final   • IVS/LVPW 05/21/2021 0.95   Final   • FS 05/21/2021 48.0  % Final   • EDV(Teich) 05/21/2021 132.7  ml Final   • ESV(Teich) 05/21/2021 27.8  ml Final   • EF(Teich) 05/21/2021 79.1  % Final   • EDV(cubed) 05/21/2021 145.1  ml Final   • ESV(cubed) 05/21/2021 20.3  ml Final   • EF(cubed) 05/21/2021 86.0  % Final   • % IVS thick 05/21/2021 36.9  % Final   • % LVPW thick 05/21/2021 20.2  % Final   • LV mass(C)d 05/21/2021 263.4  grams Final   • LV mass(C)dI 05/21/2021 119.5  grams/m^2 Final   • LV mass(C)s 05/21/2021 154.5  grams Final   • LV mass(C)sI 05/21/2021 70.1  grams/m^2 Final   • SV(Teich) 05/21/2021 104.9  ml Final   • SI(Teich) 05/21/2021 47.6  ml/m^2 Final   • SV(cubed) 05/21/2021 124.8  ml Final   • SI(cubed) 05/21/2021 56.6  ml/m^2 Final   • Ao root diam 05/21/2021 2.9  cm Final   • Ao root area 05/21/2021 6.4  cm^2 Final   • ACS 05/21/2021 1.7  cm Final   • LA dimension 05/21/2021 3.4  cm Final   • LA/Ao 05/21/2021 1.2   Final   • LVOT diam 05/21/2021 1.9  cm Final   • LVOT area 05/21/2021 2.8  cm^2 Final   • LVOT  area(traced) 05/21/2021 2.8  cm^2 Final   • LVLd ap4 05/21/2021 7.7  cm Final   • EDV(MOD-sp4) 05/21/2021 50.1  ml Final   • LVLs ap4 05/21/2021 6.9  cm Final   • ESV(MOD-sp4) 05/21/2021 20.1  ml Final   • EF(MOD-sp4) 05/21/2021 59.9  % Final   • SV(MOD-sp4) 05/21/2021 30.0  ml Final   • SI(MOD-sp4) 05/21/2021 13.6  ml/m^2 Final   • Ao root area (BSA corrected) 05/21/2021 1.3   Final   • LV Hirsch Vol (BSA corrected) 05/21/2021 22.7  ml/m^2 Final   • LV Sys Vol (BSA corrected) 05/21/2021 9.1  ml/m^2 Final   • MV E max ksenia 05/21/2021 110.0  cm/sec Final   • MV A max ksenia 05/21/2021 106.0  cm/sec Final   • MV E/A 05/21/2021 1.0   Final   • Ao pk ksenia 05/21/2021 178.0  cm/sec Final   • Ao max PG 05/21/2021 12.7  mmHg Final   • Ao V2 mean 05/21/2021 132.0  cm/sec Final   • Ao mean PG 05/21/2021 8.0  mmHg Final   • Ao V2 VTI 05/21/2021 41.2  cm Final   • SV(Ao) 05/21/2021 262.8  ml Final   • SI(Ao) 05/21/2021 119.2  ml/m^2 Final   • PA acc time 05/21/2021 0.09  sec Final   • TR max ksenia 05/21/2021 213.0  cm/sec Final   • RVSP(TR) 05/21/2021 28.1  mmHg Final   • RAP systole 05/21/2021 10.0  mmHg Final   • PA pr(Accel) 05/21/2021 37.6  mmHg Final   • BH CV ECHO TERRI - BZI_BMI 05/21/2021 45.5  kilograms/m^2 Final   • [facility] 05/21/2021 2.4  m^2 Final   • BH CV ECHO TERRI - BZI_METRIC_WEIGHT 05/21/2021 120.2  kg Final   • BH CV ECHO TERRI - BZI_METRIC_HEIGHT 05/21/2021 162.6  cm Final   • Target HR (85%) 05/21/2021 152  bpm Final   • Max. Pred. HR (100%) 05/21/2021 179  bpm Final   Hospital Outpatient Visit on 05/21/2021   Component Date Value Ref Range Status   • Target HR (85%) 05/21/2021 152  bpm Final   • Max. Pred. HR (100%) 05/21/2021 179  bpm Final   • BH CV STRESS PROTOCOL 1 05/21/2021 Noah   Final   • Stage 1 05/21/2021 1   Final   • Duration Min Stage 1 05/21/2021 3   Final   • Duration Sec Stage 1 05/21/2021 0   Final   • Grade Stage 1 05/21/2021 10   Final   • Speed Stage 1 05/21/2021 1.7   Final    • BH CV STRESS METS STAGE 1 05/21/2021 5   Final   • Stage 2 05/21/2021 2   Final   • Duration Min Stage 2 05/21/2021 3   Final   • Duration Sec Stage 2 05/21/2021 0   Final   • Grade Stage 2 05/21/2021 12   Final   • Speed Stage 2 05/21/2021 2.5   Final   • BH CV STRESS METS STAGE 2 05/21/2021 7.5   Final   • Peak HR 05/21/2021 160  bpm Final   • Percent Max Pred HR 05/21/2021 89.39  % Final   • Percent Target HR 05/21/2021 105  % Final   • Peak BP 05/21/2021 238/108  mmHg Final   • HR Stage 1 05/21/2021 135   Final   • BP Stage 1 05/21/2021 192/62   Final   • HR Stage 2 05/21/2021 158   Final   • BP Stage 2 05/21/2021 238/108   Final   • Baseline HR 05/21/2021 89  bpm Final   • Baseline BP 05/21/2021 150/82  mmHg Final   • Recovery HR 05/21/2021 105  bpm Final   • Recovery BP 05/21/2021 128/69  mmHg Final   • Exercise duration (min) 05/21/2021 5  min Final   • Exercise duration (sec) 05/21/2021 45  sec Final   • Estimated workload 05/21/2021 7.0  METS Final       Assessment/Plan   Problems Addressed this Visit     None      Visit Diagnoses     Mild depressive episode (CMS/HCC)    -  Primary    Medication management        Relevant Orders    [facility] Drug Screen (Completed)      Diagnoses       Codes Comments    Mild depressive episode (CMS/HCC)    -  Primary ICD-10-CM: F32.0  ICD-9-CM: 296.21     Medication management     ICD-10-CM: Z79.899  ICD-9-CM: V58.69           Social History     Tobacco Use   Smoking Status Never Smoker   Smokeless Tobacco Never Used     TANGELA reviewed and appropriate. Patient counseled on use of controlled substances.     -The benefits of a healthy diet and exercise were discussed with patient, especially the positive effects they have on mental health. Patient encouraged to consider lifestyle modification regarding  diet and exercise patterns to maximize results of mental health treatment.  -Reviewed previous available documentation  -Reviewed most recent available labs       Visit  Diagnoses:    ICD-10-CM ICD-9-CM   1. Mild depressive episode (CMS/HCC)  F32.0 296.21   2. Medication management  Z79.899 V58.69           MEDICATION ISSUES:  Patient is agreeable to call the office with any worsening of symptoms or onset of side effects. Patient is agreeable to call 911 or go to the nearest ER should he/she begin having SI/HI.     MEDS ORDERED DURING VISIT:  No orders of the defined types were placed in this encounter.  -I do not find significant anxiety and or depression that would contraindicate proceeding with bariatric surgery for weight loss.  She voices understanding that lifelong lifestyle changes to diet and exercise is required for long term weight loss success.   -Follow up prn.    Return if symptoms worsen or fail to improve.             This document has been electronically signed by SHARON Tan   June 7, 2021 13:25 EDT    Part of this note may be an electronic transcription/translation of spoken language to printed text using the Dragon Dictation System.

## 2021-06-07 ENCOUNTER — OFFICE VISIT (OUTPATIENT)
Dept: CARDIOLOGY | Facility: CLINIC | Age: 41
End: 2021-06-07

## 2021-06-07 ENCOUNTER — OFFICE VISIT (OUTPATIENT)
Dept: PSYCHIATRY | Facility: CLINIC | Age: 41
End: 2021-06-07

## 2021-06-07 VITALS
HEIGHT: 64 IN | DIASTOLIC BLOOD PRESSURE: 74 MMHG | HEART RATE: 89 BPM | WEIGHT: 268 LBS | BODY MASS INDEX: 45.75 KG/M2 | SYSTOLIC BLOOD PRESSURE: 140 MMHG

## 2021-06-07 VITALS
SYSTOLIC BLOOD PRESSURE: 124 MMHG | HEIGHT: 64 IN | DIASTOLIC BLOOD PRESSURE: 68 MMHG | BODY MASS INDEX: 45.34 KG/M2 | WEIGHT: 265.6 LBS | HEART RATE: 77 BPM | OXYGEN SATURATION: 98 % | TEMPERATURE: 95.2 F

## 2021-06-07 DIAGNOSIS — E78.2 MIXED HYPERLIPIDEMIA: ICD-10-CM

## 2021-06-07 DIAGNOSIS — Z01.818 PREOPERATIVE CLEARANCE: Primary | ICD-10-CM

## 2021-06-07 DIAGNOSIS — Z79.899 MEDICATION MANAGEMENT: ICD-10-CM

## 2021-06-07 DIAGNOSIS — I10 ESSENTIAL HYPERTENSION: ICD-10-CM

## 2021-06-07 DIAGNOSIS — E66.01 MORBID OBESITY WITH BMI OF 45.0-49.9, ADULT (HCC): ICD-10-CM

## 2021-06-07 DIAGNOSIS — F32.A MILD DEPRESSIVE EPISODE: Primary | ICD-10-CM

## 2021-06-07 LAB
AMPHETAMINE CUT-OFF: NORMAL
BENZODIAZIPINE CUT-OFF: NORMAL
BUPRENORPHINE CUT-OFF: NORMAL
COCAINE CUT-OFF: NORMAL
EXTERNAL AMPHETAMINE SCREEN URINE: NEGATIVE
EXTERNAL BENZODIAZEPINE SCREEN URINE: NEGATIVE
EXTERNAL BUPRENORPHINE SCREEN URINE: NEGATIVE
EXTERNAL COCAINE SCREEN URINE: NEGATIVE
EXTERNAL MDMA: NEGATIVE
EXTERNAL METHADONE SCREEN URINE: NEGATIVE
EXTERNAL METHAMPHETAMINE SCREEN URINE: NEGATIVE
EXTERNAL OPIATES SCREEN URINE: NEGATIVE
EXTERNAL OXYCODONE SCREEN URINE: NEGATIVE
EXTERNAL THC SCREEN URINE: NEGATIVE
MDMA CUT-OFF: NORMAL
METHADONE CUT-OFF: NORMAL
METHAMPHETAMINE CUT-OFF: NORMAL
OPIATES CUT-OFF: NORMAL
OXYCODONE CUT-OFF: NORMAL
THC CUT-OFF: NORMAL

## 2021-06-07 PROCEDURE — 99214 OFFICE O/P EST MOD 30 MIN: CPT | Performed by: INTERNAL MEDICINE

## 2021-06-07 PROCEDURE — 99214 OFFICE O/P EST MOD 30 MIN: CPT | Performed by: NURSE PRACTITIONER

## 2021-06-07 RX ORDER — POTASSIUM CHLORIDE 750 MG/1
TABLET, FILM COATED, EXTENDED RELEASE ORAL
COMMUNITY
Start: 2021-06-04 | End: 2021-07-06 | Stop reason: SDUPTHER

## 2021-06-07 NOTE — PROGRESS NOTES
subjective     Chief Complaint   Patient presents with   • Surgical Clearance     Gastric Sleeve   • Follow-up     Cardiac Tests     History of Present Illness  Patient is 40 years old white female who is here for cardiac clearance for gastric sleeve surgery.  9 she underwent cardiac work-up including an EKG, echo and stress test.  She is here for follow-up.    She has hyperlipidemia and has been taking Pravachol and TriCor.    He also has diabetes mellitus and has been taking empagliflozin 25 mg daily, Metformin and semaglutide    Blood pressure is controlled with lisinopril and metoprolol.  She states that on the day of stress test she did not take her blood pressure medications and blood pressure got quite high after stress test.    Past Surgical History:   Procedure Laterality Date   • LAPAROSCOPIC CHOLECYSTECTOMY  2006    gallstones   • OTHER SURGICAL HISTORY      Elbow surgery     Family History   Problem Relation Age of Onset   • Arthritis Other    • Colon cancer Other    • Hyperlipidemia Other    • Hypertension Other    • Obesity Other    • Diabetes Other    • Kidney disease Other    • Thyroid disease Other    • Stroke Other    • Hypertension Mother    • Hyperlipidemia Mother    • Lupus Mother    • Cancer Mother    • Pulmonary embolism Mother    • Obesity Mother    • Diabetes Mother    • Diabetes Father    • Kidney failure Father    • Hypertension Father    • Hyperlipidemia Father    • Obesity Father    • Sleep apnea Father    • Hyperlipidemia Sister    • Hypertension Sister    • Diabetes Sister    • Pulmonary embolism Sister    • Obesity Sister    • Sleep apnea Sister    • Hypertension Brother    • Hyperlipidemia Brother    • Diabetes Brother    • Obesity Brother    • Sleep apnea Brother    • Pulmonary embolism Sister    • Obesity Maternal Grandmother    • Diabetes Maternal Grandmother    • Hypertension Maternal Grandmother    • Pulmonary embolism Maternal Grandmother    • Hypertension Maternal Grandfather     • Pulmonary embolism Maternal Grandfather    • Obesity Paternal Grandmother    • Diabetes Paternal Grandmother    • Hypertension Paternal Grandmother    • Sleep apnea Paternal Grandmother    • Obesity Paternal Grandfather    • Hypertension Paternal Grandfather    • Heart attack Paternal Grandfather      Past Medical History:   Diagnosis Date   • Acid reflux     controlled on protonix 40mg daily, EGD 2017 with ulcer, schlatzis ring   • Arthritis     CRP/ sed rate elevated, sees rheumatologist, questionable AI disease   • CKD (chronic kidney disease)    • Fatigue    • GERD (gastroesophageal reflux disease)    • Gout     has prednisone prn to take for flares, last 2019   • Hyperlipidemia    • Hypertension    • Hypocalcemia    • Hypoparathyroidism (CMS/[facility])     on cacium supplements and Vit D   • Lower extremity edema    • Morbid obesity with BMI of 45.0-49.9, adult (CMS/[facility])    • Obesity    • Renal calculi    • S/P rukhsana     gallstones   • Sleep apnea     CPAP compliant   • Type 2 diabetes mellitus (CMS/[facility])     dx 2001, on insulin since 1/2021, last A1C 6.8, sees endocrine     Patient Active Problem List   Diagnosis   • Arthritis   • GERD (gastroesophageal reflux disease)   • Gout   • Mixed hyperlipidemia   • Idiopathic hypoparathyroidism (CMS/[facility])   • Essential hypertension   • Uncontrolled type 2 diabetes mellitus with hyperglycemia (CMS/[facility])   • SANYA on CPAP   • Generalized edema   • Nodular goiter   • Lower extremity edema   • Fatigue   • Morbid obesity with BMI of 45.0-49.9, adult (CMS/[facility])   • Type 2 diabetes mellitus (CMS/[facility])   • Sleep apnea   • S/P rukhsana   • Hypocalcemia   • CKD (chronic kidney disease)   • Acid reflux   • Preoperative clearance       Social History     Tobacco Use   • Smoking status: Never Smoker   • Smokeless tobacco: Never Used   Vaping Use   • Vaping Use: Never used   Substance Use Topics   • Alcohol use: Never   • Drug use: Never       Allergies   Allergen Reactions   • Penicillins Rash    • Rocephin [Ceftriaxone] Rash       Current Outpatient Medications on File Prior to Visit   Medication Sig   • allopurinol (ZYLOPRIM) 300 MG tablet Take  by mouth.   • aspirin 81 MG EC tablet Take 81 mg by mouth Daily.   • Blood Glucose Monitoring Suppl (True Metrix Meter) device 1 Device Take As Directed.   • calcitriol (ROCALTROL) 0.25 MCG capsule TAKE 2 CAPSULES BY MOUTH EVERY DAY   • Calcium Carb-Cholecalciferol (CALCIUM 600 + D) 600-200 MG-UNIT tablet Take 600 mg by mouth 4 (Four) Times a Day.   • Calcium-Vitamin D (CALTRATE 600 PLUS-VIT D PO) Take 1 tablet by mouth 4 (four) times a day.   • Continuous Blood Gluc Sensor (Dexcom G6 Sensor) Every 10 (Ten) Days.   • Continuous Blood Gluc Transmit (Dexcom G6 Transmitter) misc 1 each Every 3 (Three) Months.   • Empagliflozin 25 MG tablet Take 25 mg by mouth Every Morning.   • fenofibrate (TRICOR) 145 MG tablet TAKE ONE TABLET BY MOUTH EVERY DAY   • Insulin Lispro, 1 Unit Dial, (HumaLOG KwikPen) 100 UNIT/ML solution pen-injector Inject 26 Units under the skin into the appropriate area as directed 3 (Three) Times a Day Before Meals.   • Insulin Pen Needle (B-D ULTRAFINE III SHORT PEN) 31G X 8 MM misc 1 each by Other route 5 (Five) Times a Day.   • Lancets 30G misc True Metrix Test 5 times dailt   • lisinopril (PRINIVIL,ZESTRIL) 40 MG tablet Take 20 mg by mouth Daily.   • loratadine (CLARITIN) 10 MG tablet Take 10 mg by mouth Daily.   • metFORMIN (GLUCOPHAGE) 1000 MG tablet Take 1 tablet by mouth 2 (Two) Times a Day With Meals.   • metoprolol succinate XL (TOPROL-XL) 25 MG 24 hr tablet TAKE ONE TABLET BY MOUTH EVERY DAY   • pantoprazole (PROTONIX) 40 MG EC tablet Take  by mouth.   • potassium chloride 10 MEQ CR tablet    • POTASSIUM CITRATE ER PO Take 10 mEq by mouth Daily.   • pravastatin (PRAVACHOL) 40 MG tablet Take 1 tablet by mouth Daily.   • Semaglutide (Rybelsus) 7 MG tablet Take 1 tablet by mouth Every Morning.   • True Metrix Blood Glucose Test test strip 1  "each by Other route 5 (Five) Times a Day. Use as instructed   • vitamin D (ERGOCALCIFEROL) 1.25 MG (87047 UT) capsule capsule Take 1 capsule by mouth 1 (One) Time Per Week for 10 doses.     No current facility-administered medications on file prior to visit.         The following portions of the patient's history were reviewed and updated as appropriate: allergies, current medications, past family history, past medical history, past social history, past surgical history and problem list.    Review of Systems   Constitutional: Negative.   HENT: Negative.  Negative for congestion.    Eyes: Negative.    Cardiovascular: Negative.  Negative for chest pain, cyanosis, dyspnea on exertion, irregular heartbeat, leg swelling, near-syncope, orthopnea, palpitations, paroxysmal nocturnal dyspnea and syncope.   Respiratory: Negative.  Negative for shortness of breath.    Hematologic/Lymphatic: Negative.    Musculoskeletal: Negative.    Gastrointestinal: Negative.    Neurological: Negative.  Negative for headaches.          Objective:     /68 (BP Location: Left arm, Patient Position: Sitting, Cuff Size: Adult)   Pulse 77   Temp 95.2 °F (35.1 °C) (Infrared)   Ht 162.6 cm (64\")   Wt 120 kg (265 lb 9.6 oz)   SpO2 98%   BMI 45.59 kg/m²   Vitals and nursing note reviewed.   Pulmonary:      Effort: Pulmonary effort is normal.      Breath sounds: Normal breath sounds. No stridor. No wheezing. No rhonchi. No rales.   Cardiovascular:      PMI at left midclavicular line. Normal rate. Regular rhythm. Normal S1. Normal S2.      Murmurs: There is no murmur.      No gallop. No click. No rub.   Pulses:     Intact distal pulses.   Edema:     Peripheral edema absent.           Lab Review  Lab Results   Component Value Date     03/29/2021    K 4.3 03/29/2021    CL 99 03/29/2021    BUN 24 (H) 03/29/2021    CREATININE 1.07 (H) 03/29/2021    GLUCOSE 170 (H) 03/29/2021     (H) 01/11/2019    CALCIUM 8.3 (L) 03/29/2021    ALT 11 " 03/29/2021    ALKPHOS 63 03/29/2021    LABIL2 1.8 01/11/2019     No results found for: CKTOTAL  Lab Results   Component Value Date    WBC 11.38 (H) 03/29/2021    HGB 12.7 03/29/2021    HCT 38.5 03/29/2021     03/29/2021     No results found for: INR  No results found for: MG  Lab Results   Component Value Date    TSH 0.751 03/29/2021     No results found for: BNP  Lab Results   Component Value Date    CHLPL 175 01/11/2019    CHOL 213 (H) 03/29/2021    TRIG 1,130 (H) 03/29/2021    HDL 29 (L) 03/29/2021    VLDL  03/29/2021      Comment:      Unable to calculate    LDLHDL  03/29/2021      Comment:      Unable to calculate     Lab Results   Component Value Date    LDL  03/29/2021      Comment:      Unable to calculate    LDL 86 03/29/2021       Procedures  Interpretation Summary  Treadmill stress test  · A stress test was performed following the Noah protocol.  · Baseline ECG of normal sinus rhythm noted. Normal baseline ECG  · Patient exercised for 5 minutes and 45 seconds. More than predicted target heart rate was achieved. No chest pain during exercise or recovery.  · ST segments did not show any diagnostic changes. There was no arrhythmia detected.  · The Duke Treadmill Score of 5.75 is consistent with a Low risk for ischemic heart disease.  · Blood pressure demonstrated a hypertensive response to stress. Heart rate demonstrated a normal response to stress.  · Findings consistent with a normal ECG stress test.        Interpretation Summary  Echocardiogram  · Normal left ventricular cavity size and wall thickness noted.  · Left ventricular ejection fraction appears to be 61 - 65%. Left ventricular systolic function is normal.  · Left ventricular diastolic function was normal.  · No significant valvular heart disease  · There is no evidence of pericardial effusion.          I personally viewed and interpreted the patient's LAB data         Assessment:     1. Preoperative clearance    2. Essential hypertension     3. Mixed hyperlipidemia    4. Morbid obesity with BMI of 45.0-49.9, adult (CMS/[facility])          Plan:        Patient is 40 years old white female who was seen by me for preop cardiac clearance for gastric sleeve.  She has multiple risk factors for coronary artery disease including obesity, hypertension, hyperlipidemia and diabetes mellitus.  She has been doing very well and is asymptomatic from cardiac standpoint.    Echocardiogram was normal with normal LV ejection fraction.  Stress test was negative for significant exercise-induced myocardial ischemia  Blood pressure was significantly elevated however it was due to lack of medications.  Since then her blood pressure has been normal.      Patient is cleared for surgery from cardiac standpoint as low risk.  She is not taking any anticoagulants however she is taking baby aspirin 81 mg daily.  She can stop baby aspirin 10 to 2 weeks prior to surgery and will be restarted whenever it is safe from surgical standpoint.                  Thank you for giving me the oppertunity to participate in your patient's cardiac care.    Sincerely,    JSO Riggs M.D. FACP FACC    No follow-ups on file.

## 2021-06-18 ENCOUNTER — LAB (OUTPATIENT)
Dept: LAB | Facility: HOSPITAL | Age: 41
End: 2021-06-18

## 2021-06-18 DIAGNOSIS — K21.9 GASTROESOPHAGEAL REFLUX DISEASE, UNSPECIFIED WHETHER ESOPHAGITIS PRESENT: ICD-10-CM

## 2021-06-18 PROCEDURE — U0003 INFECTIOUS AGENT DETECTION BY NUCLEIC ACID (DNA OR RNA); SEVERE ACUTE RESPIRATORY SYNDROME CORONAVIRUS 2 (SARS-COV-2) (CORONAVIRUS DISEASE [COVID-19]), AMPLIFIED PROBE TECHNIQUE, MAKING USE OF HIGH THROUGHPUT TECHNOLOGIES AS DESCRIBED BY CMS-2020-01-R: HCPCS

## 2021-06-18 PROCEDURE — C9803 HOPD COVID-19 SPEC COLLECT: HCPCS

## 2021-06-19 LAB — SARS-COV-2 RNA RESP QL NAA+PROBE: NOT DETECTED

## 2021-06-21 ENCOUNTER — LAB REQUISITION (OUTPATIENT)
Dept: LAB | Facility: HOSPITAL | Age: 41
End: 2021-06-21

## 2021-06-21 ENCOUNTER — OUTSIDE FACILITY SERVICE (OUTPATIENT)
Dept: BARIATRICS/WEIGHT MGMT | Facility: CLINIC | Age: 41
End: 2021-06-21

## 2021-06-21 ENCOUNTER — TELEPHONE (OUTPATIENT)
Dept: BARIATRICS/WEIGHT MGMT | Facility: CLINIC | Age: 41
End: 2021-06-21

## 2021-06-21 DIAGNOSIS — K21.9 GASTROESOPHAGEAL REFLUX DISEASE, UNSPECIFIED WHETHER ESOPHAGITIS PRESENT: Primary | ICD-10-CM

## 2021-06-21 DIAGNOSIS — K21.9 GASTRO-ESOPHAGEAL REFLUX DISEASE WITHOUT ESOPHAGITIS: ICD-10-CM

## 2021-06-21 PROCEDURE — 43239 EGD BIOPSY SINGLE/MULTIPLE: CPT | Performed by: SURGERY

## 2021-06-21 PROCEDURE — 88305 TISSUE EXAM BY PATHOLOGIST: CPT | Performed by: SURGERY

## 2021-06-21 NOTE — TELEPHONE ENCOUNTER
Notified pt that Dr. Salgado would like an UGI for further evaluation. I let pt know that she will be called for scheduling. Pt verbalized understanding.

## 2021-06-21 NOTE — TELEPHONE ENCOUNTER
----- Message from Evans Salgado MD sent at 6/21/2021  8:28 AM EDT -----  Please order a barium UGI, thanks!

## 2021-06-22 LAB
CYTO UR: NORMAL
LAB AP CASE REPORT: NORMAL
LAB AP CLINICAL INFORMATION: NORMAL
PATH REPORT.FINAL DX SPEC: NORMAL
PATH REPORT.GROSS SPEC: NORMAL

## 2021-06-25 ENCOUNTER — HOSPITAL ENCOUNTER (OUTPATIENT)
Dept: GENERAL RADIOLOGY | Facility: HOSPITAL | Age: 41
Discharge: HOME | End: 2021-06-25
Admitting: PHYSICIAN ASSISTANT

## 2021-06-25 DIAGNOSIS — K21.9 GASTROESOPHAGEAL REFLUX DISEASE, UNSPECIFIED WHETHER ESOPHAGITIS PRESENT: ICD-10-CM

## 2021-06-25 PROCEDURE — 74240 X-RAY XM UPR GI TRC 1CNTRST: CPT | Performed by: RADIOLOGY

## 2021-06-25 PROCEDURE — 74240 X-RAY XM UPR GI TRC 1CNTRST: CPT

## 2021-06-30 DIAGNOSIS — K21.9 GASTROESOPHAGEAL REFLUX DISEASE, UNSPECIFIED WHETHER ESOPHAGITIS PRESENT: ICD-10-CM

## 2021-06-30 DIAGNOSIS — R10.13 DYSPEPSIA: ICD-10-CM

## 2021-07-06 DIAGNOSIS — I10 ESSENTIAL HYPERTENSION: Primary | ICD-10-CM

## 2021-07-06 RX ORDER — TRIAMTERENE AND HYDROCHLOROTHIAZIDE 37.5; 25 MG/1; MG/1
1 CAPSULE ORAL EVERY MORNING
Qty: 90 CAPSULE | Refills: 1 | Status: SHIPPED | OUTPATIENT
Start: 2021-07-06 | End: 2021-07-30

## 2021-07-14 DIAGNOSIS — E11.65 UNCONTROLLED TYPE 2 DIABETES MELLITUS WITH HYPERGLYCEMIA (HCC): Primary | ICD-10-CM

## 2021-07-14 RX ORDER — ORAL SEMAGLUTIDE 14 MG/1
1 TABLET ORAL EVERY MORNING
Qty: 30 TABLET | Refills: 5 | Status: SHIPPED | OUTPATIENT
Start: 2021-07-14 | End: 2021-11-29

## 2021-07-21 ENCOUNTER — PATIENT MESSAGE (OUTPATIENT)
Dept: ENDOCRINOLOGY | Facility: CLINIC | Age: 41
End: 2021-07-21

## 2021-07-30 DIAGNOSIS — E11.65 UNCONTROLLED TYPE 2 DIABETES MELLITUS WITH HYPERGLYCEMIA (HCC): Primary | ICD-10-CM

## 2021-07-30 DIAGNOSIS — E20.0 IDIOPATHIC HYPOPARATHYROIDISM (HCC): ICD-10-CM

## 2021-07-30 RX ORDER — HYDROCHLOROTHIAZIDE 25 MG/1
25 TABLET ORAL DAILY
Qty: 90 TABLET | Refills: 1
Start: 2021-07-30 | End: 2022-06-28

## 2021-07-30 RX ORDER — DAPAGLIFLOZIN 10 MG/1
10 TABLET, FILM COATED ORAL EVERY MORNING
Qty: 30 TABLET | Refills: 5 | Status: SHIPPED | OUTPATIENT
Start: 2021-07-30 | End: 2022-04-13 | Stop reason: SINTOL

## 2021-08-02 ENCOUNTER — TELEPHONE (OUTPATIENT)
Dept: ENDOCRINOLOGY | Facility: CLINIC | Age: 41
End: 2021-08-02

## 2021-08-02 NOTE — TELEPHONE ENCOUNTER
PATIENTS PHARMACY SENT US DENIAL AND [facility] SHE IS CALLING TO CHECK STATUS. PHONE NUMBER -[phone]

## 2021-08-02 NOTE — TELEPHONE ENCOUNTER
Approvedtoday  PA Case: [id number], Status: Approved, Coverage Starts on: 8/2/2021 12:00:00 AM, Coverage Ends on: 8/2/2022 12:00:00 AM.  Drug  Farxiga 10MG tablets  Form  [employer] PA Form (2017 NCP[employer])

## 2021-09-16 DIAGNOSIS — E20.0 IDIOPATHIC HYPOPARATHYROIDISM (HCC): ICD-10-CM

## 2021-09-16 RX ORDER — ERGOCALCIFEROL 1.25 MG/1
50000 CAPSULE ORAL WEEKLY
Qty: 6 CAPSULE | Refills: 0 | Status: SHIPPED | OUTPATIENT
Start: 2021-09-16 | End: 2021-11-16

## 2021-11-01 DIAGNOSIS — E11.65 TYPE 2 DIABETES MELLITUS WITH HYPERGLYCEMIA, WITHOUT LONG-TERM CURRENT USE OF INSULIN (HCC): ICD-10-CM

## 2021-11-01 RX ORDER — METOPROLOL SUCCINATE 25 MG/1
50 TABLET, EXTENDED RELEASE ORAL DAILY
Qty: 180 TABLET | Refills: 3 | Status: SHIPPED | OUTPATIENT
Start: 2021-11-01 | End: 2022-11-14

## 2021-11-12 RX ORDER — INSULIN LISPRO 100 [IU]/ML
INJECTION, SOLUTION INTRAVENOUS; SUBCUTANEOUS
Qty: 75 ML | Refills: 3 | Status: SHIPPED | OUTPATIENT
Start: 2021-11-12 | End: 2022-04-13

## 2021-11-16 DIAGNOSIS — E20.0 IDIOPATHIC HYPOPARATHYROIDISM (HCC): ICD-10-CM

## 2021-11-16 RX ORDER — ERGOCALCIFEROL 1.25 MG/1
50000 CAPSULE ORAL WEEKLY
Qty: 12 CAPSULE | Refills: 0 | Status: SHIPPED | OUTPATIENT
Start: 2021-11-16 | End: 2022-04-20 | Stop reason: ALTCHOICE

## 2021-11-29 ENCOUNTER — OFFICE VISIT (OUTPATIENT)
Dept: ENDOCRINOLOGY | Facility: CLINIC | Age: 41
End: 2021-11-29

## 2021-11-29 VITALS
OXYGEN SATURATION: 98 % | DIASTOLIC BLOOD PRESSURE: 64 MMHG | HEART RATE: 88 BPM | HEIGHT: 64 IN | BODY MASS INDEX: 47.29 KG/M2 | WEIGHT: 277 LBS | SYSTOLIC BLOOD PRESSURE: 122 MMHG

## 2021-11-29 DIAGNOSIS — E20.0 IDIOPATHIC HYPOPARATHYROIDISM (HCC): ICD-10-CM

## 2021-11-29 DIAGNOSIS — E04.9 NODULAR GOITER: ICD-10-CM

## 2021-11-29 DIAGNOSIS — E11.65 TYPE 2 DIABETES MELLITUS WITH HYPERGLYCEMIA, WITHOUT LONG-TERM CURRENT USE OF INSULIN (HCC): Primary | ICD-10-CM

## 2021-11-29 DIAGNOSIS — I10 ESSENTIAL HYPERTENSION: ICD-10-CM

## 2021-11-29 DIAGNOSIS — E78.2 MIXED HYPERLIPIDEMIA: ICD-10-CM

## 2021-11-29 LAB
EXPIRATION DATE: NORMAL
EXPIRATION DATE: NORMAL
GLUCOSE BLDC GLUCOMTR-MCNC: 126 MG/DL (ref 70–130)
HBA1C MFR BLD: 7.8 %
Lab: NORMAL
Lab: NORMAL

## 2021-11-29 PROCEDURE — 83036 HEMOGLOBIN GLYCOSYLATED A1C: CPT | Performed by: INTERNAL MEDICINE

## 2021-11-29 PROCEDURE — 99214 OFFICE O/P EST MOD 30 MIN: CPT | Performed by: INTERNAL MEDICINE

## 2021-11-29 PROCEDURE — 95251 CONT GLUC MNTR ANALYSIS I&R: CPT | Performed by: INTERNAL MEDICINE

## 2022-01-04 DIAGNOSIS — E11.65 UNCONTROLLED TYPE 2 DIABETES MELLITUS WITH HYPERGLYCEMIA: ICD-10-CM

## 2022-01-04 RX ORDER — PEN NEEDLE, DIABETIC 31 GX5/16"
NEEDLE, DISPOSABLE MISCELLANEOUS
Qty: 150 EACH | Refills: 5 | Status: SHIPPED | OUTPATIENT
Start: 2022-01-04

## 2022-01-10 RX ORDER — AZITHROMYCIN 250 MG/1
TABLET, FILM COATED ORAL
Qty: 6 TABLET | Refills: 0 | Status: SHIPPED | OUTPATIENT
Start: 2022-01-10 | End: 2022-04-13

## 2022-01-17 DIAGNOSIS — E11.65 UNCONTROLLED TYPE 2 DIABETES MELLITUS WITH HYPERGLYCEMIA: Primary | ICD-10-CM

## 2022-01-17 RX ORDER — INSULIN HUMAN 100 [IU]/ML
60 INJECTION, SUSPENSION SUBCUTANEOUS 2 TIMES DAILY
Qty: 45 ML | Refills: 5 | Status: SHIPPED | OUTPATIENT
Start: 2022-01-17 | End: 2022-03-04

## 2022-01-17 RX ORDER — INSULIN HUMAN 100 [IU]/ML
INJECTION, SUSPENSION SUBCUTANEOUS
COMMUNITY
Start: 2022-01-14 | End: 2022-01-17 | Stop reason: SDUPTHER

## 2022-01-25 DIAGNOSIS — E11.65 UNCONTROLLED TYPE 2 DIABETES MELLITUS WITH HYPERGLYCEMIA: ICD-10-CM

## 2022-01-25 RX ORDER — PROCHLORPERAZINE 25 MG/1
SUPPOSITORY RECTAL
Qty: 3 EACH | Refills: 5 | Status: SHIPPED | OUTPATIENT
Start: 2022-01-25 | End: 2022-09-27

## 2022-01-27 ENCOUNTER — TELEPHONE (OUTPATIENT)
Dept: BARIATRICS/WEIGHT MGMT | Facility: CLINIC | Age: 42
End: 2022-01-27

## 2022-01-27 NOTE — TELEPHONE ENCOUNTER
Patient states her frustration with her inability to have surgery at this time (Covid-19 restrictions).  Patient states she has to have surgery within the next four months or she will have to pay for all testing again.  Patient would like to speak to Dr. Salgado over the phone or in person to make sure he knows what is currently going with his office staff, Scheduling at [facility], and billing/insurance.  Patient states that it is getting to the point that if she is not allowed to talk to Dr. Salgado, she will find another office.  I told patient that I don't have access to Dr. Salgado, but I will forward complaint to my .

## 2022-02-08 DIAGNOSIS — E11.65 UNCONTROLLED TYPE 2 DIABETES MELLITUS WITH HYPERGLYCEMIA: Primary | ICD-10-CM

## 2022-02-08 RX ORDER — ORAL SEMAGLUTIDE 3 MG/1
3 TABLET ORAL EVERY MORNING
Qty: 30 TABLET | Refills: 2 | Status: ON HOLD | OUTPATIENT
Start: 2022-02-08 | End: 2022-09-08

## 2022-03-04 DIAGNOSIS — E11.65 UNCONTROLLED TYPE 2 DIABETES MELLITUS WITH HYPERGLYCEMIA: ICD-10-CM

## 2022-03-04 RX ORDER — INSULIN HUMAN 100 [IU]/ML
INJECTION, SUSPENSION SUBCUTANEOUS
Qty: 30 ML | Refills: 5 | Status: SHIPPED | OUTPATIENT
Start: 2022-03-04 | End: 2022-04-13

## 2022-03-04 NOTE — TELEPHONE ENCOUNTER
PATIENT IS COMPLETELY OF THIS MEDICATION. SHE DIDN'T HAVE ANY FOR THIS MORNING AND NEEDS US TO SEND PRESCRIPTION WITH REFILLS TODAY.

## 2022-03-04 NOTE — TELEPHONE ENCOUNTER
Pharmacy requesting refill  Last seen: 11/29/21  Next appt: 4/13/22  Last OV states 50 BID. Script is 60 BID. Didn't want to change if incase it needed to state 60.

## 2022-03-17 DIAGNOSIS — E20.0 IDIOPATHIC HYPOPARATHYROIDISM: ICD-10-CM

## 2022-03-17 RX ORDER — ERGOCALCIFEROL 1.25 MG/1
CAPSULE ORAL
Qty: 4 CAPSULE | Refills: 0 | OUTPATIENT
Start: 2022-03-17

## 2022-04-06 DIAGNOSIS — M25.561 PAIN IN BOTH KNEES, UNSPECIFIED CHRONICITY: Primary | ICD-10-CM

## 2022-04-06 DIAGNOSIS — M25.562 PAIN IN BOTH KNEES, UNSPECIFIED CHRONICITY: Primary | ICD-10-CM

## 2022-04-13 ENCOUNTER — LAB (OUTPATIENT)
Dept: LAB | Facility: HOSPITAL | Age: 42
End: 2022-04-13

## 2022-04-13 ENCOUNTER — OFFICE VISIT (OUTPATIENT)
Dept: ENDOCRINOLOGY | Facility: CLINIC | Age: 42
End: 2022-04-13

## 2022-04-13 VITALS
BODY MASS INDEX: 49.34 KG/M2 | WEIGHT: 289 LBS | SYSTOLIC BLOOD PRESSURE: 128 MMHG | HEART RATE: 89 BPM | OXYGEN SATURATION: 98 % | HEIGHT: 64 IN | DIASTOLIC BLOOD PRESSURE: 70 MMHG

## 2022-04-13 DIAGNOSIS — E04.9 NODULAR GOITER: ICD-10-CM

## 2022-04-13 DIAGNOSIS — E11.65 TYPE 2 DIABETES MELLITUS WITH HYPERGLYCEMIA, WITHOUT LONG-TERM CURRENT USE OF INSULIN: Primary | ICD-10-CM

## 2022-04-13 DIAGNOSIS — E20.0 IDIOPATHIC HYPOPARATHYROIDISM: ICD-10-CM

## 2022-04-13 DIAGNOSIS — I10 ESSENTIAL HYPERTENSION: ICD-10-CM

## 2022-04-13 DIAGNOSIS — E78.2 MIXED HYPERLIPIDEMIA: ICD-10-CM

## 2022-04-13 LAB
EXPIRATION DATE: NORMAL
GLUCOSE BLDC GLUCOMTR-MCNC: 146 MG/DL (ref 70–130)
HBA1C MFR BLD: 9 %
Lab: NORMAL

## 2022-04-13 PROCEDURE — 80053 COMPREHEN METABOLIC PANEL: CPT | Performed by: INTERNAL MEDICINE

## 2022-04-13 PROCEDURE — 95251 CONT GLUC MNTR ANALYSIS I&R: CPT | Performed by: INTERNAL MEDICINE

## 2022-04-13 PROCEDURE — 83036 HEMOGLOBIN GLYCOSYLATED A1C: CPT | Performed by: INTERNAL MEDICINE

## 2022-04-13 PROCEDURE — 99214 OFFICE O/P EST MOD 30 MIN: CPT | Performed by: INTERNAL MEDICINE

## 2022-04-13 RX ORDER — INSULIN HUMAN 500 [IU]/ML
INJECTION, SOLUTION SUBCUTANEOUS
Qty: 12 ML | Refills: 5 | Status: SHIPPED | OUTPATIENT
Start: 2022-04-13 | End: 2022-09-09 | Stop reason: HOSPADM

## 2022-04-14 LAB
ALBUMIN SERPL-MCNC: 3.8 G/DL (ref 3.5–5.2)
ALBUMIN/GLOB SERPL: 1.5 G/DL
ALP SERPL-CCNC: 74 U/L (ref 39–117)
ALT SERPL W P-5'-P-CCNC: 30 U/L (ref 1–33)
ANION GAP SERPL CALCULATED.3IONS-SCNC: 13.1 MMOL/L (ref 5–15)
AST SERPL-CCNC: 24 U/L (ref 1–32)
BILIRUB SERPL-MCNC: 0.7 MG/DL (ref 0–1.2)
BUN SERPL-MCNC: 12 MG/DL (ref 6–20)
BUN/CREAT SERPL: 12.9 (ref 7–25)
CALCIUM SPEC-SCNC: 8.7 MG/DL (ref 8.6–10.5)
CHLORIDE SERPL-SCNC: 100 MMOL/L (ref 98–107)
CO2 SERPL-SCNC: 25.9 MMOL/L (ref 22–29)
CREAT SERPL-MCNC: 0.93 MG/DL (ref 0.57–1)
EGFRCR SERPLBLD CKD-EPI 2021: 78.9 ML/MIN/1.73
GLOBULIN UR ELPH-MCNC: 2.6 GM/DL
GLUCOSE SERPL-MCNC: 154 MG/DL (ref 65–99)
POTASSIUM SERPL-SCNC: 4 MMOL/L (ref 3.5–5.2)
PROT SERPL-MCNC: 6.4 G/DL (ref 6–8.5)
SODIUM SERPL-SCNC: 139 MMOL/L (ref 136–145)

## 2022-04-18 DIAGNOSIS — E11.65 UNCONTROLLED TYPE 2 DIABETES MELLITUS WITH HYPERGLYCEMIA: ICD-10-CM

## 2022-04-18 RX ORDER — PROCHLORPERAZINE 25 MG/1
SUPPOSITORY RECTAL
Qty: 1 EACH | Refills: 0 | Status: SHIPPED | OUTPATIENT
Start: 2022-04-18 | End: 2022-07-25

## 2022-04-20 ENCOUNTER — OFFICE VISIT (OUTPATIENT)
Dept: ORTHOPEDIC SURGERY | Facility: CLINIC | Age: 42
End: 2022-04-20

## 2022-04-20 ENCOUNTER — HOSPITAL ENCOUNTER (OUTPATIENT)
Dept: GENERAL RADIOLOGY | Facility: HOSPITAL | Age: 42
Discharge: HOME | End: 2022-04-20
Admitting: ORTHOPAEDIC SURGERY

## 2022-04-20 VITALS
HEIGHT: 64 IN | WEIGHT: 280 LBS | SYSTOLIC BLOOD PRESSURE: 131 MMHG | BODY MASS INDEX: 47.8 KG/M2 | DIASTOLIC BLOOD PRESSURE: 77 MMHG | HEART RATE: 73 BPM

## 2022-04-20 DIAGNOSIS — M17.0 PRIMARY OSTEOARTHRITIS OF KNEES, BILATERAL: Primary | ICD-10-CM

## 2022-04-20 DIAGNOSIS — M25.562 PAIN IN BOTH KNEES, UNSPECIFIED CHRONICITY: ICD-10-CM

## 2022-04-20 DIAGNOSIS — M25.561 PAIN IN BOTH KNEES, UNSPECIFIED CHRONICITY: ICD-10-CM

## 2022-04-20 DIAGNOSIS — M10.9 GOUT OF LEFT KNEE, UNSPECIFIED CAUSE, UNSPECIFIED CHRONICITY: ICD-10-CM

## 2022-04-20 DIAGNOSIS — M25.562 PAIN IN BOTH KNEES, UNSPECIFIED CHRONICITY: Primary | ICD-10-CM

## 2022-04-20 DIAGNOSIS — M25.561 PAIN IN BOTH KNEES, UNSPECIFIED CHRONICITY: Primary | ICD-10-CM

## 2022-04-20 PROCEDURE — 73562 X-RAY EXAM OF KNEE 3: CPT

## 2022-04-20 PROCEDURE — 89060 EXAM SYNOVIAL FLUID CRYSTALS: CPT | Performed by: ORTHOPAEDIC SURGERY

## 2022-04-20 PROCEDURE — 20610 DRAIN/INJ JOINT/BURSA W/O US: CPT | Performed by: ORTHOPAEDIC SURGERY

## 2022-04-20 PROCEDURE — 73562 X-RAY EXAM OF KNEE 3: CPT | Performed by: RADIOLOGY

## 2022-04-20 PROCEDURE — 99203 OFFICE O/P NEW LOW 30 MIN: CPT | Performed by: ORTHOPAEDIC SURGERY

## 2022-04-20 RX ORDER — METHYLPREDNISOLONE ACETATE 40 MG/ML
40 INJECTION, SUSPENSION INTRA-ARTICULAR; INTRALESIONAL; INTRAMUSCULAR; SOFT TISSUE
Status: COMPLETED | OUTPATIENT
Start: 2022-04-20 | End: 2022-04-20

## 2022-04-20 RX ORDER — LIDOCAINE HYDROCHLORIDE 20 MG/ML
5 INJECTION, SOLUTION INFILTRATION; PERINEURAL
Status: COMPLETED | OUTPATIENT
Start: 2022-04-20 | End: 2022-04-20

## 2022-04-20 RX ADMIN — LIDOCAINE HYDROCHLORIDE 5 ML: 20 INJECTION, SOLUTION INFILTRATION; PERINEURAL at 22:40

## 2022-04-20 RX ADMIN — METHYLPREDNISOLONE ACETATE 40 MG: 40 INJECTION, SUSPENSION INTRA-ARTICULAR; INTRALESIONAL; INTRAMUSCULAR; SOFT TISSUE at 22:41

## 2022-04-20 RX ADMIN — LIDOCAINE HYDROCHLORIDE 5 ML: 20 INJECTION, SOLUTION INFILTRATION; PERINEURAL at 22:41

## 2022-04-20 RX ADMIN — METHYLPREDNISOLONE ACETATE 40 MG: 40 INJECTION, SUSPENSION INTRA-ARTICULAR; INTRALESIONAL; INTRAMUSCULAR; SOFT TISSUE at 22:40

## 2022-04-20 NOTE — PROGRESS NOTES
New Patient Visit      Patient: Natty Fuentes  YOB: 1980  Date of Encounter: 04/20/2022        Chief Complaint:   Chief Complaint   Patient presents with   • Right Knee - Initial Evaluation, Pain   • Left Knee - Initial Evaluation, Pain           HPI:   Natty Fuentes, 41 y.o. female, referred by Nidia Naranjo MD presents for evaluation of bilateral knee pain right greater than the left of approximately 1 years duration.  She reports no injury recent or remote.  She did receive steroid injection by orthopedic surgeon in [place] about 1 year ago she reports that her knee was much worse for several days.  She denies giving way or locking.  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  She describes pain much worse toward the end of the shift.  She has no weakness or numbness to either leg.  Her medical history is remarkable for gout family history is positive for osteoarthritis and gout.  Reports that her most recent uric acid was normal.  Medical history also includes type 2 diabetes and chronic kidney disease.        Active Problem List:  Patient Active Problem List   Diagnosis   • Arthritis   • GERD (gastroesophageal reflux disease)   • Gout   • Mixed hyperlipidemia   • Idiopathic hypoparathyroidism (HCC)   • Essential hypertension   • Uncontrolled type 2 diabetes mellitus with hyperglycemia (HCC)   • SANYA on CPAP   • Generalized edema   • Nodular goiter   • Lower extremity edema   • Fatigue   • Morbid obesity with BMI of 45.0-49.9, adult (HCC)   • Type 2 diabetes mellitus (HCC)   • Sleep apnea   • S/P rukhsana   • Hypocalcemia   • CKD (chronic kidney disease)   • Acid reflux   • Preoperative clearance           Past Medical History:  Past Medical History:   Diagnosis Date   • Acid reflux     controlled on protonix 40mg daily, EGD 2017 with ulcer, schlatzis ring   • Arthritis     CRP/ sed rate elevated, sees rheumatologist, questionable AI disease   • CKD (chronic kidney disease)    • Fatigue    • GERD  (gastroesophageal reflux disease)    • Goiter 04/2016   • Gout     has prednisone prn to take for flares, last 2019   • Hip arthrosis 05/2017   • Hirsutism 12/2000   • Hyperlipidemia    • Hypertension    • Hypocalcemia    • Hyponatremia 04/01/22    After having nausea and vomiting for several days was noted with low sodium.   • Hypoparathyroidism (HCC)     on cacium supplements and Vit D   • Knee swelling 06/2021   • Lower extremity edema    • Morbid obesity with BMI of 45.0-49.9, adult (HCC)    • Obesity    • Polycystic ovary syndrome 12/2000   • Renal calculi    • S/P rukhsana     gallstones   • Sleep apnea     CPAP compliant   • Thyroid nodule 04/2016   • Type 2 diabetes mellitus (HCC)     dx 2001, on insulin since 1/2021, last A1C 6.8, sees endocrine   • Vitamin D deficiency 04/2016           Past Surgical History:  Past Surgical History:   Procedure Laterality Date   • ELBOW PROCEDURE  06/2001    Surgery done after fall with fracture of left elbow.   • LAPAROSCOPIC CHOLECYSTECTOMY  2006    gallstones   • OTHER SURGICAL HISTORY      Elbow surgery           Family History:  Family History   Problem Relation Age of Onset   • Hypertension Mother    • Hyperlipidemia Mother    • Lupus Mother    • Cancer Mother    • Pulmonary embolism Mother    • Obesity Mother    • Diabetes Mother    • Osteoarthritis Mother    • Gout Mother    • Cancer Father    • Gout Father    • Osteoarthritis Father    • Diabetes Father    • Kidney failure Father    • Hypertension Father    • Hyperlipidemia Father    • Obesity Father    • Sleep apnea Father    • Hyperlipidemia Sister    • Hypertension Sister    • Diabetes Sister    • Pulmonary embolism Sister    • Obesity Sister    • Sleep apnea Sister    • Pulmonary embolism Sister    • Hypertension Brother    • Hyperlipidemia Brother    • Diabetes Brother    • Obesity Brother    • Sleep apnea Brother    • Obesity Maternal Grandmother    • Diabetes Maternal Grandmother    • Hypertension Maternal  Grandmother    • Pulmonary embolism Maternal Grandmother    • Hypertension Maternal Grandfather    • Pulmonary embolism Maternal Grandfather    • Obesity Paternal Grandmother    • Diabetes Paternal Grandmother    • Hypertension Paternal Grandmother    • Sleep apnea Paternal Grandmother    • Obesity Paternal Grandfather    • Hypertension Paternal Grandfather    • Heart attack Paternal Grandfather    • Arthritis Other    • Colon cancer Other    • Hyperlipidemia Other    • Hypertension Other    • Obesity Other    • Diabetes Other    • Kidney disease Other    • Thyroid disease Other    • Stroke Other          Social History:  Social History     Socioeconomic History   • Marital status: Single   Tobacco Use   • Smoking status: Never Smoker   • Smokeless tobacco: Never Used   Vaping Use   • Vaping Use: Never used   Substance and Sexual Activity   • Alcohol use: Never   • Drug use: Never   • Sexual activity: Not Currently     Body mass index is 48.06 kg/m².      Medications:  Current Outpatient Medications   Medication Sig Dispense Refill   • allopurinol (ZYLOPRIM) 300 MG tablet Take  by mouth.     • aspirin 81 MG EC tablet Take 81 mg by mouth Daily.     • calcitriol (ROCALTROL) 0.25 MCG capsule TAKE 2 CAPSULES BY MOUTH EVERY DAY 60 capsule 11   • Calcium Carb-Cholecalciferol 600-200 MG-UNIT tablet Take 600 mg by mouth 4 (Four) Times a Day.     • fenofibrate (TRICOR) 145 MG tablet TAKE ONE TABLET BY MOUTH EVERY DAY 90 tablet 3   • hydroCHLOROthiazide (HYDRODIURIL) 25 MG tablet Take 1 tablet by mouth Daily. 90 tablet 1   • Insulin Regular Human, Conc, (HumuLIN R U-500 KwikPen) 500 UNIT/ML solution pen-injector CONCENTRATED injection 40 units AC BK, 80 units AC LN, 80 units AC DN 12 mL 5   • lisinopril (PRINIVIL,ZESTRIL) 40 MG tablet Take 20 mg by mouth Daily.     • loratadine (CLARITIN) 10 MG tablet Take 10 mg by mouth Daily.     • metFORMIN (GLUCOPHAGE) 1000 MG tablet Take 1 tablet by mouth 2 (Two) Times a Day With Meals.  "180 tablet 3   • pantoprazole (PROTONIX) 40 MG EC tablet Take  by mouth.     • POTASSIUM CITRATE ER PO Take 10 mEq by mouth Daily.     • pravastatin (PRAVACHOL) 40 MG tablet Take 1 tablet by mouth Daily. 90 tablet 3   • Semaglutide (Rybelsus) 3 MG tablet Take 1 tablet by mouth Every Morning. 30 tablet 2   • B-D ULTRAFINE III SHORT PEN 31G X 8 MM misc USE 1 NEW PEN NEEDLE 5 TIMES DAILY 150 each 5   • Blood Glucose Monitoring Suppl (True Metrix Meter) device 1 Device Take As Directed. 1 Device 0   • Continuous Blood Gluc Sensor (Dexcom G6 Sensor) USE AS DIRECTED EVERY  10  DAYS 3 each 5   • Continuous Blood Gluc Transmit (Dexcom G6 Transmitter) misc USE AS DIRECTED EVERY  3  MONTHS 1 each 0   • Lancets 30G misc True Metrix Test 5 times dailt 360 each 3   • metoprolol succinate XL (TOPROL-XL) 25 MG 24 hr tablet Take 2 tablets by mouth Daily. 180 tablet 3   • True Metrix Blood Glucose Test test strip 1 each by Other route 5 (Five) Times a Day. Use as instructed 150 each 5     No current facility-administered medications for this visit.         Allergies:  Allergies   Allergen Reactions   • Penicillins Rash   • Rocephin [Ceftriaxone] Rash         Review of Systems:   Review of Systems   Constitutional: Negative.    HENT: Negative.    Eyes: Negative.    Respiratory: Positive for apnea.    Cardiovascular: Positive for leg swelling.   Gastrointestinal: Positive for nausea and vomiting.   Endocrine: Negative.    Genitourinary: Negative.    Musculoskeletal: Positive for arthralgias, gait problem and joint swelling.   Skin: Negative.    Allergic/Immunologic: Negative.    Hematological: Negative.    Psychiatric/Behavioral: Negative.          Physical Exam:   Physical Exam  GENERAL: 41 y.o. female, alert and oriented X 3 in no acute distress.   Visit Vitals  /77   Pulse 73   Ht 162.6 cm (64\")   Wt 127 kg (280 lb)   BMI 48.06 kg/m²         Musculoskeletal:   Examination right knee reveals normal gross alignment minimal " effusion moderate medial joint line tenderness mild pain with compression of the patella with flexion extension motion is full neurovascular exam is grossly intact.    Left Knee evaluation reveals moderate effusion and moderate medial joint line tenderness no gross instability motion is full with discomfort upon full flexion Rosemary's is negative neurovascular exam grossly intact.      Radiology/Labs:     XR Knee 3 View Bilateral    Result Date: 4/20/2022  Mild osteoarthritis in both knees, fairly symmetric.  This report was finalized on 4/20/2022 12:20 PM by Dr. Lucio Wong II, MD.        Radiographs my review show mild tricompartmental osteoarthritis.        Assessment & Plan:   41 y.o. female presents with of history of gout and family history positive for osteoarthritis with bilateral knee pain with previous failed steroid injection.  I think she is best served with aspiration and steroid injections after discussion she was treated with aspiration left knee removing 14 cc of serous fluid 2 cc from the right, both knees injected with Depo-Medrol 40 mg intra-articular with lidocaine block.  She is not a candidate for viscous injections due to her insurance.  She will return in the future as needed.  Current BMI is 48 she is in the process of considering weight loss surgery.        ICD-10-CM ICD-9-CM   1. Primary osteoarthritis of knees, bilateral  M17.0 715.16   2. Pain in both knees, unspecified chronicity  M25.561 719.46    M25.562    3. Gout of left knee, unspecified cause, unspecified chronicity  M10.9 274.9         Large Joint Arthrocentesis: L knee  Date/Time: 4/20/2022 10:40 PM  Consent given by: patient  Timeout: Immediately prior to procedure a time out was called to verify the correct patient, procedure, equipment, support staff and site/side marked as required   Supporting Documentation  Indications: pain and joint swelling   Procedure Details  Location: knee - L knee  Preparation: Patient was  prepped and draped in the usual sterile fashion  Needle size: 18 G  Approach: anterolateral  Medications administered: 5 mL lidocaine 2%; 40 mg methylPREDNISolone acetate 40 MG/ML  Aspirate amount: 14 mL  Aspirate: serous  Patient tolerance: patient tolerated the procedure well with no immediate complications    Large Joint Arthrocentesis: R knee  Date/Time: 4/20/2022 10:41 PM  Consent given by: patient  Timeout: Immediately prior to procedure a time out was called to verify the correct patient, procedure, equipment, support staff and site/side marked as required   Supporting Documentation  Indications: pain   Procedure Details  Location: knee - R knee  Preparation: Patient was prepped and draped in the usual sterile fashion  Needle size: 18 G  Approach: anterolateral  Medications administered: 5 mL lidocaine 2%; 40 mg methylPREDNISolone acetate 40 MG/ML  Aspirate amount: 2 mL  Aspirate: serous  Patient tolerance: patient tolerated the procedure well with no immediate complications            Cc:   Nidia Naranjo MD                This document has been electronically signed by Jensen Atkinson MD   April 20, 2022 22:35 EDT

## 2022-04-21 LAB — CRYSTALS FLD MICRO: NORMAL

## 2022-05-01 RX ORDER — LISINOPRIL 40 MG/1
40 TABLET ORAL DAILY
Status: SHIPPED
Start: 2022-05-01

## 2022-05-24 DIAGNOSIS — E20.0 IDIOPATHIC HYPOPARATHYROIDISM: ICD-10-CM

## 2022-05-24 RX ORDER — CALCITRIOL 0.25 UG/1
CAPSULE, LIQUID FILLED ORAL
Qty: 60 CAPSULE | Refills: 11 | Status: SHIPPED | OUTPATIENT
Start: 2022-05-24

## 2022-06-17 DIAGNOSIS — R06.00 DYSPNEA, UNSPECIFIED TYPE: Primary | ICD-10-CM

## 2022-06-28 DIAGNOSIS — E20.0 IDIOPATHIC HYPOPARATHYROIDISM: ICD-10-CM

## 2022-06-28 RX ORDER — HYDROCHLOROTHIAZIDE 25 MG/1
TABLET ORAL
Qty: 30 TABLET | Refills: 11 | Status: SHIPPED | OUTPATIENT
Start: 2022-06-28 | End: 2022-09-09 | Stop reason: HOSPADM

## 2022-07-23 DIAGNOSIS — E11.65 UNCONTROLLED TYPE 2 DIABETES MELLITUS WITH HYPERGLYCEMIA: ICD-10-CM

## 2022-07-25 ENCOUNTER — TELEPHONE (OUTPATIENT)
Dept: ENDOCRINOLOGY | Facility: CLINIC | Age: 42
End: 2022-07-25

## 2022-07-25 RX ORDER — PROCHLORPERAZINE 25 MG/1
SUPPOSITORY RECTAL
Qty: 1 EACH | Refills: 0 | Status: SHIPPED | OUTPATIENT
Start: 2022-07-25 | End: 2022-10-27

## 2022-07-25 NOTE — TELEPHONE ENCOUNTER
PT CALLED REQUESTING DEXCOM TRANSMITTERS TO BE SENT IN TO [facility] IN [place], [place].    PT IS OUT OF SUPPLIES

## 2022-07-28 ENCOUNTER — HOSPITAL ENCOUNTER (OUTPATIENT)
Dept: GENERAL RADIOLOGY | Facility: HOSPITAL | Age: 42
Discharge: HOME | End: 2022-07-28

## 2022-07-28 DIAGNOSIS — R06.00 DYSPNEA, UNSPECIFIED TYPE: ICD-10-CM

## 2022-07-29 ENCOUNTER — HOSPITAL ENCOUNTER (OUTPATIENT)
Dept: GENERAL RADIOLOGY | Facility: HOSPITAL | Age: 42
Discharge: HOME | End: 2022-07-29

## 2022-07-29 ENCOUNTER — LAB (OUTPATIENT)
Dept: LAB | Facility: HOSPITAL | Age: 42
End: 2022-07-29

## 2022-07-29 DIAGNOSIS — R06.00 DYSPNEA, UNSPECIFIED TYPE: ICD-10-CM

## 2022-07-29 DIAGNOSIS — R53.83 FATIGUE, UNSPECIFIED TYPE: Primary | ICD-10-CM

## 2022-07-29 DIAGNOSIS — R53.83 FATIGUE, UNSPECIFIED TYPE: ICD-10-CM

## 2022-07-29 LAB
ALBUMIN SERPL-MCNC: 4.18 G/DL (ref 3.5–5.2)
ALBUMIN/GLOB SERPL: 1.5 G/DL
ALP SERPL-CCNC: 89 U/L (ref 39–117)
ALT SERPL W P-5'-P-CCNC: 19 U/L (ref 1–33)
ANION GAP SERPL CALCULATED.3IONS-SCNC: 18.7 MMOL/L (ref 5–15)
AST SERPL-CCNC: 25 U/L (ref 1–32)
BILIRUB SERPL-MCNC: 0.4 MG/DL (ref 0–1.2)
BUN SERPL-MCNC: 14 MG/DL (ref 6–20)
BUN/CREAT SERPL: 17.3 (ref 7–25)
CALCIUM SPEC-SCNC: 8.3 MG/DL (ref 8.6–10.5)
CHLORIDE SERPL-SCNC: 97 MMOL/L (ref 98–107)
CO2 SERPL-SCNC: 21.3 MMOL/L (ref 22–29)
CREAT SERPL-MCNC: 0.81 MG/DL (ref 0.57–1)
DEPRECATED RDW RBC AUTO: 45.6 FL (ref 37–54)
EGFRCR SERPLBLD CKD-EPI 2021: 93.1 ML/MIN/1.73
ERYTHROCYTE [DISTWIDTH] IN BLOOD BY AUTOMATED COUNT: 15.5 % (ref 12.3–15.4)
GLOBULIN UR ELPH-MCNC: 2.8 GM/DL
GLUCOSE SERPL-MCNC: 241 MG/DL (ref 65–99)
HCT VFR BLD AUTO: 37.9 % (ref 34–46.6)
HGB BLD-MCNC: 12.3 G/DL (ref 12–15.9)
MCH RBC QN AUTO: 26.5 PG (ref 26.6–33)
MCHC RBC AUTO-ENTMCNC: 32.5 G/DL (ref 31.5–35.7)
MCV RBC AUTO: 81.5 FL (ref 79–97)
PLATELET # BLD AUTO: 291 10*3/MM3 (ref 140–450)
PMV BLD AUTO: 10.1 FL (ref 6–12)
POTASSIUM SERPL-SCNC: 3.6 MMOL/L (ref 3.5–5.2)
PROT SERPL-MCNC: 7 G/DL (ref 6–8.5)
RBC # BLD AUTO: 4.65 10*6/MM3 (ref 3.77–5.28)
SODIUM SERPL-SCNC: 137 MMOL/L (ref 136–145)
WBC NRBC COR # BLD: 6.98 10*3/MM3 (ref 3.4–10.8)

## 2022-07-29 PROCEDURE — 85027 COMPLETE CBC AUTOMATED: CPT

## 2022-07-29 PROCEDURE — 36415 COLL VENOUS BLD VENIPUNCTURE: CPT

## 2022-07-29 PROCEDURE — 71046 X-RAY EXAM CHEST 2 VIEWS: CPT

## 2022-07-29 PROCEDURE — 71046 X-RAY EXAM CHEST 2 VIEWS: CPT | Performed by: RADIOLOGY

## 2022-07-29 PROCEDURE — 80053 COMPREHEN METABOLIC PANEL: CPT

## 2022-08-02 ENCOUNTER — CONSULT (OUTPATIENT)
Dept: BARIATRICS/WEIGHT MGMT | Facility: CLINIC | Age: 42
End: 2022-08-02

## 2022-08-02 VITALS
BODY MASS INDEX: 48.23 KG/M2 | OXYGEN SATURATION: 98 % | SYSTOLIC BLOOD PRESSURE: 130 MMHG | RESPIRATION RATE: 18 BRPM | HEIGHT: 64 IN | DIASTOLIC BLOOD PRESSURE: 84 MMHG | TEMPERATURE: 97.7 F | WEIGHT: 282.5 LBS | HEART RATE: 91 BPM

## 2022-08-02 DIAGNOSIS — K44.9 HIATAL HERNIA WITH GASTROESOPHAGEAL REFLUX: ICD-10-CM

## 2022-08-02 DIAGNOSIS — E66.01 MORBID OBESITY WITH BODY MASS INDEX OF 45.0-49.9 IN ADULT: Primary | ICD-10-CM

## 2022-08-02 DIAGNOSIS — K21.9 HIATAL HERNIA WITH GASTROESOPHAGEAL REFLUX: ICD-10-CM

## 2022-08-02 PROCEDURE — 99214 OFFICE O/P EST MOD 30 MIN: CPT | Performed by: SURGERY

## 2022-08-02 RX ORDER — SODIUM CHLORIDE 0.9 % (FLUSH) 0.9 %
3 SYRINGE (ML) INJECTION EVERY 12 HOURS SCHEDULED
Status: CANCELLED | OUTPATIENT
Start: 2022-08-02

## 2022-08-02 RX ORDER — SCOLOPAMINE TRANSDERMAL SYSTEM 1 MG/1
1 PATCH, EXTENDED RELEASE TRANSDERMAL ONCE
Status: CANCELLED | OUTPATIENT
Start: 2022-08-02 | End: 2022-08-02

## 2022-08-02 RX ORDER — SODIUM CHLORIDE 0.9 % (FLUSH) 0.9 %
3-10 SYRINGE (ML) INJECTION AS NEEDED
Status: CANCELLED | OUTPATIENT
Start: 2022-08-02

## 2022-08-02 RX ORDER — GABAPENTIN 100 MG/1
600 CAPSULE ORAL ONCE
Status: CANCELLED | OUTPATIENT
Start: 2022-08-02 | End: 2022-08-02

## 2022-08-02 RX ORDER — CHLORHEXIDINE GLUCONATE 0.12 MG/ML
30 RINSE ORAL
Status: CANCELLED | OUTPATIENT
Start: 2022-08-02 | End: 2022-08-02

## 2022-08-02 RX ORDER — SODIUM CHLORIDE 9 MG/ML
150 INJECTION, SOLUTION INTRAVENOUS CONTINUOUS
Status: CANCELLED | OUTPATIENT
Start: 2022-08-02

## 2022-08-02 RX ORDER — ENOXAPARIN SODIUM 150 MG/ML
40 INJECTION SUBCUTANEOUS ONCE
Status: CANCELLED | OUTPATIENT
Start: 2022-08-02 | End: 2022-08-02

## 2022-08-02 RX ORDER — ACETAMINOPHEN 500 MG
1000 TABLET ORAL ONCE
Status: CANCELLED | OUTPATIENT
Start: 2022-08-02 | End: 2022-08-02

## 2022-08-02 RX ORDER — PANTOPRAZOLE SODIUM 40 MG/10ML
40 INJECTION, POWDER, LYOPHILIZED, FOR SOLUTION INTRAVENOUS ONCE
Status: CANCELLED | OUTPATIENT
Start: 2022-08-02 | End: 2022-08-02

## 2022-09-01 ENCOUNTER — TELEMEDICINE (OUTPATIENT)
Dept: BARIATRICS/WEIGHT MGMT | Facility: CLINIC | Age: 42
End: 2022-09-01

## 2022-09-01 DIAGNOSIS — E66.01 OBESITY, CLASS III, BMI 40-49.9 (MORBID OBESITY): Primary | ICD-10-CM

## 2022-09-01 PROCEDURE — 99214 OFFICE O/P EST MOD 30 MIN: CPT | Performed by: PHYSICIAN ASSISTANT

## 2022-09-01 NOTE — PROGRESS NOTES
"[facility] Bariatric Surgery  [address]    [facility] [id number] [phone]        Patient Name:  Natty Fuentes.  :  1980      Reason for Visit:  weight gain; unable to maintain weight loss.   Evaluate for possible metabolic and bariatric surgery      HPI: Natty Fuentes is a 41 y.o. female who presents today for evaluation, education and consultation regarding metabolic and bariatric surgery (MBS).     Patient presents during the COVID-19 pandemic/federally declared [facility] of public health emergency.  This service was conducted via [employer].  Patient is located in [place].  Provider is located at her primary office location. The use of a video visit has been reviewed with the patient and verbal informed consent has been obtained.         Per Dr. Salgado' consult 22:   \"She is worried she may not lose weight with the sleeve.  She says her sister only lost 40 pounds after her sleeve and that her [place] surgeon Dr. Adan blamed on her using  Genepro as her protein source.  She said I did her friends sleeve (?Qing Eaton)  last 2021 and she is doing very well.  The patient says the sister is her twin and she just had 2 strokes, in addition she had a pulmonary embolism in the past and her doctors feel she needs to proceed with revisional metabolic and bariatric surgery as soon as possible.  She is now on anticoagulation.  She says she has already seen me for planned modified duodenal switch/sips.  The patient said her mother had a pulmonary embolism after colon surgery and one of her sisters has been diagnosed with antiphospholipid syndrome.  The patient herself has not had history of VTE.  She says she has never been pregnant.  She does wish to have postoperative Eliquis, see below.  She says her gastroparesis is significant with nausea and sour stomach all the time and is hoping sleeve gastrectomy with hiatal hernia repair will help her symptoms.  She understands " "there is no good operation for gastroparesis and is not interested in Christina-en-Y gastric bypass at this time.  She follows with endocrinologist Dr. Cintron who recommended she have metabolic and bariatric surgery and also follows her for her goiter.  The patient voiced understanding to hold her aspirin 1 week prior in 6 weeks after surgery to minimize the risk of delayed leak.  The patient says she has been heavy her entire life.\"    Today, states she is Feeling well with no concerns and eager to proceed.   Patient has been on the liver shrinking preop diet since last week.  Stopped ASA 1 week prior, didn't take today. Denies any ASA, NSAIDS, steroids, tramadol, tobacco/ nicotine use/ exposure., herbal supplements, hormones, diet pills.    Denies dysphagia, reflux, nausea, vomiting, abdominal pain, pulmonary issues and fevers. Has postop anticoagulation ready to start postoperatively. Patient has list to get vitamins and has protein supplements ready for postop.     Last visit in office weight was 282lb and BMI 48.49      Past Medical History:   Diagnosis Date   • Acid reflux     controlled on protonix 40mg daily, EGD 2017 with ulcer, schlatzis ring   • Arthritis     CRP/ sed rate elevated, sees rheumatologist, questionable AI disease   • CKD (chronic kidney disease)    • Fatigue    • GERD (gastroesophageal reflux disease)    • Goiter 04/2016   • Gout     has prednisone prn to take for flares, last 2019   • Hip arthrosis 05/2017   • Hirsutism 12/2000   • Hyperlipidemia    • Hypertension    • Hypocalcemia    • Hyponatremia 04/01/22    After having nausea and vomiting for several days was noted with low sodium.   • Hypoparathyroidism (HCC)     on cacium supplements and Vit D   • Knee swelling 06/2021   • Lower extremity edema    • Morbid obesity with BMI of 45.0-49.9, adult (HCC)    • Obesity    • Polycystic ovary syndrome 12/2000   • Renal calculi    • S/P rukhsana     gallstones   • Sleep apnea     CPAP compliant   • " Thyroid nodule 04/2016   • Type 2 diabetes mellitus (HCC)     dx 2001, on insulin since 1/2021, last A1C 6.8, sees endocrine   • Vitamin D deficiency 04/2016     Past Surgical History:   Procedure Laterality Date   • ELBOW PROCEDURE  06/2001    Surgery done after fall with fracture of left elbow.   • LAPAROSCOPIC CHOLECYSTECTOMY  2006    gallstones   • OTHER SURGICAL HISTORY      Elbow surgery     Outpatient Medications Marked as Taking for the 9/1/22 encounter (Telemedicine) with Parris Sanchez PA-C   Medication Sig Dispense Refill   • allopurinol (ZYLOPRIM) 300 MG tablet Take  by mouth.     • B-D ULTRAFINE III SHORT PEN 31G X 8 MM misc USE 1 NEW PEN NEEDLE 5 TIMES DAILY 150 each 5   • Blood Glucose Monitoring Suppl (True Metrix Meter) device 1 Device Take As Directed. 1 Device 0   • calcitriol (ROCALTROL) 0.25 MCG capsule TAKE 2 CAPSULES BY MOUTH EVERY DAY 60 capsule 11   • Calcium Carb-Cholecalciferol 600-200 MG-UNIT tablet Take 600 mg by mouth 4 (Four) Times a Day.     • Continuous Blood Gluc Sensor (Dexcom G6 Sensor) USE AS DIRECTED EVERY  10  DAYS 3 each 5   • Continuous Blood Gluc Transmit (Dexcom G6 Transmitter) misc USE AS DIRECTED 1 each 0   • fenofibrate (TRICOR) 145 MG tablet TAKE ONE TABLET BY MOUTH EVERY DAY 90 tablet 3   • hydroCHLOROthiazide (HYDRODIURIL) 25 MG tablet TAKE ONE TABLET BY MOUTH EVERY DAY 30 tablet 11   • Insulin Regular Human, Conc, (HumuLIN R U-500 KwikPen) 500 UNIT/ML solution pen-injector CONCENTRATED injection 40 units AC BK, 80 units AC LN, 80 units AC DN 12 mL 5   • Lancets 30G misc True Metrix Test 5 times dailt 360 each 3   • lisinopril (PRINIVIL,ZESTRIL) 40 MG tablet Take 1 tablet by mouth Daily.     • loratadine (CLARITIN) 10 MG tablet Take 10 mg by mouth Daily.     • metFORMIN (GLUCOPHAGE) 1000 MG tablet Take 1 tablet by mouth 2 (Two) Times a Day With Meals. 180 tablet 3   • metoprolol succinate XL (TOPROL-XL) 25 MG 24 hr tablet Take 2 tablets by mouth Daily. 180 tablet 3  "  • pantoprazole (PROTONIX) 40 MG EC tablet Take  by mouth.     • POTASSIUM CITRATE ER PO Take 10 mEq by mouth Daily.     • pravastatin (PRAVACHOL) 40 MG tablet Take 1 tablet by mouth Daily. 90 tablet 3   • Semaglutide (Rybelsus) 3 MG tablet Take 1 tablet by mouth Every Morning. 30 tablet 2   • True Metrix Blood Glucose Test test strip 1 each by Other route 5 (Five) Times a Day. Use as instructed 150 each 5       Allergies   Allergen Reactions   • Penicillins Rash   • Rocephin [Ceftriaxone] Rash       Social History     Socioeconomic History   • Marital status: Single   Tobacco Use   • Smoking status: Never Smoker   • Smokeless tobacco: Never Used   Vaping Use   • Vaping Use: Never used   Substance and Sexual Activity   • Alcohol use: Never   • Drug use: Never   • Sexual activity: Not Currently       There were no vitals taken for this visit.  ROS as in HPI  Physical Exam  Constitutional:       Appearance: She is well-developed.   HENT:      Head: Normocephalic and atraumatic.   Pulmonary:      Effort: Pulmonary effort is normal.   Neurological:      Mental Status: She is alert and oriented to person, place, and time.   Psychiatric:         Thought Content: Thought content normal.           Assessment:      ICD-10-CM ICD-9-CM   1. Obesity, Class III, BMI 40-49.9 (morbid obesity) ([facility])  E66.01 278.01       Per Dr. Salgado' consult 8/2/22:   \"Patient is aware that surgery is a tool, and that weight loss and improvement in comorbidities is not guaranteed but only seen in the context of appropriate use, follow up and physical activity.     The patient was present for an approximately a 2.5 hour discussion of the purpose of MBS, how MBS is a tool to assist in achieving weight loss goals, the most common complications and how best to avoid them, and the strategies for short and long term weight loss and improvement in comorbidities.  Ample opportunity to discuss questions was available both in group and during the time of " "individual examination.       Complications  of laparoscopic/possible robotic gastric sleeve were discussed. The patient is well aware of the potential complications of surgery that include but not limited to bleeding, infections, deep venous thrombosis, pulmonary embolism, pulmonary complications such as pneumonia, cardiac events, hernias, small bowel obstruction, damage to the spleen or other organs, bowel injury, disfiguring scars, failure to lose weight, need for additional surgery, conversion to an open procedure, and death. Patient is also aware of complications which apply in this particular procedure that can include but are not limited to a \"leak\" at the staple line which in some instances may require conversion to gastric bypass.     The patient is aware if a hiatal hernia is encountered, it likely will be repaired.  R/B/A Rx to hiatal hernia repair were discussed as outlined in our long consent form.  Briefly risks in addition to those for LSG include recurrent hernia, TIO, dysphagia, esophageal injury, pneumothorax, injury to the vagus nerves, injury to the thoracic duct, aorta or vena cava.     I discussed avoiding all tobacco products, nicotine,  and second hand smoke at least 2 weeks pre-operatively and 6 weeks post-operatively to minimize the risk of sleeve leak.  This included discussing the importance of avoiding even secondhand smoke as the risk of leak is increased.  Examples discussed:  Avoid going in a house or riding in a car where someone has previously smoked in the last 2 weeks and for 6 weeks postoperatively.  Avoid living in a house where someone smokes (even if it's in a separate room/patio/attached garage, etc.).   Avoid congregating with a group of people who are smoking even if it's outside.  It is OK to be around wood burning fires and barbecue.  I explained that I do not know if marijuana has a same effects but my overall recommendation is to avoid it for 2 weeks prior in 6 weeks " "after surgery.      Discussed the risks, benefits and alternative therapies at great length as outlined in our extensive consent forms, consent videos, and educational teaching process under the direction of the center's .     A copy of the patient's signed informed consent is on file\"    Plan:    Will proceed with laparoscopic sleeve gastrectomy, possible hiatal hernia if needed,  and EGD. R/b/a rx discussed including but not limited to bleeding, infection, damage to surrounding structures including leak from staple line,  bowel injury, pulm complications, venothromboembolic events, death etc and wishes to proceed.  Continue on pre-op diet as discussed. Avoid ASA, NSAIDs, steroids, tramadol, OCPs, diet pills, tobacco/ nicotine use/ exposure.  Follow up postop as directed. Advised having postop vitamins and protein supplements ready for home d/c. Patient has anticoagulation to start postoperatively.  Call with questions/ concerns.        "

## 2022-09-01 NOTE — H&P (VIEW-ONLY)
"[facility] Bariatric Surgery  [address]    [facility] [id number] [phone]        Patient Name:  Natty Fuentes.  :  1980      Reason for Visit:  weight gain; unable to maintain weight loss.   Evaluate for possible metabolic and bariatric surgery      HPI: Natty Fuentes is a 41 y.o. female who presents today for evaluation, education and consultation regarding metabolic and bariatric surgery (MBS).     Patient presents during the COVID-19 pandemic/federally declared [facility] of public health emergency.  This service was conducted via [employer].  Patient is located in [place].  Provider is located at her primary office location. The use of a video visit has been reviewed with the patient and verbal informed consent has been obtained.         Per Dr. Salgado' consult 22:   \"She is worried she may not lose weight with the sleeve.  She says her sister only lost 40 pounds after her sleeve and that her [place] surgeon Dr. Adan blamed on her using  Genepro as her protein source.  She said I did her friends sleeve (?Qing Eaton)  last 2021 and she is doing very well.  The patient says the sister is her twin and she just had 2 strokes, in addition she had a pulmonary embolism in the past and her doctors feel she needs to proceed with revisional metabolic and bariatric surgery as soon as possible.  She is now on anticoagulation.  She says she has already seen me for planned modified duodenal switch/sips.  The patient said her mother had a pulmonary embolism after colon surgery and one of her sisters has been diagnosed with antiphospholipid syndrome.  The patient herself has not had history of VTE.  She says she has never been pregnant.  She does wish to have postoperative Eliquis, see below.  She says her gastroparesis is significant with nausea and sour stomach all the time and is hoping sleeve gastrectomy with hiatal hernia repair will help her symptoms.  She understands " "there is no good operation for gastroparesis and is not interested in Christina-en-Y gastric bypass at this time.  She follows with endocrinologist Dr. Cintron who recommended she have metabolic and bariatric surgery and also follows her for her goiter.  The patient voiced understanding to hold her aspirin 1 week prior in 6 weeks after surgery to minimize the risk of delayed leak.  The patient says she has been heavy her entire life.\"    Today, states she is Feeling well with no concerns and eager to proceed.   Patient has been on the liver shrinking preop diet since last week.  Stopped ASA 1 week prior, didn't take today. Denies any ASA, NSAIDS, steroids, tramadol, tobacco/ nicotine use/ exposure., herbal supplements, hormones, diet pills.    Denies dysphagia, reflux, nausea, vomiting, abdominal pain, pulmonary issues and fevers. Has postop anticoagulation ready to start postoperatively. Patient has list to get vitamins and has protein supplements ready for postop.     Last visit in office weight was 282lb and BMI 48.49      Past Medical History:   Diagnosis Date   • Acid reflux     controlled on protonix 40mg daily, EGD 2017 with ulcer, schlatzis ring   • Arthritis     CRP/ sed rate elevated, sees rheumatologist, questionable AI disease   • CKD (chronic kidney disease)    • Fatigue    • GERD (gastroesophageal reflux disease)    • Goiter 04/2016   • Gout     has prednisone prn to take for flares, last 2019   • Hip arthrosis 05/2017   • Hirsutism 12/2000   • Hyperlipidemia    • Hypertension    • Hypocalcemia    • Hyponatremia 04/01/22    After having nausea and vomiting for several days was noted with low sodium.   • Hypoparathyroidism (HCC)     on cacium supplements and Vit D   • Knee swelling 06/2021   • Lower extremity edema    • Morbid obesity with BMI of 45.0-49.9, adult (HCC)    • Obesity    • Polycystic ovary syndrome 12/2000   • Renal calculi    • S/P rukhsana     gallstones   • Sleep apnea     CPAP compliant   • " Thyroid nodule 04/2016   • Type 2 diabetes mellitus (HCC)     dx 2001, on insulin since 1/2021, last A1C 6.8, sees endocrine   • Vitamin D deficiency 04/2016     Past Surgical History:   Procedure Laterality Date   • ELBOW PROCEDURE  06/2001    Surgery done after fall with fracture of left elbow.   • LAPAROSCOPIC CHOLECYSTECTOMY  2006    gallstones   • OTHER SURGICAL HISTORY      Elbow surgery     Outpatient Medications Marked as Taking for the 9/1/22 encounter (Telemedicine) with Parris Sanchez PA-C   Medication Sig Dispense Refill   • allopurinol (ZYLOPRIM) 300 MG tablet Take  by mouth.     • B-D ULTRAFINE III SHORT PEN 31G X 8 MM misc USE 1 NEW PEN NEEDLE 5 TIMES DAILY 150 each 5   • Blood Glucose Monitoring Suppl (True Metrix Meter) device 1 Device Take As Directed. 1 Device 0   • calcitriol (ROCALTROL) 0.25 MCG capsule TAKE 2 CAPSULES BY MOUTH EVERY DAY 60 capsule 11   • Calcium Carb-Cholecalciferol 600-200 MG-UNIT tablet Take 600 mg by mouth 4 (Four) Times a Day.     • Continuous Blood Gluc Sensor (Dexcom G6 Sensor) USE AS DIRECTED EVERY  10  DAYS 3 each 5   • Continuous Blood Gluc Transmit (Dexcom G6 Transmitter) misc USE AS DIRECTED 1 each 0   • fenofibrate (TRICOR) 145 MG tablet TAKE ONE TABLET BY MOUTH EVERY DAY 90 tablet 3   • hydroCHLOROthiazide (HYDRODIURIL) 25 MG tablet TAKE ONE TABLET BY MOUTH EVERY DAY 30 tablet 11   • Insulin Regular Human, Conc, (HumuLIN R U-500 KwikPen) 500 UNIT/ML solution pen-injector CONCENTRATED injection 40 units AC BK, 80 units AC LN, 80 units AC DN 12 mL 5   • Lancets 30G misc True Metrix Test 5 times dailt 360 each 3   • lisinopril (PRINIVIL,ZESTRIL) 40 MG tablet Take 1 tablet by mouth Daily.     • loratadine (CLARITIN) 10 MG tablet Take 10 mg by mouth Daily.     • metFORMIN (GLUCOPHAGE) 1000 MG tablet Take 1 tablet by mouth 2 (Two) Times a Day With Meals. 180 tablet 3   • metoprolol succinate XL (TOPROL-XL) 25 MG 24 hr tablet Take 2 tablets by mouth Daily. 180 tablet 3  "  • pantoprazole (PROTONIX) 40 MG EC tablet Take  by mouth.     • POTASSIUM CITRATE ER PO Take 10 mEq by mouth Daily.     • pravastatin (PRAVACHOL) 40 MG tablet Take 1 tablet by mouth Daily. 90 tablet 3   • Semaglutide (Rybelsus) 3 MG tablet Take 1 tablet by mouth Every Morning. 30 tablet 2   • True Metrix Blood Glucose Test test strip 1 each by Other route 5 (Five) Times a Day. Use as instructed 150 each 5       Allergies   Allergen Reactions   • Penicillins Rash   • Rocephin [Ceftriaxone] Rash       Social History     Socioeconomic History   • Marital status: Single   Tobacco Use   • Smoking status: Never Smoker   • Smokeless tobacco: Never Used   Vaping Use   • Vaping Use: Never used   Substance and Sexual Activity   • Alcohol use: Never   • Drug use: Never   • Sexual activity: Not Currently       There were no vitals taken for this visit.  ROS as in HPI  Physical Exam  Constitutional:       Appearance: She is well-developed.   HENT:      Head: Normocephalic and atraumatic.   Pulmonary:      Effort: Pulmonary effort is normal.   Neurological:      Mental Status: She is alert and oriented to person, place, and time.   Psychiatric:         Thought Content: Thought content normal.           Assessment:      ICD-10-CM ICD-9-CM   1. Obesity, Class III, BMI 40-49.9 (morbid obesity) ([facility])  E66.01 278.01       Per Dr. Salgado' consult 8/2/22:   \"Patient is aware that surgery is a tool, and that weight loss and improvement in comorbidities is not guaranteed but only seen in the context of appropriate use, follow up and physical activity.     The patient was present for an approximately a 2.5 hour discussion of the purpose of MBS, how MBS is a tool to assist in achieving weight loss goals, the most common complications and how best to avoid them, and the strategies for short and long term weight loss and improvement in comorbidities.  Ample opportunity to discuss questions was available both in group and during the time of " "individual examination.       Complications  of laparoscopic/possible robotic gastric sleeve were discussed. The patient is well aware of the potential complications of surgery that include but not limited to bleeding, infections, deep venous thrombosis, pulmonary embolism, pulmonary complications such as pneumonia, cardiac events, hernias, small bowel obstruction, damage to the spleen or other organs, bowel injury, disfiguring scars, failure to lose weight, need for additional surgery, conversion to an open procedure, and death. Patient is also aware of complications which apply in this particular procedure that can include but are not limited to a \"leak\" at the staple line which in some instances may require conversion to gastric bypass.     The patient is aware if a hiatal hernia is encountered, it likely will be repaired.  R/B/A Rx to hiatal hernia repair were discussed as outlined in our long consent form.  Briefly risks in addition to those for LSG include recurrent hernia, TIO, dysphagia, esophageal injury, pneumothorax, injury to the vagus nerves, injury to the thoracic duct, aorta or vena cava.     I discussed avoiding all tobacco products, nicotine,  and second hand smoke at least 2 weeks pre-operatively and 6 weeks post-operatively to minimize the risk of sleeve leak.  This included discussing the importance of avoiding even secondhand smoke as the risk of leak is increased.  Examples discussed:  Avoid going in a house or riding in a car where someone has previously smoked in the last 2 weeks and for 6 weeks postoperatively.  Avoid living in a house where someone smokes (even if it's in a separate room/patio/attached garage, etc.).   Avoid congregating with a group of people who are smoking even if it's outside.  It is OK to be around wood burning fires and barbecue.  I explained that I do not know if marijuana has a same effects but my overall recommendation is to avoid it for 2 weeks prior in 6 weeks " "after surgery.      Discussed the risks, benefits and alternative therapies at great length as outlined in our extensive consent forms, consent videos, and educational teaching process under the direction of the center's .     A copy of the patient's signed informed consent is on file\"    Plan:    Will proceed with laparoscopic sleeve gastrectomy, possible hiatal hernia if needed,  and EGD. R/b/a rx discussed including but not limited to bleeding, infection, damage to surrounding structures including leak from staple line,  bowel injury, pulm complications, venothromboembolic events, death etc and wishes to proceed.  Continue on pre-op diet as discussed. Avoid ASA, NSAIDs, steroids, tramadol, OCPs, diet pills, tobacco/ nicotine use/ exposure.  Follow up postop as directed. Advised having postop vitamins and protein supplements ready for home d/c. Patient has anticoagulation to start postoperatively.  Call with questions/ concerns.        "

## 2022-09-02 ENCOUNTER — PRE-ADMISSION TESTING (OUTPATIENT)
Dept: PREADMISSION TESTING | Facility: HOSPITAL | Age: 42
End: 2022-09-02

## 2022-09-02 DIAGNOSIS — K44.9 HIATAL HERNIA WITH GASTROESOPHAGEAL REFLUX: ICD-10-CM

## 2022-09-02 DIAGNOSIS — Z01.89 LABORATORY TEST: Primary | ICD-10-CM

## 2022-09-02 DIAGNOSIS — E66.01 MORBID OBESITY WITH BODY MASS INDEX OF 45.0-49.9 IN ADULT: ICD-10-CM

## 2022-09-02 DIAGNOSIS — R06.00 DYSPNEA, UNSPECIFIED TYPE: ICD-10-CM

## 2022-09-02 DIAGNOSIS — K21.9 HIATAL HERNIA WITH GASTROESOPHAGEAL REFLUX: ICD-10-CM

## 2022-09-02 LAB
ABO GROUP BLD: NORMAL
ALBUMIN SERPL-MCNC: 4.3 G/DL (ref 3.5–5.2)
ALBUMIN/GLOB SERPL: 1.7 G/DL
ALP SERPL-CCNC: 72 U/L (ref 39–117)
ALT SERPL W P-5'-P-CCNC: 23 U/L (ref 1–33)
ANION GAP SERPL CALCULATED.3IONS-SCNC: 12 MMOL/L (ref 5–15)
AST SERPL-CCNC: 22 U/L (ref 1–32)
BILIRUB SERPL-MCNC: 0.7 MG/DL (ref 0–1.2)
BUN SERPL-MCNC: 19 MG/DL (ref 6–20)
BUN/CREAT SERPL: 22.6 (ref 7–25)
CALCIUM SPEC-SCNC: 9 MG/DL (ref 8.6–10.5)
CHLORIDE SERPL-SCNC: 98 MMOL/L (ref 98–107)
CO2 SERPL-SCNC: 26 MMOL/L (ref 22–29)
CREAT SERPL-MCNC: 0.84 MG/DL (ref 0.57–1)
DEPRECATED RDW RBC AUTO: 44.3 FL (ref 37–54)
EGFRCR SERPLBLD CKD-EPI 2021: 89.1 ML/MIN/1.73
ERYTHROCYTE [DISTWIDTH] IN BLOOD BY AUTOMATED COUNT: 15.3 % (ref 12.3–15.4)
GLOBULIN UR ELPH-MCNC: 2.5 GM/DL
GLUCOSE SERPL-MCNC: 137 MG/DL (ref 65–99)
HBA1C MFR BLD: 8.4 % (ref 4.8–5.6)
HCT VFR BLD AUTO: 38.2 % (ref 34–46.6)
HGB BLD-MCNC: 12.7 G/DL (ref 12–15.9)
MCH RBC QN AUTO: 26.6 PG (ref 26.6–33)
MCHC RBC AUTO-ENTMCNC: 33.2 G/DL (ref 31.5–35.7)
MCV RBC AUTO: 80.1 FL (ref 79–97)
PLATELET # BLD AUTO: 278 10*3/MM3 (ref 140–450)
PMV BLD AUTO: 10.4 FL (ref 6–12)
POTASSIUM SERPL-SCNC: 4.1 MMOL/L (ref 3.5–5.2)
PROT SERPL-MCNC: 6.8 G/DL (ref 6–8.5)
QT INTERVAL: 426 MS
QTC INTERVAL: 452 MS
RBC # BLD AUTO: 4.77 10*6/MM3 (ref 3.77–5.28)
RH BLD: POSITIVE
SODIUM SERPL-SCNC: 136 MMOL/L (ref 136–145)
WBC NRBC COR # BLD: 6.99 10*3/MM3 (ref 3.4–10.8)

## 2022-09-02 PROCEDURE — 93005 ELECTROCARDIOGRAM TRACING: CPT

## 2022-09-02 PROCEDURE — 86901 BLOOD TYPING SEROLOGIC RH(D): CPT

## 2022-09-02 PROCEDURE — 86900 BLOOD TYPING SEROLOGIC ABO: CPT

## 2022-09-02 PROCEDURE — 36415 COLL VENOUS BLD VENIPUNCTURE: CPT

## 2022-09-02 PROCEDURE — 85027 COMPLETE CBC AUTOMATED: CPT

## 2022-09-02 PROCEDURE — 83036 HEMOGLOBIN GLYCOSYLATED A1C: CPT

## 2022-09-02 PROCEDURE — 93010 ELECTROCARDIOGRAM REPORT: CPT | Performed by: INTERNAL MEDICINE

## 2022-09-02 PROCEDURE — 80053 COMPREHEN METABOLIC PANEL: CPT

## 2022-09-02 PROCEDURE — 87081 CULTURE SCREEN ONLY: CPT

## 2022-09-02 NOTE — PAT
Per Anesthesia Request, patient instructed not to take their ACE/ARB medications on the AM of surgery.  (LISINOPRIL)    Patient instructed to drink 20 ounces of Gatorade and it needs to be completed 1 hour (for Main OR patients) or 2 hours (scheduled  section & [facility] patients) before given arrival time for procedure (NO RED Gatorade)    Patient verbalized understanding.    Patient to apply Chlorhexadine wipes  to surgical area (as instructed) the night before procedure and the AM of procedure. Wipes provided.    Too early to draw type and screen in PAT.  Please obtain blood bank specimen in pre-op on the day of surgery.    Consent signed.

## 2022-09-03 LAB — MRSA SPEC QL CULT: NORMAL

## 2022-09-06 ENCOUNTER — APPOINTMENT (OUTPATIENT)
Dept: PREADMISSION TESTING | Facility: HOSPITAL | Age: 42
End: 2022-09-06

## 2022-09-08 ENCOUNTER — HOSPITAL ENCOUNTER (INPATIENT)
Facility: HOSPITAL | Age: 42
LOS: 1 days | Discharge: HOME | End: 2022-09-09
Attending: SURGERY | Admitting: SURGERY

## 2022-09-08 ENCOUNTER — ANESTHESIA EVENT CONVERTED (OUTPATIENT)
Dept: ANESTHESIOLOGY | Facility: HOSPITAL | Age: 42
End: 2022-09-08

## 2022-09-08 ENCOUNTER — ANESTHESIA (OUTPATIENT)
Dept: PERIOP | Facility: HOSPITAL | Age: 42
End: 2022-09-08

## 2022-09-08 ENCOUNTER — ANESTHESIA EVENT (OUTPATIENT)
Dept: PERIOP | Facility: HOSPITAL | Age: 42
End: 2022-09-08

## 2022-09-08 DIAGNOSIS — K21.9 HIATAL HERNIA WITH GASTROESOPHAGEAL REFLUX: ICD-10-CM

## 2022-09-08 DIAGNOSIS — K44.9 HIATAL HERNIA WITH GASTROESOPHAGEAL REFLUX: ICD-10-CM

## 2022-09-08 DIAGNOSIS — E66.01 MORBID OBESITY WITH BODY MASS INDEX (BMI) OF 45.0 TO 49.9 IN ADULT: Primary | ICD-10-CM

## 2022-09-08 DIAGNOSIS — E66.01 MORBID OBESITY WITH BODY MASS INDEX OF 45.0-49.9 IN ADULT: ICD-10-CM

## 2022-09-08 LAB
ABO GROUP BLD: NORMAL
B-HCG UR QL: NEGATIVE
BLD GP AB SCN SERPL QL: NEGATIVE
EXPIRATION DATE: NORMAL
GLUCOSE BLDC GLUCOMTR-MCNC: 206 MG/DL (ref 70–130)
GLUCOSE BLDC GLUCOMTR-MCNC: 268 MG/DL (ref 70–130)
GLUCOSE BLDC GLUCOMTR-MCNC: 296 MG/DL (ref 70–130)
GLUCOSE BLDC GLUCOMTR-MCNC: 312 MG/DL (ref 70–130)
INTERNAL NEGATIVE CONTROL: NEGATIVE
INTERNAL POSITIVE CONTROL: POSITIVE
Lab: NORMAL
RH BLD: POSITIVE
T&S EXPIRATION DATE: NORMAL

## 2022-09-08 PROCEDURE — 88307 TISSUE EXAM BY PATHOLOGIST: CPT | Performed by: SURGERY

## 2022-09-08 PROCEDURE — 25010000002 AMISULPRIDE (ANTIEMETIC) 10 MG/4ML SOLUTION: Performed by: NURSE ANESTHETIST, CERTIFIED REGISTERED

## 2022-09-08 PROCEDURE — 25010000002 HYDROMORPHONE 1 MG/ML SOLUTION

## 2022-09-08 PROCEDURE — 25010000002 ENOXAPARIN PER 10 MG: Performed by: SURGERY

## 2022-09-08 PROCEDURE — 25010000002 LEVOFLOXACIN PER 250 MG: Performed by: SURGERY

## 2022-09-08 PROCEDURE — 0DB64Z3 EXCISION OF STOMACH, PERCUTANEOUS ENDOSCOPIC APPROACH, VERTICAL: ICD-10-PCS | Performed by: SURGERY

## 2022-09-08 PROCEDURE — 25010000002 DEXAMETHASONE SODIUM PHOSPHATE 10 MG/ML SOLUTION: Performed by: NURSE ANESTHETIST, CERTIFIED REGISTERED

## 2022-09-08 PROCEDURE — 86900 BLOOD TYPING SEROLOGIC ABO: CPT | Performed by: SURGERY

## 2022-09-08 PROCEDURE — C9399 UNCLASSIFIED DRUGS OR BIOLOG: HCPCS | Performed by: NURSE ANESTHETIST, CERTIFIED REGISTERED

## 2022-09-08 PROCEDURE — 86850 RBC ANTIBODY SCREEN: CPT | Performed by: SURGERY

## 2022-09-08 PROCEDURE — 63710000001 INSULIN LISPRO (HUMAN) PER 5 UNITS: Performed by: PHYSICIAN ASSISTANT

## 2022-09-08 PROCEDURE — 0BQT4ZZ REPAIR DIAPHRAGM, PERCUTANEOUS ENDOSCOPIC APPROACH: ICD-10-PCS | Performed by: SURGERY

## 2022-09-08 PROCEDURE — 25010000002 FENTANYL CITRATE (PF) 50 MCG/ML SOLUTION: Performed by: NURSE ANESTHETIST, CERTIFIED REGISTERED

## 2022-09-08 PROCEDURE — 81025 URINE PREGNANCY TEST: CPT | Performed by: SURGERY

## 2022-09-08 PROCEDURE — 25010000002 FENTANYL CITRATE (PF) 50 MCG/ML SOLUTION

## 2022-09-08 PROCEDURE — 25010000002 ONDANSETRON PER 1 MG: Performed by: NURSE ANESTHETIST, CERTIFIED REGISTERED

## 2022-09-08 PROCEDURE — 82962 GLUCOSE BLOOD TEST: CPT

## 2022-09-08 PROCEDURE — 25010000002 PROPOFOL 10 MG/ML EMULSION: Performed by: NURSE ANESTHETIST, CERTIFIED REGISTERED

## 2022-09-08 PROCEDURE — 25010000002 GLUCAGON (HUMAN RECOMBINANT) 1 MG RECONSTITUTED SOLUTION: Performed by: SURGERY

## 2022-09-08 PROCEDURE — S0260 H&P FOR SURGERY: HCPCS | Performed by: SURGERY

## 2022-09-08 PROCEDURE — 86901 BLOOD TYPING SEROLOGIC RH(D): CPT | Performed by: SURGERY

## 2022-09-08 PROCEDURE — 0DJ08ZZ INSPECTION OF UPPER INTESTINAL TRACT, VIA NATURAL OR ARTIFICIAL OPENING ENDOSCOPIC: ICD-10-PCS | Performed by: SURGERY

## 2022-09-08 PROCEDURE — 43775 LAP SLEEVE GASTRECTOMY: CPT | Performed by: SURGERY

## 2022-09-08 PROCEDURE — 63710000001 INSULIN LISPRO (HUMAN) PER 5 UNITS

## 2022-09-08 DEVICE — ABSORBABLE WOUND CLOSURE DEVICE
Type: IMPLANTABLE DEVICE | Site: ABDOMEN | Status: FUNCTIONAL
Brand: SYNETURE

## 2022-09-08 DEVICE — IMPLANTABLE DEVICE
Type: IMPLANTABLE DEVICE | Site: STOMACH | Status: FUNCTIONAL
Brand: TITAN SGS STANDARD GASTRIC STAPLER

## 2022-09-08 DEVICE — SEALANT WND FIBRIN TISSEEL PREFIL/SYR/PRIMAFZ 10ML: Type: IMPLANTABLE DEVICE | Site: ABDOMEN | Status: FUNCTIONAL

## 2022-09-08 RX ORDER — MIDAZOLAM HYDROCHLORIDE 1 MG/ML
1 INJECTION INTRAMUSCULAR; INTRAVENOUS
Status: DISCONTINUED | OUTPATIENT
Start: 2022-09-08 | End: 2022-09-08 | Stop reason: HOSPADM

## 2022-09-08 RX ORDER — ONDANSETRON 4 MG/1
4 TABLET, FILM COATED ORAL EVERY 6 HOURS PRN
Status: DISCONTINUED | OUTPATIENT
Start: 2022-09-12 | End: 2022-09-09 | Stop reason: HOSPADM

## 2022-09-08 RX ORDER — HYDRALAZINE HYDROCHLORIDE 20 MG/ML
10 INJECTION INTRAMUSCULAR; INTRAVENOUS
Status: DISCONTINUED | OUTPATIENT
Start: 2022-09-08 | End: 2022-09-09 | Stop reason: HOSPADM

## 2022-09-08 RX ORDER — ALPRAZOLAM 0.25 MG/1
0.25 TABLET ORAL ONCE AS NEEDED
Status: DISCONTINUED | OUTPATIENT
Start: 2022-09-08 | End: 2022-09-09 | Stop reason: HOSPADM

## 2022-09-08 RX ORDER — NICOTINE POLACRILEX 4 MG
15 LOZENGE BUCCAL
Status: DISCONTINUED | OUTPATIENT
Start: 2022-09-08 | End: 2022-09-09 | Stop reason: HOSPADM

## 2022-09-08 RX ORDER — CHLORHEXIDINE GLUCONATE 0.12 MG/ML
30 RINSE ORAL
Status: COMPLETED | OUTPATIENT
Start: 2022-09-08 | End: 2022-09-08

## 2022-09-08 RX ORDER — ALBUTEROL SULFATE 2.5 MG/3ML
2.5 SOLUTION RESPIRATORY (INHALATION) EVERY 4 HOURS PRN
Status: DISCONTINUED | OUTPATIENT
Start: 2022-09-08 | End: 2022-09-09 | Stop reason: HOSPADM

## 2022-09-08 RX ORDER — NICOTINE POLACRILEX 4 MG
15 LOZENGE BUCCAL
Status: DISCONTINUED | OUTPATIENT
Start: 2022-09-08 | End: 2022-09-08 | Stop reason: SDUPTHER

## 2022-09-08 RX ORDER — OXYCODONE HYDROCHLORIDE 5 MG/1
5 TABLET ORAL EVERY 6 HOURS PRN
Status: DISCONTINUED | OUTPATIENT
Start: 2022-09-08 | End: 2022-09-09 | Stop reason: HOSPADM

## 2022-09-08 RX ORDER — ONDANSETRON 2 MG/ML
INJECTION INTRAMUSCULAR; INTRAVENOUS AS NEEDED
Status: DISCONTINUED | OUTPATIENT
Start: 2022-09-08 | End: 2022-09-08 | Stop reason: SURG

## 2022-09-08 RX ORDER — FENTANYL CITRATE 50 UG/ML
INJECTION, SOLUTION INTRAMUSCULAR; INTRAVENOUS AS NEEDED
Status: DISCONTINUED | OUTPATIENT
Start: 2022-09-08 | End: 2022-09-08 | Stop reason: SURG

## 2022-09-08 RX ORDER — SODIUM CHLORIDE 9 MG/ML
150 INJECTION, SOLUTION INTRAVENOUS CONTINUOUS
Status: DISCONTINUED | OUTPATIENT
Start: 2022-09-08 | End: 2022-09-08

## 2022-09-08 RX ORDER — PROCHLORPERAZINE MALEATE 10 MG
10 TABLET ORAL EVERY 6 HOURS PRN
Status: DISCONTINUED | OUTPATIENT
Start: 2022-09-08 | End: 2022-09-09 | Stop reason: HOSPADM

## 2022-09-08 RX ORDER — DIPHENHYDRAMINE HYDROCHLORIDE 50 MG/ML
25 INJECTION INTRAMUSCULAR; INTRAVENOUS EVERY 4 HOURS PRN
Status: DISCONTINUED | OUTPATIENT
Start: 2022-09-08 | End: 2022-09-09 | Stop reason: HOSPADM

## 2022-09-08 RX ORDER — CYANOCOBALAMIN 1000 UG/ML
1000 INJECTION, SOLUTION INTRAMUSCULAR; SUBCUTANEOUS ONCE
Status: COMPLETED | OUTPATIENT
Start: 2022-09-09 | End: 2022-09-09

## 2022-09-08 RX ORDER — CETIRIZINE HYDROCHLORIDE 10 MG/1
10 TABLET ORAL DAILY
Status: DISCONTINUED | OUTPATIENT
Start: 2022-09-09 | End: 2022-09-09 | Stop reason: HOSPADM

## 2022-09-08 RX ORDER — HYDROMORPHONE HYDROCHLORIDE 1 MG/ML
0.5 INJECTION, SOLUTION INTRAMUSCULAR; INTRAVENOUS; SUBCUTANEOUS
Status: DISCONTINUED | OUTPATIENT
Start: 2022-09-08 | End: 2022-09-08 | Stop reason: SDUPTHER

## 2022-09-08 RX ORDER — SODIUM CHLORIDE 9 MG/ML
150 INJECTION, SOLUTION INTRAVENOUS CONTINUOUS
Status: DISCONTINUED | OUTPATIENT
Start: 2022-09-08 | End: 2022-09-09 | Stop reason: HOSPADM

## 2022-09-08 RX ORDER — SODIUM CHLORIDE, SODIUM LACTATE, POTASSIUM CHLORIDE, CALCIUM CHLORIDE 600; 310; 30; 20 MG/100ML; MG/100ML; MG/100ML; MG/100ML
9 INJECTION, SOLUTION INTRAVENOUS CONTINUOUS
Status: CANCELLED | OUTPATIENT
Start: 2022-09-08

## 2022-09-08 RX ORDER — INSULIN LISPRO 100 [IU]/ML
7 INJECTION, SOLUTION INTRAVENOUS; SUBCUTANEOUS ONCE
Status: COMPLETED | OUTPATIENT
Start: 2022-09-08 | End: 2022-09-08

## 2022-09-08 RX ORDER — FENOFIBRATE 145 MG/1
145 TABLET, COATED ORAL NIGHTLY
Refills: 3 | Status: DISCONTINUED | OUTPATIENT
Start: 2022-09-08 | End: 2022-09-09 | Stop reason: HOSPADM

## 2022-09-08 RX ORDER — METOCLOPRAMIDE HYDROCHLORIDE 5 MG/ML
10 INJECTION INTRAMUSCULAR; INTRAVENOUS EVERY 6 HOURS PRN
Status: DISCONTINUED | OUTPATIENT
Start: 2022-09-08 | End: 2022-09-09 | Stop reason: HOSPADM

## 2022-09-08 RX ORDER — PROMETHAZINE HYDROCHLORIDE 12.5 MG/1
12.5 TABLET ORAL EVERY 6 HOURS PRN
Status: DISCONTINUED | OUTPATIENT
Start: 2022-09-08 | End: 2022-09-09 | Stop reason: HOSPADM

## 2022-09-08 RX ORDER — SCOLOPAMINE TRANSDERMAL SYSTEM 1 MG/1
1 PATCH, EXTENDED RELEASE TRANSDERMAL ONCE
Status: DISCONTINUED | OUTPATIENT
Start: 2022-09-08 | End: 2022-09-08

## 2022-09-08 RX ORDER — ROCURONIUM BROMIDE 10 MG/ML
INJECTION, SOLUTION INTRAVENOUS AS NEEDED
Status: DISCONTINUED | OUTPATIENT
Start: 2022-09-08 | End: 2022-09-08 | Stop reason: SURG

## 2022-09-08 RX ORDER — ENOXAPARIN SODIUM 100 MG/ML
INJECTION SUBCUTANEOUS AS NEEDED
Status: DISCONTINUED | OUTPATIENT
Start: 2022-09-08 | End: 2022-09-08 | Stop reason: HOSPADM

## 2022-09-08 RX ORDER — LEVOFLOXACIN 5 MG/ML
500 INJECTION, SOLUTION INTRAVENOUS EVERY 24 HOURS
Status: COMPLETED | OUTPATIENT
Start: 2022-09-08 | End: 2022-09-08

## 2022-09-08 RX ORDER — LORAZEPAM 2 MG/ML
0.5 INJECTION INTRAMUSCULAR EVERY 12 HOURS PRN
Status: DISCONTINUED | OUTPATIENT
Start: 2022-09-08 | End: 2022-09-09 | Stop reason: HOSPADM

## 2022-09-08 RX ORDER — NALOXONE HCL 0.4 MG/ML
0.4 VIAL (ML) INJECTION
Status: DISCONTINUED | OUTPATIENT
Start: 2022-09-08 | End: 2022-09-09 | Stop reason: HOSPADM

## 2022-09-08 RX ORDER — MAGNESIUM HYDROXIDE 1200 MG/15ML
LIQUID ORAL AS NEEDED
Status: DISCONTINUED | OUTPATIENT
Start: 2022-09-08 | End: 2022-09-08 | Stop reason: HOSPADM

## 2022-09-08 RX ORDER — SIMETHICONE 80 MG
TABLET,CHEWABLE ORAL
Status: COMPLETED
Start: 2022-09-08 | End: 2022-09-08

## 2022-09-08 RX ORDER — LISINOPRIL 40 MG/1
40 TABLET ORAL DAILY
Status: DISCONTINUED | OUTPATIENT
Start: 2022-09-08 | End: 2022-09-09 | Stop reason: HOSPADM

## 2022-09-08 RX ORDER — DEXAMETHASONE SODIUM PHOSPHATE 10 MG/ML
INJECTION, SOLUTION INTRAMUSCULAR; INTRAVENOUS
Status: COMPLETED | OUTPATIENT
Start: 2022-09-08 | End: 2022-09-08

## 2022-09-08 RX ORDER — ACETAMINOPHEN 500 MG
1000 TABLET ORAL EVERY 8 HOURS SCHEDULED
Status: DISCONTINUED | OUTPATIENT
Start: 2022-09-08 | End: 2022-09-09 | Stop reason: HOSPADM

## 2022-09-08 RX ORDER — SODIUM CHLORIDE 0.9 % (FLUSH) 0.9 %
10 SYRINGE (ML) INJECTION EVERY 12 HOURS SCHEDULED
Status: CANCELLED | OUTPATIENT
Start: 2022-09-08

## 2022-09-08 RX ORDER — SODIUM CHLORIDE 0.9 % (FLUSH) 0.9 %
10 SYRINGE (ML) INJECTION AS NEEDED
Status: CANCELLED | OUTPATIENT
Start: 2022-09-08

## 2022-09-08 RX ORDER — BUPIVACAINE HYDROCHLORIDE 2.5 MG/ML
INJECTION, SOLUTION EPIDURAL; INFILTRATION; INTRACAUDAL
Status: COMPLETED | OUTPATIENT
Start: 2022-09-08 | End: 2022-09-08

## 2022-09-08 RX ORDER — FIBRINOGEN HUMAN AND THROMBIN HUMAN 10 ML
KIT TOPICAL AS NEEDED
Status: DISCONTINUED | OUTPATIENT
Start: 2022-09-08 | End: 2022-09-08 | Stop reason: HOSPADM

## 2022-09-08 RX ORDER — PANTOPRAZOLE SODIUM 40 MG/10ML
40 INJECTION, POWDER, LYOPHILIZED, FOR SOLUTION INTRAVENOUS
Status: DISCONTINUED | OUTPATIENT
Start: 2022-09-09 | End: 2022-09-09 | Stop reason: HOSPADM

## 2022-09-08 RX ORDER — SODIUM CHLORIDE 0.9 % (FLUSH) 0.9 %
3-10 SYRINGE (ML) INJECTION AS NEEDED
Status: DISCONTINUED | OUTPATIENT
Start: 2022-09-08 | End: 2022-09-08 | Stop reason: HOSPADM

## 2022-09-08 RX ORDER — HYDROMORPHONE HYDROCHLORIDE 1 MG/ML
0.5 INJECTION, SOLUTION INTRAMUSCULAR; INTRAVENOUS; SUBCUTANEOUS
Status: DISCONTINUED | OUTPATIENT
Start: 2022-09-08 | End: 2022-09-08 | Stop reason: HOSPADM

## 2022-09-08 RX ORDER — ENOXAPARIN SODIUM 100 MG/ML
40 INJECTION SUBCUTANEOUS ONCE
Status: DISCONTINUED | OUTPATIENT
Start: 2022-09-08 | End: 2022-09-08 | Stop reason: HOSPADM

## 2022-09-08 RX ORDER — LORAZEPAM 1 MG/1
1 TABLET ORAL EVERY 12 HOURS PRN
Status: DISCONTINUED | OUTPATIENT
Start: 2022-09-08 | End: 2022-09-09 | Stop reason: HOSPADM

## 2022-09-08 RX ORDER — INSULIN LISPRO 100 [IU]/ML
INJECTION, SOLUTION INTRAVENOUS; SUBCUTANEOUS
Status: COMPLETED
Start: 2022-09-08 | End: 2022-09-08

## 2022-09-08 RX ORDER — GABAPENTIN 100 MG/1
100 CAPSULE ORAL 3 TIMES DAILY
Status: DISCONTINUED | OUTPATIENT
Start: 2022-09-08 | End: 2022-09-09 | Stop reason: HOSPADM

## 2022-09-08 RX ORDER — DEXAMETHASONE SODIUM PHOSPHATE 10 MG/ML
INJECTION, SOLUTION INTRAMUSCULAR; INTRAVENOUS AS NEEDED
Status: DISCONTINUED | OUTPATIENT
Start: 2022-09-08 | End: 2022-09-08 | Stop reason: SURG

## 2022-09-08 RX ORDER — DEXTROSE MONOHYDRATE 25 G/50ML
25 INJECTION, SOLUTION INTRAVENOUS
Status: DISCONTINUED | OUTPATIENT
Start: 2022-09-08 | End: 2022-09-08 | Stop reason: SDUPTHER

## 2022-09-08 RX ORDER — ACETAMINOPHEN 160 MG/5ML
1000 SOLUTION ORAL EVERY 8 HOURS SCHEDULED
Status: DISCONTINUED | OUTPATIENT
Start: 2022-09-08 | End: 2022-09-09 | Stop reason: HOSPADM

## 2022-09-08 RX ORDER — PROPOFOL 10 MG/ML
VIAL (ML) INTRAVENOUS AS NEEDED
Status: DISCONTINUED | OUTPATIENT
Start: 2022-09-08 | End: 2022-09-08 | Stop reason: SURG

## 2022-09-08 RX ORDER — ACETAMINOPHEN 500 MG
1000 TABLET ORAL ONCE
Status: COMPLETED | OUTPATIENT
Start: 2022-09-08 | End: 2022-09-08

## 2022-09-08 RX ORDER — ALLOPURINOL 300 MG/1
300 TABLET ORAL DAILY
Status: DISCONTINUED | OUTPATIENT
Start: 2022-09-08 | End: 2022-09-09 | Stop reason: HOSPADM

## 2022-09-08 RX ORDER — FENTANYL CITRATE 50 UG/ML
50 INJECTION, SOLUTION INTRAMUSCULAR; INTRAVENOUS
Status: DISCONTINUED | OUTPATIENT
Start: 2022-09-08 | End: 2022-09-08 | Stop reason: SDUPTHER

## 2022-09-08 RX ORDER — ONDANSETRON 4 MG/1
4 TABLET, FILM COATED ORAL EVERY 4 HOURS PRN
Status: DISCONTINUED | OUTPATIENT
Start: 2022-09-08 | End: 2022-09-08 | Stop reason: SDUPTHER

## 2022-09-08 RX ORDER — HYDROMORPHONE HYDROCHLORIDE 2 MG/1
2 TABLET ORAL EVERY 4 HOURS PRN
Status: DISCONTINUED | OUTPATIENT
Start: 2022-09-08 | End: 2022-09-09 | Stop reason: HOSPADM

## 2022-09-08 RX ORDER — GABAPENTIN 300 MG/1
600 CAPSULE ORAL ONCE
Status: COMPLETED | OUTPATIENT
Start: 2022-09-08 | End: 2022-09-08

## 2022-09-08 RX ORDER — DEXTROSE MONOHYDRATE 25 G/50ML
25 INJECTION, SOLUTION INTRAVENOUS
Status: DISCONTINUED | OUTPATIENT
Start: 2022-09-08 | End: 2022-09-09 | Stop reason: HOSPADM

## 2022-09-08 RX ORDER — LIDOCAINE HYDROCHLORIDE 10 MG/ML
0.5 INJECTION, SOLUTION EPIDURAL; INFILTRATION; INTRACAUDAL; PERINEURAL ONCE AS NEEDED
Status: COMPLETED | OUTPATIENT
Start: 2022-09-08 | End: 2022-09-08

## 2022-09-08 RX ORDER — LIDOCAINE HYDROCHLORIDE 10 MG/ML
INJECTION, SOLUTION EPIDURAL; INFILTRATION; INTRACAUDAL; PERINEURAL AS NEEDED
Status: DISCONTINUED | OUTPATIENT
Start: 2022-09-08 | End: 2022-09-08 | Stop reason: SURG

## 2022-09-08 RX ORDER — PRAVASTATIN SODIUM 40 MG
40 TABLET ORAL NIGHTLY
Status: DISCONTINUED | OUTPATIENT
Start: 2022-09-08 | End: 2022-09-09 | Stop reason: HOSPADM

## 2022-09-08 RX ORDER — SODIUM CHLORIDE 0.9 % (FLUSH) 0.9 %
3 SYRINGE (ML) INJECTION EVERY 12 HOURS SCHEDULED
Status: DISCONTINUED | OUTPATIENT
Start: 2022-09-08 | End: 2022-09-08 | Stop reason: HOSPADM

## 2022-09-08 RX ORDER — LEVOFLOXACIN 5 MG/ML
500 INJECTION, SOLUTION INTRAVENOUS EVERY 24 HOURS
Status: COMPLETED | OUTPATIENT
Start: 2022-09-09 | End: 2022-09-09

## 2022-09-08 RX ORDER — METOPROLOL SUCCINATE 25 MG/1
25 TABLET, EXTENDED RELEASE ORAL DAILY
Status: DISCONTINUED | OUTPATIENT
Start: 2022-09-08 | End: 2022-09-09 | Stop reason: HOSPADM

## 2022-09-08 RX ORDER — FENTANYL CITRATE 50 UG/ML
INJECTION, SOLUTION INTRAMUSCULAR; INTRAVENOUS
Status: COMPLETED
Start: 2022-09-08 | End: 2022-09-08

## 2022-09-08 RX ORDER — MORPHINE SULFATE 4 MG/ML
4 INJECTION, SOLUTION INTRAMUSCULAR; INTRAVENOUS
Status: DISCONTINUED | OUTPATIENT
Start: 2022-09-08 | End: 2022-09-09 | Stop reason: HOSPADM

## 2022-09-08 RX ORDER — ONDANSETRON 2 MG/ML
4 INJECTION INTRAMUSCULAR; INTRAVENOUS EVERY 4 HOURS PRN
Status: DISCONTINUED | OUTPATIENT
Start: 2022-09-08 | End: 2022-09-09 | Stop reason: HOSPADM

## 2022-09-08 RX ORDER — INSULIN LISPRO 100 [IU]/ML
0-7 INJECTION, SOLUTION INTRAVENOUS; SUBCUTANEOUS
Status: DISCONTINUED | OUTPATIENT
Start: 2022-09-08 | End: 2022-09-09 | Stop reason: HOSPADM

## 2022-09-08 RX ORDER — SIMETHICONE 80 MG
80 TABLET,CHEWABLE ORAL 4 TIMES DAILY PRN
Status: DISCONTINUED | OUTPATIENT
Start: 2022-09-08 | End: 2022-09-09 | Stop reason: HOSPADM

## 2022-09-08 RX ORDER — FENTANYL CITRATE 50 UG/ML
50 INJECTION, SOLUTION INTRAMUSCULAR; INTRAVENOUS
Status: DISCONTINUED | OUTPATIENT
Start: 2022-09-08 | End: 2022-09-08 | Stop reason: HOSPADM

## 2022-09-08 RX ORDER — PANTOPRAZOLE SODIUM 40 MG/10ML
40 INJECTION, POWDER, LYOPHILIZED, FOR SOLUTION INTRAVENOUS ONCE
Status: COMPLETED | OUTPATIENT
Start: 2022-09-08 | End: 2022-09-08

## 2022-09-08 RX ORDER — SODIUM CHLORIDE AND POTASSIUM CHLORIDE 150; 450 MG/100ML; MG/100ML
125 INJECTION, SOLUTION INTRAVENOUS CONTINUOUS
Status: DISCONTINUED | OUTPATIENT
Start: 2022-09-09 | End: 2022-09-09 | Stop reason: HOSPADM

## 2022-09-08 RX ORDER — NALOXONE HCL 0.4 MG/ML
0.1 VIAL (ML) INJECTION
Status: DISCONTINUED | OUTPATIENT
Start: 2022-09-08 | End: 2022-09-09 | Stop reason: HOSPADM

## 2022-09-08 RX ORDER — GABAPENTIN 250 MG/5ML
100 SOLUTION ORAL 3 TIMES DAILY
Status: DISCONTINUED | OUTPATIENT
Start: 2022-09-08 | End: 2022-09-09 | Stop reason: HOSPADM

## 2022-09-08 RX ORDER — ENOXAPARIN SODIUM 100 MG/ML
40 INJECTION SUBCUTANEOUS DAILY
Status: DISCONTINUED | OUTPATIENT
Start: 2022-09-09 | End: 2022-09-09 | Stop reason: HOSPADM

## 2022-09-08 RX ORDER — BUPIVACAINE HYDROCHLORIDE AND EPINEPHRINE 5; 5 MG/ML; UG/ML
INJECTION, SOLUTION PERINEURAL AS NEEDED
Status: DISCONTINUED | OUTPATIENT
Start: 2022-09-08 | End: 2022-09-08 | Stop reason: HOSPADM

## 2022-09-08 RX ORDER — ONDANSETRON 2 MG/ML
4 INJECTION INTRAMUSCULAR; INTRAVENOUS ONCE AS NEEDED
Status: DISCONTINUED | OUTPATIENT
Start: 2022-09-08 | End: 2022-09-08 | Stop reason: SDUPTHER

## 2022-09-08 RX ADMIN — SIMETHICONE 80 MG: 80 TABLET, CHEWABLE ORAL at 13:44

## 2022-09-08 RX ADMIN — INSULIN LISPRO 4 UNITS: 100 INJECTION, SOLUTION INTRAVENOUS; SUBCUTANEOUS at 18:22

## 2022-09-08 RX ADMIN — INSULIN LISPRO 7 UNITS: 100 INJECTION, SOLUTION INTRAVENOUS; SUBCUTANEOUS at 14:08

## 2022-09-08 RX ADMIN — PANTOPRAZOLE SODIUM 40 MG: 40 INJECTION, POWDER, FOR SOLUTION INTRAVENOUS at 11:00

## 2022-09-08 RX ADMIN — FENTANYL CITRATE 50 MCG: 50 INJECTION INTRAMUSCULAR; INTRAVENOUS at 13:21

## 2022-09-08 RX ADMIN — ACETAMINOPHEN 1000 MG: 500 TABLET, FILM COATED ORAL at 10:55

## 2022-09-08 RX ADMIN — HYDROMORPHONE HYDROCHLORIDE 0.5 MG: 1 INJECTION, SOLUTION INTRAMUSCULAR; INTRAVENOUS; SUBCUTANEOUS at 13:37

## 2022-09-08 RX ADMIN — FENOFIBRATE 145 MG: 145 TABLET ORAL at 21:34

## 2022-09-08 RX ADMIN — PROPOFOL 20 MG: 10 INJECTION, EMULSION INTRAVENOUS at 12:41

## 2022-09-08 RX ADMIN — GLUCAGON HYDROCHLORIDE 1 MG: KIT at 11:53

## 2022-09-08 RX ADMIN — GABAPENTIN 100 MG: 100 CAPSULE ORAL at 15:55

## 2022-09-08 RX ADMIN — FENTANYL CITRATE 50 MCG: 50 INJECTION, SOLUTION INTRAMUSCULAR; INTRAVENOUS at 13:21

## 2022-09-08 RX ADMIN — FENTANYL CITRATE 100 MCG: 50 INJECTION, SOLUTION INTRAMUSCULAR; INTRAVENOUS at 11:17

## 2022-09-08 RX ADMIN — ROCURONIUM BROMIDE 50 MG: 50 INJECTION, SOLUTION INTRAVENOUS at 11:34

## 2022-09-08 RX ADMIN — SODIUM CHLORIDE: 9 INJECTION, SOLUTION INTRAVENOUS at 12:30

## 2022-09-08 RX ADMIN — LIDOCAINE HYDROCHLORIDE 0.2 ML: 10 INJECTION, SOLUTION EPIDURAL; INFILTRATION; INTRACAUDAL; PERINEURAL at 10:38

## 2022-09-08 RX ADMIN — LIDOCAINE HYDROCHLORIDE 50 MG: 10 INJECTION, SOLUTION EPIDURAL; INFILTRATION; INTRACAUDAL; PERINEURAL at 11:17

## 2022-09-08 RX ADMIN — LEVOFLOXACIN 500 MG: 5 INJECTION, SOLUTION INTRAVENOUS at 11:17

## 2022-09-08 RX ADMIN — AMISULPRIDE 10 MG: 2.5 INJECTION, SOLUTION INTRAVENOUS at 12:30

## 2022-09-08 RX ADMIN — SODIUM CHLORIDE: 9 INJECTION, SOLUTION INTRAVENOUS at 11:17

## 2022-09-08 RX ADMIN — CHLORHEXIDINE GLUCONATE 0.12% ORAL RINSE 15 ML: 1.2 LIQUID ORAL at 10:58

## 2022-09-08 RX ADMIN — FENTANYL CITRATE 100 MCG: 50 INJECTION, SOLUTION INTRAMUSCULAR; INTRAVENOUS at 12:44

## 2022-09-08 RX ADMIN — ONDANSETRON 4 MG: 2 INJECTION INTRAMUSCULAR; INTRAVENOUS at 11:17

## 2022-09-08 RX ADMIN — SCOPALAMINE 1 PATCH: 1 PATCH, EXTENDED RELEASE TRANSDERMAL at 10:56

## 2022-09-08 RX ADMIN — SUGAMMADEX 200 MG: 100 INJECTION, SOLUTION INTRAVENOUS at 12:38

## 2022-09-08 RX ADMIN — SODIUM CHLORIDE 1000 ML: 9 INJECTION, SOLUTION INTRAVENOUS at 10:38

## 2022-09-08 RX ADMIN — ROCURONIUM BROMIDE 50 MG: 50 INJECTION, SOLUTION INTRAVENOUS at 11:17

## 2022-09-08 RX ADMIN — DEXAMETHASONE SODIUM PHOSPHATE 6 MG: 10 INJECTION, SOLUTION INTRAMUSCULAR; INTRAVENOUS at 11:17

## 2022-09-08 RX ADMIN — SODIUM CHLORIDE 150 ML/HR: 9 INJECTION, SOLUTION INTRAVENOUS at 18:30

## 2022-09-08 RX ADMIN — ACETAMINOPHEN 1000 MG: 500 TABLET, FILM COATED ORAL at 21:33

## 2022-09-08 RX ADMIN — GABAPENTIN 100 MG: 100 CAPSULE ORAL at 21:34

## 2022-09-08 RX ADMIN — SIMETHICONE 80 MG: 80 TABLET, CHEWABLE ORAL at 21:41

## 2022-09-08 RX ADMIN — ALLOPURINOL 300 MG: 300 TABLET ORAL at 15:55

## 2022-09-08 RX ADMIN — PRAVASTATIN SODIUM 40 MG: 40 TABLET ORAL at 21:34

## 2022-09-08 RX ADMIN — Medication 10 ML: at 11:00

## 2022-09-08 RX ADMIN — LISINOPRIL 40 MG: 40 TABLET ORAL at 15:55

## 2022-09-08 RX ADMIN — BUPIVACAINE HYDROCHLORIDE 60 ML: 2.5 INJECTION, SOLUTION EPIDURAL; INFILTRATION; INTRACAUDAL; PERINEURAL at 11:27

## 2022-09-08 RX ADMIN — PROPOFOL 250 MG: 10 INJECTION, EMULSION INTRAVENOUS at 11:17

## 2022-09-08 RX ADMIN — CHLORHEXIDINE GLUCONATE 0.12% ORAL RINSE 15 ML: 1.2 LIQUID ORAL at 11:02

## 2022-09-08 RX ADMIN — DEXAMETHASONE SODIUM PHOSPHATE 4 MG: 10 INJECTION, SOLUTION INTRAMUSCULAR; INTRAVENOUS at 11:27

## 2022-09-08 RX ADMIN — GABAPENTIN 600 MG: 300 CAPSULE ORAL at 10:55

## 2022-09-08 RX ADMIN — ACETAMINOPHEN 1000 MG: 500 TABLET, FILM COATED ORAL at 15:55

## 2022-09-08 NOTE — ANESTHESIA PREPROCEDURE EVALUATION
Anesthesia Evaluation     Patient summary reviewed and Nursing notes reviewed                Airway   Mallampati: II  TM distance: >3 FB  Neck ROM: full  No difficulty expected  Dental - normal exam     Pulmonary - normal exam   (+) sleep apnea on CPAP,   Cardiovascular - normal exam    (+) hypertension, hyperlipidemia,       Neuro/Psych- negative ROS  GI/Hepatic/Renal/Endo    (+) morbid obesity, GERD,  renal disease CRI and stones, diabetes mellitus, thyroid problem hypothyroidism    Musculoskeletal     Abdominal  - normal exam    Bowel sounds: normal.   Substance History - negative use     OB/GYN negative ob/gyn ROS         Other   arthritis,                      Anesthesia Plan    ASA 3     general     (TAPS FOR POSTOP PAIN)  intravenous induction     Anesthetic plan, risks, benefits, and alternatives have been provided, discussed and informed consent has been obtained with: patient.    Plan discussed with CRNA.        CODE STATUS:

## 2022-09-08 NOTE — OP NOTE
Preoperative Diagnosis:   Morbid obesity (128 kg, BMI 48.49)    with Multiple Co-Morbidities, hiatal hernia    Postoperative Diagnosis:   Same    Procedure:                                                      Laparoscopic Sleeve Gastrectomy (85% subtotal vertical gastrectomy, Titan ([id number])                                  Laparoscopic hiatal hernia repair (not paraesophageal)                                                                                                                     EGD                                                                       Surgeon:                                                       GALINDO Salgado MD    Anesthesia:                                                   GERHARD    EBL:                                                              Minimal    Fluids:                                                           Crystalloid    Specimens:                                                   Subtotal gastrectomy    Drains:                                                           None    Counts:                                                          Correct    Complications:                                               None    Indications:   This is a 41 year-old morbidly obese female who presents for elective laparoscopic sleeve gastrectomy, hiatal hernia repair, and EGD.  She's undergone our extensive preoperative education teaching and consent process everything's in order and she wishes to proceed.      Operative technique:     The patient was brought to the operating room, and placed supine upon the operating room table. SCD hose were placed, she underwent uneventful general endotracheal anesthesia per the anesthesiology staff, she received IV Levaquin, and subcutaneous Lovenox, the anesthesiology staff performed a tap block, and her abdomen was prepped and draped with ChloraPrep in a sterile fashion, an Ioban was used as well, a Smith catheter was not  placed.    The peritoneal cavity was entered in the left upper quadrant using an 11 mm fascial splitting trocar utilizing an Optiview technique and the abdomen was insufflated to a pressure of 15 mmHg with CO2 gas.  Exploratory laparoscopy revealed no evidence of injury from the entrance technique, a very enlarged smooth fatty appearing liver without nodularity that extended several centimeters below the left costal margin and extended well over the spleen, a mild to moderate rectus diastases, status postcholecystectomy.    Remaining trocars were placed under direct visualization including 5 mm trochars in the right, mid, and left lateral abdomen and after infiltration of the peritoneum with local anesthetic under direct visualization a 19 mm trocar was placed above and to the right of the umbilicus off the midline away from the rectus diastases.    Through a stab incision in the epigastrium a Maggie retractor was used to elevate portions of the left lobe of the liver.  Very large anterior fat pad but no obvious hiatal hernia from the anterior view and photodocumentation obtained.  Beginning approximately two thirds of the way around the greater curvature the stomach, the gastrocolic vessels were divided using the Enseal device.  This proceeded proximally taking down all the short gastric vessels and exposing the left vika.  There appeared to be excessive fat within the hiatus creating a hiatal hernia and photodocumentation obtained.    The hernia sac was incised along the base of the left vika and extended up and across the phrenoesophageal membrane.  The pars flaccida was divided, there was not a replaced hepatic vessel.  The hernia sac was incised along the base of the right vika and also extended up and across the phrenoesophageal membrane.  The hernia sac and its contents were dissected out of the mediastinum and reduced below the level of the crura.  There was not a paraesophageal component.  A latex free  drain was used temporarily for esophageal retraction.  The GE junction was lengthened to well below the level of the crura by dissecting loose areolar tissue well into the mediastinum.  The crura were dissected to their meeting point inferiorly.  The hiatal hernia repair was deferred until after subtotal sleeve gastrectomy.    1 mg glucagon IV given.  Gastrocolic vessels were then divided medially to a few cm proximal to the pylorus.  Adhesions of the posterior stomach to the pancreas and retroperitoneum were divided.  The stomach was marked with a Kitner saturated with a marking pen 1 cm lateral to the angle of His, 3 cm away from the angularis, and 6 cm from the pylorus.  A 38 Spanish balloon bougie was advanced into the distal antrum and the balloon insufflated with 15 cc of saline.    The Titan stapler was positioned along the markings with the calibration balloon at the marking 3 cm from the angularis and the stapler was closed.  The calibration bougie was desufflated and removed.  The 85% subtotal vertical sleeve gastrectomy was then performed with a single firing using the Titan stapler ([id number]).    The Titan stapler was removed.  The subtotal gastrectomy specimen was retrieved through the 19 mm trocar site incision, inspected, and sent unopened to pathology for permanent section.  It was a slightly larger than average size specimen.  The sleeve was submerged under saline.  Upper endoscopy was performed, and the endoscope was advanced into the duodenal bulb.  No air bubbles or leak seen, no bleeding at the staple line, no narrowing at the angularis, no pyloric spasm or deformity, no gastritis but thick rugal folds noted, no hiatal hernia or Caban's esophagus, Z-line approximately 39/40 cm, and the endoscope was withdrawn.  Endoscopic photodocumentation obtained of the GE junction and widely patent pylorus.  Irrigation fluid was suctioned free.  The hiatal hernia repair was now performed posteriorly using a  running nonabsorbable 2-0  V-Loc suture with good result, photodocumentation obtained before and after repair.The sleeve was resting nicely and hemostatic.  The sleeve staple line were treated with 10 cc of aerosolized Tisseel fibrin glue.  Photodocumentation of the sleeve obtained before and after endoscopy.  The Maggie retractor was removed.  10 mg Barhemsys IV given.  Fascia at the 19 mm trocar site incision was closed with a horizontal mattress 0 Vicryl suture placed with a suture passer under direct visualization and tying the knot extracorporeally.  This was further reinforced with an additional interrupted 0 Vicryl suture placed in a similar fashion.  Remaining trocars were removed under direct visualization, no bleeding noted from their sites.  Skin in each incision was closed using 3-0 Monocryl plus in an interrupted subcuticular stitch followed by skin glue.  The patient tolerated the procedure well without complication, was taken to the recovery room in stable condition.

## 2022-09-08 NOTE — ANESTHESIA POSTPROCEDURE EVALUATION
Patient: Natty Fuentes    Procedure Summary     Date: 09/08/22 [facility] / [facility]    Anesthesia Start: 1113 Anesthesia Stop: 1305    Procedures:       GASTRIC SLEEVE LAPAROSCOPIC (N/A Abdomen)      HIATAL HERNIA REPAIR LAPAROSCOPIC (N/A Abdomen)      ESOPHAGOGASTRODUODENOSCOPY (N/A Esophagus) Diagnosis:       Morbid obesity with body mass index of 45.0-49.9 in adult (HCC)      Hiatal hernia with gastroesophageal reflux      (Morbid obesity with body mass index of 45.0-49.9 in adult (HCC) [E66.01, Z68.42])      (Hiatal hernia with gastroesophageal reflux [K44.9, K21.9])    Surgeons: Evans Salgado MD Provider: Darren Lazo MD    Anesthesia Type: general ASA Status: 3          Anesthesia Type: general    Vitals  Vitals Value Taken Time   BP     Temp     Pulse     Resp     SpO2 92 % 09/08/22 1305           Post Anesthesia Care and Evaluation    Patient location during evaluation: PACU  Patient participation: complete - patient participated  Level of consciousness: awake and alert  Pain management: adequate    Airway patency: patent  Anesthetic complications: No anesthetic complications  PONV Status: none  Cardiovascular status: hemodynamically stable and acceptable  Respiratory status: nonlabored ventilation, acceptable and nasal cannula  Hydration status: acceptable

## 2022-09-08 NOTE — ANESTHESIA PROCEDURE NOTES
Peripheral Block      Patient reassessed immediately prior to procedure    Patient location during procedure: OR  Reason for block: at surgeon's request and post-op pain management  Performed by  CRNA/CAA: Bowen Joseph, CRNA  Preanesthetic Checklist  Completed: patient identified, IV checked, site marked, risks and benefits discussed, surgical consent, monitors and equipment checked, pre-op evaluation and timeout performed  Prep:  Pt Position: supine  Sterile barriers:cap, gloves, sterile barriers and mask  Prep: ChloraPrep  Patient monitoring: blood pressure monitoring, continuous pulse oximetry and EKG  Procedure    Sedation: yes  Performed under: general  Guidance:ultrasound guided  Images:still images obtained, printed/placed on chart    Laterality:Bilateral  Block Type:TAP  Injection Technique:single-shot  Needle Type:short-bevel and echogenic  Needle Gauge:20 G  Resistance on Injection: none    Medications Used: dexamethasone sodium phosphate injection, 4 mg  bupivacaine PF (MARCAINE) 0.25 % injection, 60 mL  Med administered at 9/8/2022 11:27 AM      Medications  Comment:Block Injection:  LA dose divided between Right and Left block        Post Assessment  Injection Assessment: negative aspiration for heme, incremental injection and no paresthesia on injection  Patient Tolerance:comfortable throughout block  Complications:no  Additional Notes      Under Ultrasound guidance, a BBraun 4inch 360 degree needle was advanced with Normal Saline hydro dissection of tissue.  The Internal Oblique and Transversus Abdominus muscles where visualized.  At or before the aponeurosis of Internal Oblique, local anesthetic spread was visualized in the Transversus Abdominus Plane. Injection was made incrementally with aspiration every 5 mls.  There was no  intravascular injection,  injection pressure was normal, there was no neural injection, and the procedure was completed without difficulty.  Thank You.

## 2022-09-08 NOTE — ANESTHESIA PROCEDURE NOTES
Airway  Urgency: elective    Date/Time: 9/8/2022 11:20 AM  Airway not difficult    General Information and Staff    Patient location during procedure: OR  CRNA/CAA: Bowen Joseph, PADMINI    Indications and Patient Condition  Indications for airway management: airway protection    Preoxygenated: yes  MILS not maintained throughout  Mask difficulty assessment: 2 - vent by mask + OA or adjuvant +/- NMBA    Final Airway Details  Final airway type: endotracheal airway      Successful airway: ETT  Cuffed: yes   Successful intubation technique: video laryngoscopy  Facilitating devices/methods: Bougie  Endotracheal tube insertion site: oral  Blade: Phelps  Blade size: 3  ETT size (mm): 7.5  Cormack-Lehane Classification: grade I - full view of glottis  Placement verified by: chest auscultation and capnometry   Measured from: lips  ETT/EBT  to lips (cm): 20  Number of attempts at approach: 1  Assessment: lips, teeth, and gum same as pre-op and atraumatic intubation    Additional Comments  Negative epigastric sounds, Breath sound equal bilaterally with symmetric chest rise and fall

## 2022-09-08 NOTE — PLAN OF CARE
Goal Outcome Evaluation:  Plan of Care Reviewed With: patient        Progress: no change   VSS, 2LNC, A/Ox4. Pt has voided and ambulated. No complaints of pain at this time. Will continue plan of care

## 2022-09-08 NOTE — H&P
Date of Visit: 9/8/22     Chief Complaint:  weight gain; unable to maintain weight loss.   Evaluate for possible metabolic and bariatric surgery     History of Present Illness:  Natty Fuentes is a 41 y.o. female who presents today for evaluation, education and consultation regarding metabolic and bariatric surgery (MBS).  Since last seen 4/20/2022 she has gained 2-1/2 pounds.  The patient returns for final visit prior to metabolic and bariatric surgery specifically the sleeve gastrectomy.  Original intake evaluation Parris Sanchez PA-C dated 4/14/2021 ([facility]) reviewed.  She noted the patient's maximum lifetime weight is 280 pounds and that she has hypertension hyperlipidemia peripheral edema insulin-dependent diabetes mellitus diagnosed in 2001 on insulin since January 2021 last hemoglobin A1c 6.8 sees endocrine sleep apnea CPAP compliant history of renal calculi hypoparathyroidism on calcium and vitamin D supplements chronic kidney disease arthritis gout elevated CRP and sed rate sees a rheumatologist with questionable autoimmune disease has prednisone as needed to take for flares last 2019 chronic reflux controlled on Protonix 40 mg daily EGD in 2017 with reported ulcer and Schatzki's ring status post cholecystectomy for gallstones.      The patient has had issues with morbid obesity for years and only temporary success with non-surgical methods of weight loss.  The patient is seeking LSG to help with the morbid obesity related conditions of hiatal hernia with GE reflux, arthritis, elevated CRP and sed rate questionable autoimmune disease, chronic kidney disease, fatigue, gout, hyperlipidemia, hypertension, hypocalcemia, hypoparathyroidism, peripheral edema, nephrolithiasis, obstructive sleep apnea on CPAP, type 2 insulin-dependent diabetes mellitus, gastroparesis.     41-year-old morbidly obese female from [place].  She is worried she may not lose weight with the sleeve.  She says her sister only lost 40 pounds  after her sleeve and that her [place] surgeon Dr. Adan blamed on her using Genepro as her protein source.  She said I did her friends sleeve (?Qing Del Valle)  last June 2021 and she is doing very well.  The patient says the sister is her twin and she just had 2 strokes, in addition she had a pulmonary embolism in the past and her doctors feel she needs to proceed with revisional metabolic and bariatric surgery as soon as possible.  She is now on anticoagulation.  She says she has already seen me for planned modified duodenal switch/sips.  The patient said her mother had a pulmonary embolism after colon surgery and one of her sisters has been diagnosed with antiphospholipid syndrome.  The patient herself has not had history of VTE.  She says she has never been pregnant.  She does wish to have postoperative Eliquis, see below.  She says her gastroparesis is significant with nausea and sour stomach all the time and is hoping sleeve gastrectomy with hiatal hernia repair will help her symptoms.  She understands there is no good operation for gastroparesis and is not interested in Christina-en-Y gastric bypass at this time.  She follows with endocrinologist Dr. Cintron who recommended she have metabolic and bariatric surgery and also follows her for her goiter.  The patient voiced understanding to hold her aspirin 1 week prior in 6 weeks after surgery to minimize the risk of delayed leak.  The patient says she has been heavy her entire life.     Medical History        Past Medical History:   Diagnosis Date   • Acid reflux       controlled on protonix 40mg daily, EGD 2017 with ulcer, schlatzis ring   • Arthritis       CRP/ sed rate elevated, sees rheumatologist, questionable AI disease   • CKD (chronic kidney disease)     • Fatigue     • GERD (gastroesophageal reflux disease)     • Goiter 04/2016   • Gout       has prednisone prn to take for flares, last 2019   • Hip arthrosis 05/2017   • Hirsutism 12/2000   • Hyperlipidemia      • Hypertension     • Hypocalcemia     • Hyponatremia 04/01/22     After having nausea and vomiting for several days was noted with low sodium.   • Hypoparathyroidism (HCC)       on cacium supplements and Vit D   • Knee swelling 06/2021   • Lower extremity edema     • Morbid obesity with BMI of 45.0-49.9, adult (HCC)     • Obesity     • Polycystic ovary syndrome 12/2000   • Renal calculi     • S/P rukhsana       gallstones   • Sleep apnea       CPAP compliant   • Thyroid nodule 04/2016   • Type 2 diabetes mellitus (HCC)       dx 2001, on insulin since 1/2021, last A1C 6.8, sees endocrine   • Vitamin D deficiency 04/2016         Surgical History         Past Surgical History:   Procedure Laterality Date   • ELBOW PROCEDURE   06/2001     Surgery done after fall with fracture of left elbow.   • LAPAROSCOPIC CHOLECYSTECTOMY   2006     gallstones   • OTHER SURGICAL HISTORY         Elbow surgery            Allergies   Allergen Reactions   • Penicillins Rash   • Rocephin [Ceftriaxone] Rash         Current Outpatient Medications:   •  allopurinol (ZYLOPRIM) 300 MG tablet, Take  by mouth., Disp: , Rfl:   •  aspirin 81 MG EC tablet, Take 81 mg by mouth Daily., Disp: , Rfl:   •  B-D ULTRAFINE III SHORT PEN 31G X 8 MM misc, USE 1 NEW PEN NEEDLE 5 TIMES DAILY, Disp: 150 each, Rfl: 5  •  Blood Glucose Monitoring Suppl (True Metrix Meter) device, 1 Device Take As Directed., Disp: 1 Device, Rfl: 0  •  calcitriol (ROCALTROL) 0.25 MCG capsule, TAKE 2 CAPSULES BY MOUTH EVERY DAY, Disp: 60 capsule, Rfl: 11  •  Calcium Carb-Cholecalciferol 600-200 MG-UNIT tablet, Take 600 mg by mouth 4 (Four) Times a Day., Disp: , Rfl:   •  Continuous Blood Gluc Sensor (Dexcom G6 Sensor), USE AS DIRECTED EVERY  10  DAYS, Disp: 3 each, Rfl: 5  •  Continuous Blood Gluc Transmit (Dexcom G6 Transmitter) misc, USE AS DIRECTED, Disp: 1 each, Rfl: 0  •  fenofibrate (TRICOR) 145 MG tablet, TAKE ONE TABLET BY MOUTH EVERY DAY, Disp: 90 tablet, Rfl: 3  •   hydroCHLOROthiazide (HYDRODIURIL) 25 MG tablet, TAKE ONE TABLET BY MOUTH EVERY DAY, Disp: 30 tablet, Rfl: 11  •  Insulin Regular Human, Conc, (HumuLIN R U-500 KwikPen) 500 UNIT/ML solution pen-injector CONCENTRATED injection, 40 units AC BK, 80 units AC LN, 80 units AC DN, Disp: 12 mL, Rfl: 5  •  Lancets 30G misc, True Metrix Test 5 times dailt, Disp: 360 each, Rfl: 3  •  lisinopril (PRINIVIL,ZESTRIL) 40 MG tablet, Take 1 tablet by mouth Daily., Disp: , Rfl:   •  loratadine (CLARITIN) 10 MG tablet, Take 10 mg by mouth Daily., Disp: , Rfl:   •  metFORMIN (GLUCOPHAGE) 1000 MG tablet, Take 1 tablet by mouth 2 (Two) Times a Day With Meals., Disp: 180 tablet, Rfl: 3  •  metoprolol succinate XL (TOPROL-XL) 25 MG 24 hr tablet, Take 2 tablets by mouth Daily., Disp: 180 tablet, Rfl: 3  •  pantoprazole (PROTONIX) 40 MG EC tablet, Take  by mouth., Disp: , Rfl:   •  POTASSIUM CITRATE ER PO, Take 10 mEq by mouth Daily., Disp: , Rfl:   •  pravastatin (PRAVACHOL) 40 MG tablet, Take 1 tablet by mouth Daily., Disp: 90 tablet, Rfl: 3  •  True Metrix Blood Glucose Test test strip, 1 each by Other route 5 (Five) Times a Day. Use as instructed, Disp: 150 each, Rfl: 5  •  apixaban (Eliquis) 2.5 MG tablet tablet, Take 1 tablet by mouth Every 12 (Twelve) Hours for 42 doses., Disp: 42 tablet, Rfl: 0  •  Semaglutide (Rybelsus) 3 MG tablet, Take 1 tablet by mouth Every Morning., Disp: 30 tablet, Rfl: 2     Social History   Social History           Socioeconomic History   • Marital status: Single   Tobacco Use   • Smoking status: Never Smoker   • Smokeless tobacco: Never Used   Vaping Use   • Vaping Use: Never used   Substance and Sexual Activity   • Alcohol use: Never   • Drug use: Never   • Sexual activity: Not Currently               Family History   Problem Relation Age of Onset   • Hypertension Mother     • Hyperlipidemia Mother     • Lupus Mother     • Cancer Mother     • Pulmonary embolism Mother     • Obesity Mother     • Diabetes  Mother     • Osteoarthritis Mother     • Gout Mother     • Arthritis Mother     • Cancer Father     • Gout Father     • Osteoarthritis Father     • Diabetes Father     • Kidney failure Father     • Hypertension Father     • Hyperlipidemia Father     • Obesity Father     • Sleep apnea Father     • Hyperlipidemia Sister     • Hypertension Sister     • Diabetes Sister     • Pulmonary embolism Sister     • Obesity Sister     • Sleep apnea Sister     • Miscarriages / Stillbirths Sister     • Pulmonary embolism Sister     • Hypertension Brother     • Hyperlipidemia Brother     • Diabetes Brother     • Obesity Brother     • Sleep apnea Brother     • Obesity Maternal Grandmother     • Diabetes Maternal Grandmother     • Hypertension Maternal Grandmother     • Pulmonary embolism Maternal Grandmother     • Hypertension Maternal Grandfather     • Pulmonary embolism Maternal Grandfather     • Obesity Paternal Grandmother     • Diabetes Paternal Grandmother     • Hypertension Paternal Grandmother     • Sleep apnea Paternal Grandmother     • Obesity Paternal Grandfather     • Hypertension Paternal Grandfather     • Heart attack Paternal Grandfather     • Arthritis Other     • Colon cancer Other     • Hyperlipidemia Other     • Hypertension Other     • Obesity Other     • Diabetes Other     • Kidney disease Other     • Thyroid disease Other     • Stroke Other           Review of Systems   Constitutional: Positive for fatigue and unexpected weight gain. Negative for chills, diaphoresis, fever and unexpected weight loss.   HENT: Negative for congestion and facial swelling.    Eyes: Negative for blurred vision, double vision and discharge.   Respiratory: Negative for chest tightness, shortness of breath and stridor.    Cardiovascular: Positive for leg swelling. Negative for chest pain and palpitations.   Gastrointestinal: Positive for GERD. Negative for blood in stool.   Endocrine: Negative for polydipsia.   Genitourinary: Negative  for hematuria.   Musculoskeletal: Positive for arthralgias.   Skin: Negative for color change.   Allergic/Immunologic: Negative for immunocompromised state.   Neurological: Negative for confusion.   Psychiatric/Behavioral: Negative for self-injury.         I have reviewed the ROS and confirm that it's accurate today.     Physical Exam:  Vital Signs:  Weight: 128 kg (282 lb 8 oz)   Body mass index is 48.49 kg/m².  Temp: 97.7 °F (36.5 °C)   Heart Rate: 91   BP: 130/84      Physical Exam  Vitals reviewed.   Constitutional:       Appearance: She is well-developed.   HENT:      Head: Normocephalic and atraumatic.      Comments: Mild alopecia     Nose:      Comments: mask  Eyes:      Conjunctiva/sclera: Conjunctivae normal.      Pupils: Pupils are equal, round, and reactive to light.   Neck:      Thyroid: No thyromegaly.      Vascular: No carotid bruit.      Trachea: No tracheal deviation.   Cardiovascular:      Rate and Rhythm: Regular rhythm. Tachycardia present.      Heart sounds: Normal heart sounds.   Pulmonary:      Effort: Pulmonary effort is normal. No respiratory distress.      Breath sounds: Normal breath sounds.   Abdominal:      General: There is no distension.      Palpations: Abdomen is soft.      Tenderness: There is no abdominal tenderness.      Comments: Lap rukhsana   Musculoskeletal:         General: No deformity. Normal range of motion.      Cervical back: Normal range of motion and neck supple.   Skin:     General: Skin is warm and dry.      Findings: No rash.   Neurological:      Mental Status: She is alert and oriented to person, place, and time.      Cranial Nerves: No cranial nerve deficit.      Coordination: Coordination normal.   Psychiatric:         Behavior: Behavior normal.         Thought Content: Thought content normal.         Judgment: Judgment normal.                Patient Active Problem List   Diagnosis   • Arthritis   • GERD (gastroesophageal reflux disease)   • Gout   • Mixed  hyperlipidemia   • Idiopathic hypoparathyroidism ([facility])   • Essential hypertension   • Uncontrolled type 2 diabetes mellitus with hyperglycemia ([facility])   • SANYA on CPAP   • Generalized edema   • Nodular goiter   • Lower extremity edema   • Fatigue   • Morbid obesity with BMI of 45.0-49.9, adult ([facility])   • Type 2 diabetes mellitus ([facility])   • Sleep apnea   • S/P rukhsana   • Hypocalcemia   • CKD (chronic kidney disease)   • Acid reflux   • Preoperative clearance   • Morbid obesity with body mass index (BMI) of 45.0 to 49.9 in adult ([facility])         Assessment:     Natty Fuentes is a 41 y.o. year old female with medically complicated obesity.     Metabolic and bariatric surgery is deemed medically necessary given the following obesity related comorbidities including hiatal hernia with GE reflux, arthritis, elevated CRP and sed rate questionable autoimmune disease, chronic kidney disease, fatigue, gout, hyperlipidemia, hypertension, hypocalcemia, hypoparathyroidism, peripheral edema, nephrolithiasis, obstructive sleep apnea on CPAP, type 2 insulin-dependent diabetes mellitus, gastroparesis with current Weight: 128 kg (282 lb 8 oz) and Body mass index is 48.49 kg/m²..     Encounter Diagnoses   Name Primary?   • Morbid obesity with body mass index of 45.0-49.9 in adult ([facility]) Yes   • Hiatal hernia with gastroesophageal reflux        Patient is aware that surgery is a tool, and that weight loss and improvement in comorbidities is not guaranteed but only seen in the context of appropriate use, follow up and physical activity.     The patient was present for an approximately a 2.5 hour discussion of the purpose of MBS, how MBS is a tool to assist in achieving weight loss goals, the most common complications and how best to avoid them, and the strategies for short and long term weight loss and improvement in comorbidities.  Ample opportunity to discuss questions was available both in group and during the time of individual  examination.     I reviewed her Neto report which is negative.  Labs dated 7/29/2022 showing a normal CMP except for glucose 2 5041 chloride 97 CO2 21.3 calcium 8.3 of note albumin 4.18 normal CBC with microcytic indices of note hemoglobin 12.3.  Chest x-ray dated 7/29/2022 no active process.  EGD Dr. Salgado dated 6/21/2021 showing a fairly unremarkable upper endoscopy with thick rugal folds no obvious hiatal hernia Z-line 39 cm no visible Schatzki's ring no bile reflux gastritis.  I noted I performed upper endoscopy on her Sister Valerie Rodriguez who had a sleeve gastrectomy with Dr. Adan in 2017 and did not lose weight upper GI showed a dilated sleeve and she is currently seeking revision and control of her severe reflux the patient herself has severe reflux that is longstanding and had an EGD in 2017 apparently showing a hiatal hernia with widemouth Schatzki's ring and the patient noted her reflux has been worsening lately despite proton pump inhibitors denies dysphagia but does feel that food sits in her stomach and she is a diabetic and gastric emptying study showed an abnormal delayed gastric emptying with 84% retention at 120 minutes and it took 380 minutes to achieve 50% emptying consistent with significant gastroparesis.  Pathology of the antrum showed mild chronic gastropathy negative for H. pylori distal esophageal biopsies showed mild mucosal reactive change suggestive of reflux otherwise unremarkable mid esophageal biopsies showed mild mucosal reactive change otherwise unremarkable.  Psychosocial evaluation dated 6/7/2021 SHARON Valentino noting that she does not find any significant anxiety or depression that would contraindicate proceeding with bariatric surgery.  Dietitian evaluation dated 4/14/2021 Deirdre Mckeon RD labs dated 3/15/2022 unremarkable CBC except for microcytic indices of note hemoglobin 11.9 normal CMP except for glucose of 161 sodium 135 calcium 8.2 AST 36 of note albumin 4.0.   "Negative H. pylori breath test dated 5/6/2021 upper GI dated 6/25/2021 showing a small hiatal hernia and moderate reflux read by Alexander Dean MD.  Abnormal gastric emptying study dated 5/11/2021 once again with 84% retention 128 minutes.  Cardiology clearance dated 6/7/2021 Elizabeth Riggs MD he noted no heart murmur.  Exercise stress test was unremarkable echocardiogram showed an ejection fraction of 61 to 65% normal diastolic function no significant valvular disease.  He cleared her with that okay to hold aspirin.  Letter support primary care provider Therese Cintron MD dated 5/4/2021.  Renal clearance dated 6/4/2021 Silva Connolly MD.  Please see scanned records that I have reviewed and signed off on today.  All of this in addition to the patient's unique history and exam has been taken into consideration in determining their appropriate candidacy for MBS.     Complications  of laparoscopic/possible robotic gastric sleeve were discussed. The patient is well aware of the potential complications of surgery that include but not limited to bleeding, infections, deep venous thrombosis, pulmonary embolism, pulmonary complications such as pneumonia, cardiac events, hernias, small bowel obstruction, damage to the spleen or other organs, bowel injury, disfiguring scars, failure to lose weight, need for additional surgery, conversion to an open procedure, and death. Patient is also aware of complications which apply in this particular procedure that can include but are not limited to a \"leak\" at the staple line which in some instances may require conversion to gastric bypass.     The patient is aware if a hiatal hernia is encountered, it likely will be repaired.  R/B/A Rx to hiatal hernia repair were discussed as outlined in our long consent form.  Briefly risks in addition to those for LSG include recurrent hernia, TIO, dysphagia, esophageal injury, pneumothorax, injury to the vagus nerves, injury to the thoracic duct, " aorta or vena cava.     I discussed avoiding all tobacco products, nicotine,  and second hand smoke at least 2 weeks pre-operatively and 6 weeks post-operatively to minimize the risk of sleeve leak.  This included discussing the importance of avoiding even secondhand smoke as the risk of leak is increased.  Examples discussed:  Avoid going in a house or riding in a car where someone has previously smoked in the last 2 weeks and for 6 weeks postoperatively.  Avoid living in a house where someone smokes (even if it's in a separate room/patio/attached garage, etc.).   Avoid congregating with a group of people who are smoking even if it's outside.  It is OK to be around wood burning fires and barbecue.  I explained that I do not know if marijuana has a same effects but my overall recommendation is to avoid it for 2 weeks prior in 6 weeks after surgery.      Discussed the risks, benefits and alternative therapies at great length as outlined in our extensive consent forms, consent videos, and educational teaching process under the direction of the center's .     A copy of the patient's signed informed consent is on file.     R/B/A Rx discussed to postop anticoagulation incl but not limited to bleeding, drug reaction, venothromboembolic events, etc. and agreeable to taking post op  Eliquis 2.5 mg po Q 12 hrs #42        Plan: After evaluation today I think the patient is a reasonable candidate for laparoscopic sleeve gastrectomy, hiatal hernia repair, and EGD.  Levaquin.  Type and screen.  Other issues include hiatal hernia with GE reflux, arthritis, elevated CRP and sed rate questionable autoimmune disease, chronic kidney disease, fatigue, gout, hyperlipidemia, hypertension, hypocalcemia, hypoparathyroidism, peripheral edema, nephrolithiasis, obstructive sleep apnea on CPAP, type 2 insulin-dependent diabetes mellitus, gastroparesis.     Thank you Dr. Naranjo for the opportunity evaluate Ms.  Gina.           Evans Salgado MD

## 2022-09-08 NOTE — BRIEF OP NOTE
GASTRIC SLEEVE LAPAROSCOPIC, HIATAL HERNIA REPAIR LAPAROSCOPIC, ESOPHAGOGASTRODUODENOSCOPY  Progress Note    Natty Fuentes  9/8/2022    Pre-op Diagnosis:   Morbid obesity with body mass index of 45.0-49.9 in adult ([facility]) [E66.01, Z68.42]  Hiatal hernia with gastroesophageal reflux [K44.9, K21.9]       Post-Op Diagnosis Codes:     * Morbid obesity with body mass index of 45.0-49.9 in adult ([facility]) [E66.01, Z68.42]     * Hiatal hernia with gastroesophageal reflux [K44.9, K21.9]    Procedure/CPT® Codes:  RI LAP, WATSON RESTRICT PROC, LONGITUDINAL GASTRECTOMY [95216]  RI LAP,ESOPHAGOGAST FUNDOPLASTY [27662]  RI ESOPHAGOGASTRODUODENOSCOPY TRANSORAL DIAGNOSTIC [80450]      Procedure(s):  GASTRIC SLEEVE LAPAROSCOPIC  HIATAL HERNIA REPAIR LAPAROSCOPIC  ESOPHAGOGASTRODUODENOSCOPY    Surgeon(s):  Evans Salgado MD    Anesthesia: General with Block    Staff:   Circulator: Ekaterina Medina RN; Batsheva Jaramillo RN  Scrub Person: Tiffanie Wolfe; Allie Lr RN  Nursing Assistant: Crystal Devi CNA; Duncan Meadows PCT         Estimated Blood Loss: minimal    Urine Voided: * No values recorded between 9/8/2022 11:13 AM and 9/8/2022 12:47 PM *    Specimens:                Specimens     ID Source Type Tests Collected By Collected At Frozen?    A Stomach Tissue · TISSUE PATHOLOGY EXAM   Evans Salgado MD 9/8/22 1147 No    Description: SUB-TOTAL GASTRECTOMY    This specimen was not marked as sent.                Drains: * No LDAs found *    Findings:         Complications: none          Evans Salgado MD     Date: 9/8/2022  Time: 12:49 EDT

## 2022-09-09 ENCOUNTER — APPOINTMENT (OUTPATIENT)
Dept: GENERAL RADIOLOGY | Facility: HOSPITAL | Age: 42
End: 2022-09-09

## 2022-09-09 VITALS
OXYGEN SATURATION: 97 % | HEIGHT: 64 IN | DIASTOLIC BLOOD PRESSURE: 63 MMHG | BODY MASS INDEX: 48.23 KG/M2 | TEMPERATURE: 98.6 F | HEART RATE: 76 BPM | WEIGHT: 282.5 LBS | RESPIRATION RATE: 16 BRPM | SYSTOLIC BLOOD PRESSURE: 119 MMHG

## 2022-09-09 LAB
ALBUMIN SERPL-MCNC: 4.1 G/DL (ref 3.5–5.2)
ALBUMIN/GLOB SERPL: 1.3 G/DL
ALP SERPL-CCNC: 59 U/L (ref 39–117)
ALT SERPL W P-5'-P-CCNC: 43 U/L (ref 1–33)
ANION GAP SERPL CALCULATED.3IONS-SCNC: 14 MMOL/L (ref 5–15)
AST SERPL-CCNC: 36 U/L (ref 1–32)
BASOPHILS # BLD AUTO: 0.05 10*3/MM3 (ref 0–0.2)
BASOPHILS NFR BLD AUTO: 0.5 % (ref 0–1.5)
BILIRUB SERPL-MCNC: 1 MG/DL (ref 0–1.2)
BUN SERPL-MCNC: 15 MG/DL (ref 6–20)
BUN/CREAT SERPL: 16.3 (ref 7–25)
CALCIUM SPEC-SCNC: 7.9 MG/DL (ref 8.6–10.5)
CHLORIDE SERPL-SCNC: 101 MMOL/L (ref 98–107)
CO2 SERPL-SCNC: 22 MMOL/L (ref 22–29)
CREAT SERPL-MCNC: 0.92 MG/DL (ref 0.57–1)
CYTO UR: NORMAL
DEPRECATED RDW RBC AUTO: 44 FL (ref 37–54)
EGFRCR SERPLBLD CKD-EPI 2021: 79.9 ML/MIN/1.73
EOSINOPHIL # BLD AUTO: 0.04 10*3/MM3 (ref 0–0.4)
EOSINOPHIL NFR BLD AUTO: 0.4 % (ref 0.3–6.2)
ERYTHROCYTE [DISTWIDTH] IN BLOOD BY AUTOMATED COUNT: 15.5 % (ref 12.3–15.4)
GLOBULIN UR ELPH-MCNC: 3.1 GM/DL
GLUCOSE BLDC GLUCOMTR-MCNC: 188 MG/DL (ref 70–130)
GLUCOSE BLDC GLUCOMTR-MCNC: 192 MG/DL (ref 70–130)
GLUCOSE BLDC GLUCOMTR-MCNC: 210 MG/DL (ref 70–130)
GLUCOSE SERPL-MCNC: 196 MG/DL (ref 65–99)
HCT VFR BLD AUTO: 38.8 % (ref 34–46.6)
HGB BLD-MCNC: 12.9 G/DL (ref 12–15.9)
IMM GRANULOCYTES # BLD AUTO: 0.08 10*3/MM3 (ref 0–0.05)
IMM GRANULOCYTES NFR BLD AUTO: 0.8 % (ref 0–0.5)
IRON 24H UR-MRATE: 37 MCG/DL (ref 37–145)
LAB AP CASE REPORT: NORMAL
LAB AP CLINICAL INFORMATION: NORMAL
LYMPHOCYTES # BLD AUTO: 2.97 10*3/MM3 (ref 0.7–3.1)
LYMPHOCYTES NFR BLD AUTO: 28.8 % (ref 19.6–45.3)
MCH RBC QN AUTO: 26.4 PG (ref 26.6–33)
MCHC RBC AUTO-ENTMCNC: 33.2 G/DL (ref 31.5–35.7)
MCV RBC AUTO: 79.5 FL (ref 79–97)
MONOCYTES # BLD AUTO: 0.98 10*3/MM3 (ref 0.1–0.9)
MONOCYTES NFR BLD AUTO: 9.5 % (ref 5–12)
NEUTROPHILS NFR BLD AUTO: 6.18 10*3/MM3 (ref 1.7–7)
NEUTROPHILS NFR BLD AUTO: 60 % (ref 42.7–76)
NRBC BLD AUTO-RTO: 0 /100 WBC (ref 0–0.2)
PATH REPORT.FINAL DX SPEC: NORMAL
PATH REPORT.GROSS SPEC: NORMAL
PLATELET # BLD AUTO: 298 10*3/MM3 (ref 140–450)
PMV BLD AUTO: 10.4 FL (ref 6–12)
POTASSIUM SERPL-SCNC: 3.8 MMOL/L (ref 3.5–5.2)
PROT SERPL-MCNC: 7.2 G/DL (ref 6–8.5)
RBC # BLD AUTO: 4.88 10*6/MM3 (ref 3.77–5.28)
SODIUM SERPL-SCNC: 137 MMOL/L (ref 136–145)
WBC NRBC COR # BLD: 10.3 10*3/MM3 (ref 3.4–10.8)

## 2022-09-09 PROCEDURE — 0 POTASSIUM CHLORIDE PER 2 MEQ: Performed by: SURGERY

## 2022-09-09 PROCEDURE — 25010000002 THIAMINE PER 100 MG: Performed by: SURGERY

## 2022-09-09 PROCEDURE — 25010000002 ENOXAPARIN PER 10 MG: Performed by: SURGERY

## 2022-09-09 PROCEDURE — 25010000002 NA FERRIC GLUC CPLX PER 12.5 MG: Performed by: SURGERY

## 2022-09-09 PROCEDURE — 85025 COMPLETE CBC W/AUTO DIFF WBC: CPT | Performed by: SURGERY

## 2022-09-09 PROCEDURE — 83540 ASSAY OF IRON: CPT | Performed by: SURGERY

## 2022-09-09 PROCEDURE — 99024 POSTOP FOLLOW-UP VISIT: CPT | Performed by: SURGERY

## 2022-09-09 PROCEDURE — 80053 COMPREHEN METABOLIC PANEL: CPT | Performed by: SURGERY

## 2022-09-09 PROCEDURE — 25010000002 LEVOFLOXACIN PER 250 MG: Performed by: SURGERY

## 2022-09-09 PROCEDURE — 74240 X-RAY XM UPR GI TRC 1CNTRST: CPT

## 2022-09-09 PROCEDURE — 63710000001 INSULIN LISPRO (HUMAN) PER 5 UNITS: Performed by: PHYSICIAN ASSISTANT

## 2022-09-09 PROCEDURE — 82962 GLUCOSE BLOOD TEST: CPT

## 2022-09-09 PROCEDURE — 0 DIATRIZOATE MEGLUMINE & SODIUM PER 1 ML

## 2022-09-09 PROCEDURE — 25010000002 CYANOCOBALAMIN PER 1000 MCG: Performed by: SURGERY

## 2022-09-09 RX ORDER — ONDANSETRON 4 MG/1
4 TABLET, FILM COATED ORAL EVERY 4 HOURS PRN
Qty: 10 TABLET | Refills: 0 | Status: SHIPPED | OUTPATIENT
Start: 2022-09-09

## 2022-09-09 RX ORDER — OXYCODONE HYDROCHLORIDE 5 MG/1
5 TABLET ORAL EVERY 4 HOURS PRN
Qty: 10 TABLET | Refills: 0 | Status: SHIPPED | OUTPATIENT
Start: 2022-09-09 | End: 2022-09-28

## 2022-09-09 RX ADMIN — FOLIC ACID 100 ML/HR: 5 INJECTION, SOLUTION INTRAMUSCULAR; INTRAVENOUS; SUBCUTANEOUS at 00:57

## 2022-09-09 RX ADMIN — CETIRIZINE HYDROCHLORIDE TABLETS 10 MG: 10 TABLET, FILM COATED ORAL at 08:32

## 2022-09-09 RX ADMIN — ACETAMINOPHEN 1000 MG: 500 TABLET, FILM COATED ORAL at 14:25

## 2022-09-09 RX ADMIN — PANTOPRAZOLE SODIUM 40 MG: 40 INJECTION, POWDER, FOR SOLUTION INTRAVENOUS at 05:22

## 2022-09-09 RX ADMIN — ACETAMINOPHEN 1000 MG: 500 TABLET, FILM COATED ORAL at 05:22

## 2022-09-09 RX ADMIN — GABAPENTIN 100 MG: 100 CAPSULE ORAL at 08:33

## 2022-09-09 RX ADMIN — CYANOCOBALAMIN 1000 MCG: 1000 INJECTION, SOLUTION INTRAMUSCULAR; SUBCUTANEOUS at 08:33

## 2022-09-09 RX ADMIN — LEVOFLOXACIN 500 MG: 500 INJECTION, SOLUTION INTRAVENOUS at 14:15

## 2022-09-09 RX ADMIN — INSULIN LISPRO 3 UNITS: 100 INJECTION, SOLUTION INTRAVENOUS; SUBCUTANEOUS at 14:15

## 2022-09-09 RX ADMIN — INSULIN LISPRO 2 UNITS: 100 INJECTION, SOLUTION INTRAVENOUS; SUBCUTANEOUS at 08:40

## 2022-09-09 RX ADMIN — ENOXAPARIN SODIUM 40 MG: 40 INJECTION SUBCUTANEOUS at 08:32

## 2022-09-09 RX ADMIN — LISINOPRIL 40 MG: 40 TABLET ORAL at 08:33

## 2022-09-09 RX ADMIN — ALLOPURINOL 300 MG: 300 TABLET ORAL at 08:33

## 2022-09-09 RX ADMIN — METOPROLOL SUCCINATE 25 MG: 25 TABLET, EXTENDED RELEASE ORAL at 08:32

## 2022-09-09 RX ADMIN — POTASSIUM CHLORIDE AND SODIUM CHLORIDE 125 ML/HR: 450; 150 INJECTION, SOLUTION INTRAVENOUS at 07:46

## 2022-09-09 RX ADMIN — SODIUM CHLORIDE 125 MG: 9 INJECTION, SOLUTION INTRAVENOUS at 14:47

## 2022-09-09 NOTE — PLAN OF CARE
Goal Outcome Evaluation:  Plan of Care Reviewed With: patient           Outcome Evaluation: patient alert and oriented IS at 2000, ambulating independently, pain well controlled patient eager for dc MD aware

## 2022-09-09 NOTE — CASE MANAGEMENT/SOCIAL WORK
Discharge Planning Assessment  [facility]     Patient Name: Natty Fuentes  MRN: [id number]  Today's Date: 9/9/2022    Admit Date: 9/8/2022     Discharge Needs Assessment     Row Name 09/09/22 1154       Living Environment    People in Home alone    Current Living Arrangements home    Primary Care Provided by self    Provides Primary Care For no one    Family Caregiver if Needed parent(s)    Family Caregiver Names Allie Fuentes    Quality of Family Relationships involved;helpful;supportive    Able to Return to Prior Arrangements yes       Transition Planning    Patient/Family Anticipates Transition to home    Patient/Family Anticipated Services at Transition none    Transportation Anticipated family or friend will provide       Discharge Needs Assessment    Readmission Within the Last 30 Days no previous admission in last 30 days    Equipment Currently Used at Home cpap    Concerns to be Addressed no discharge needs identified;denies needs/concerns at this time    Anticipated Changes Related to Illness none    Equipment Needed After Discharge none    Discharge Coordination/Progress Home               Discharge Plan     Row Name 09/09/22 1156       Plan    Plan Home    Patient/Family in Agreement with Plan yes    Plan Comments Spoke with patient at bedside regarding discharge planning.  Patient denies use of HH and reports she has a CPAP provided through [employer].  She reports that she has prescription coverage and her medications are affordable.  She lives with her mother in a single level house with five steps to enter.  She is anticipating going home today.  No discharge needs verbalized.  CM following.  Patient plan is to discharge home via car with family to transport.    Final Discharge Disposition Code 01 - home or self-care              Continued Care and Services - Admitted Since 9/8/2022    Coordination has not been started for this encounter.       Expected Discharge Date and Time     Expected Discharge  Date Expected Discharge Time    Sep 10, 2022          Demographic Summary     Row Name 09/09/22 1153       General Information    Admission Type inpatient    Arrived From home    Referral Source admission list    Reason for Consult discharge planning    Preferred Language English    General Information Comments Nidia Naranjo MD       Contact Information    Permission Granted to Share Info With     Contact Information Obtained for     Contact Information Comments Alile Fuentes,  [phone]               Functional Status     Row Name 09/09/22 1154       Functional Status    Usual Activity Tolerance good    Current Activity Tolerance good       Functional Status, IADL    Medications independent    Meal Preparation independent    Housekeeping independent    Laundry independent    Shopping independent       Employment/    Employment/ Comments Misc Commercial               Psychosocial    No documentation.                Abuse/Neglect    No documentation.                Legal    No documentation.                Substance Abuse    No documentation.                Patient Forms    No documentation.                   Ekaterina Duval, RN

## 2022-09-09 NOTE — CONSULTS
"Consult received for diabetes education. Patient was resting in bed. Patient is seen by Dr. Cintron at [facility]. She wears a Dexcom G6. She did not have that currently on. She takes U-500 60 units with meals and Metformin. She stated her blood sugars have been elevated since surgery and we discussed how stress can elevate blood sugars.She was every knowledgeable on her diabetes care.  She denies hypoglycemia. She has a follow up with Dr. Cintron at the end of the month. All questions answered. Patient provided \"What is Diabetes\" A1c and target blood glucose goals.   "

## 2022-09-09 NOTE — PROGRESS NOTES
"Cc: POD#1  LSG/HHR  \"feel fine\"    Her identical twin sister is in the room (she is seen me for sleeve revision status post sleeve gastrectomy Dr. Adan in [place]).  The patient looks and feels well and is anxious to go home.  She is tolerating her diet without nausea.  Ambulating and voiding well.  No pulmonary complaints, spirometer 2000.  Passing flatus, no bowel movement.    No fever or tachycardia pulse 76 respiration 16 blood pressure 119/63 saturation 97%  - 600 she is in no apparent distress.  Lungs clear to auscultation.  Heart regular rate and rhythm.  Abdomen soft, nontender, nondistended, bowel sounds active.  Wounds look okay.    CMP normal except for glucose of 196 calcium 7.9 ALT 43 AST 36.  Of note albumin 4.10.  Iron is normal at 37.  White count normal at 10.3 with a normal differential.  H&H normal with microcytic indices of note hemoglobin 12.9.  Hemoglobin A1c 8.40 upper GI images and report reviewed, unremarkable    Impression: Postop day #1 laparoscopic sleeve gastrectomy and hiatal hernia repair.  Doing well clinically, would like to go home and does meet discharge criteria.  Poorly controlled insulin-dependent diabetes mellitus.  Elevated transaminases likely related to fatty liver and liver retraction during surgery.  Microcytic indices with normal iron level and normal hemoglobin.    Plan: Discharge home.  Discharge instructions discussed.  She says she has her Eliquis at home.  She says her physician told her to stop her insulin and metformin and would work with her postoperatively on blood sugar management.  See orders.  Call with any problems questions or concerns.        "

## 2022-09-09 NOTE — PLAN OF CARE
Goal Outcome Evaluation:  Plan of Care Reviewed With: patient        Progress: improving  Outcome Evaluation: A&Ox4. RA. 2LNC while sleeping. VSS. NSR on the monitor. Pt c/o of gas discomfort and pain 3/10. Simethicone PRN and Tylenol given as scheduled. Pt has ambulated this shift and is tolerating sips and chips. Adequate urine output. pt denies any additional complaints at this time. Nursing staff will continue to monitor.

## 2022-09-15 ENCOUNTER — OFFICE VISIT (OUTPATIENT)
Dept: BARIATRICS/WEIGHT MGMT | Facility: CLINIC | Age: 42
End: 2022-09-15

## 2022-09-15 VITALS
TEMPERATURE: 97.8 F | OXYGEN SATURATION: 95 % | DIASTOLIC BLOOD PRESSURE: 86 MMHG | WEIGHT: 258.5 LBS | BODY MASS INDEX: 44.13 KG/M2 | HEART RATE: 83 BPM | SYSTOLIC BLOOD PRESSURE: 132 MMHG | HEIGHT: 64 IN

## 2022-09-15 DIAGNOSIS — E66.01 OBESITY, CLASS III, BMI 40-49.9 (MORBID OBESITY): Primary | ICD-10-CM

## 2022-09-15 PROCEDURE — 99024 POSTOP FOLLOW-UP VISIT: CPT | Performed by: PHYSICIAN ASSISTANT

## 2022-09-15 NOTE — PROGRESS NOTES
[facility] Bariatric Surgery  [address]    [facility] [id number] [phone]      Patient Name:  Natty Fuentes  :  1980      Date of Visit: 09/15/2022      Reason for Visit:  POD #7    HPI:  Natty Fuentes is a 41 y.o. female s/p LSG/HHR 22 GDW     Discharged on POD#1.  Doing well.  No issues/concerns.  Denies dysphagia, reflux, nausea, vomiting, abdominal pain, pulmonary issues and fevers. Using IS to . Tolerating diet progression - on stage 1.  Getting 80+g prot/day.  Drinking 64 fluid oz/day. Voiding ok-clear to pale yellow. Taking all vitamins..  On Pantoprazole and Eliquis .  Holding ASA , NSAIDs , Tramadol, Hormones, Diuretics , Steroids and Immunologics.  Ambulating. S/p lap rukhsana     Final Diagnosis   STOMACH, SUBTOTAL GASTRECTOMY:  Fundic gland polyp  Oxyntic-type gastric mucosa with minimal chronic inactive inflammation  Negative for intestinal metaplasia, dysplasia and malignancy  Stomach wall within normal limits          Presurgery weight: 282 pounds.  Today's weight is 117 kg (258 lb 8 oz) pounds, today's  Body mass index is 44.37 kg/m²., and weight loss since surgery is 24 pounds.       Past Medical History:   Diagnosis Date   • Acid reflux     controlled on protonix 40mg daily, EGD  with ulcer, schlatzis ring   • Arthritis     CRP/ sed rate elevated, sees rheumatologist, questionable AI disease   • Fatigue    • Gastroparesis    • Goiter 2016   • Gout     has prednisone prn to take for flares, last    • Hiatal hernia with gastroesophageal reflux     EGD Dr. Salgado 2021, upper GI 2021   • Hip arthrosis 2017   • Hirsutism 2000   • Hyperlipidemia    • Hypertension    • Hypocalcemia    • Hyponatremia 22    After having nausea and vomiting for several days was noted with low sodium.   • Hypoparathyroidism (HCC)     on cacium supplements and Vit D   • Knee swelling 2021   • Lower extremity edema    •  Microcytic erythrocytes    • Morbid obesity with BMI of 45.0-49.9, adult ([facility])    • Neuromuscular disorder (HCC)    • Obesity    • Polycystic ovary syndrome 12/2000   • Renal calculi    • S/P rukhsana     gallstones   • Sleep apnea     CPAP compliant   • Thyroid nodule 04/2016   • Type 2 diabetes mellitus (HCC)     dx 2001, on insulin since 1/2021, last A1C 6.8, sees endocrine   • Vitamin D deficiency 04/2016     Past Surgical History:   Procedure Laterality Date   • COLONOSCOPY  2019   • ELBOW PROCEDURE Left 06/2001    Surgery done after fall with fracture of left elbow.   • ENDOSCOPY N/A 9/8/2022    Procedure: ESOPHAGOGASTRODUODENOSCOPY;  Surgeon: Evans Salgado MD;  Location:  [facility];  Service: Bariatric;  Laterality: N/A;   • FOOT SURGERY Right     Cyst 2003   • GASTRECTOMY N/A 9/8/2022    Procedure: GASTRECTOMY LAPAROSCOPIC;  Surgeon: Evans Salgado MD;  Location:  [facility];  Service: Bariatric;  Laterality: N/A;   • HIATAL HERNIA REPAIR N/A 9/8/2022    Procedure: HIATAL HERNIA REPAIR LAPAROSCOPIC;  Surgeon: Evans Salgado MD;  Location:  [facility];  Service: Bariatric;  Laterality: N/A;  [id number]; resumed [id number]   • LAPAROSCOPIC CHOLECYSTECTOMY  2006    gallstones     Outpatient Medications Marked as Taking for the 9/15/22 encounter (Office Visit) with Amira Langford PA   Medication Sig Dispense Refill   • allopurinol (ZYLOPRIM) 300 MG tablet Take 300 mg by mouth Daily.     • B-D ULTRAFINE III SHORT PEN 31G X 8 MM misc USE 1 NEW PEN NEEDLE 5 TIMES DAILY 150 each 5   • Blood Glucose Monitoring Suppl (True Metrix Meter) device 1 Device Take As Directed. 1 Device 0   • calcitriol (ROCALTROL) 0.25 MCG capsule TAKE 2 CAPSULES BY MOUTH EVERY DAY 60 capsule 11   • Calcium Carb-Cholecalciferol 600-200 MG-UNIT tablet Take 600 mg by mouth 4 (Four) Times a Day.     • Continuous Blood Gluc Sensor (Dexcom G6 Sensor) USE AS DIRECTED EVERY  10  DAYS 3 each 5   • Continuous Blood Gluc Transmit (Dexcom G6  "Transmitter) misc USE AS DIRECTED 1 each 0   • fenofibrate (TRICOR) 145 MG tablet TAKE ONE TABLET BY MOUTH EVERY DAY 90 tablet 3   • Lancets 30G misc True Metrix Test 5 times dailt 360 each 3   • lisinopril (PRINIVIL,ZESTRIL) 40 MG tablet Take 1 tablet by mouth Daily.     • loratadine (CLARITIN) 10 MG tablet Take 10 mg by mouth Daily.     • metoprolol succinate XL (TOPROL-XL) 25 MG 24 hr tablet Take 2 tablets by mouth Daily. (Patient taking differently: Take 25 mg by mouth Daily.) 180 tablet 3   • ondansetron (Zofran) 4 MG tablet Take 1 tablet by mouth Every 4 (Four) Hours As Needed for Nausea. 10 tablet 0   • oxyCODONE (Roxicodone) 5 MG immediate release tablet Take 1 tablet by mouth Every 4 (Four) Hours As Needed for Moderate Pain. 10 tablet 0   • pantoprazole (PROTONIX) 40 MG EC tablet Take 40 mg by mouth Daily.     • POTASSIUM CITRATE ER PO Take 10 mEq by mouth 3 (Three) Times a Day.     • pravastatin (PRAVACHOL) 40 MG tablet Take 1 tablet by mouth Daily. 90 tablet 3   • True Metrix Blood Glucose Test test strip 1 each by Other route 5 (Five) Times a Day. Use as instructed 150 each 5     Allergies   Allergen Reactions   • Penicillins Rash   • Rocephin [Ceftriaxone] Rash       Social History     Socioeconomic History   • Marital status: Single   Tobacco Use   • Smoking status: Never Smoker   • Smokeless tobacco: Never Used   Vaping Use   • Vaping Use: Never used   Substance and Sexual Activity   • Alcohol use: Never   • Drug use: Never   • Sexual activity: Not Currently     Social History     Social History Narrative    Pt is a 40 yr old female, single with 0 children living in [place], [place]. Pt works full time at [employer].       /86   Pulse 83   Temp 97.8 °F (36.6 °C)   Ht 162.6 cm (64\")   Wt 117 kg (258 lb 8 oz)   LMP 08/24/2022   SpO2 95%   BMI 44.37 kg/m²   Physical Exam  Constitutional:       Appearance: She is obese.   HENT:      Head: Normocephalic and atraumatic. "   Cardiovascular:      Rate and Rhythm: Normal rate and regular rhythm.   Pulmonary:      Effort: Pulmonary effort is normal.      Breath sounds: Normal breath sounds.   Abdominal:      General: Bowel sounds are normal. There is no distension.      Palpations: Abdomen is soft. There is no mass.      Tenderness: There is no abdominal tenderness.      Comments: Lap incisions healing well.  No erythema, fluctuation, drainage.   Skin:     General: Skin is warm and dry.   Neurological:      General: No focal deficit present.      Mental Status: She is alert and oriented to person, place, and time.   Psychiatric:         Mood and Affect: Mood normal.         Behavior: Behavior normal.           Assessment:   POD #7    Class 3 Severe Obesity (BMI >=40). Obesity-related health conditions include the following: as above . Obesity is improving with treatment. BMI is is above average; BMI management plan is completed. We discussed low calorie, low carb based diet program, portion control and increasing exercise.      Plan:  Doing well.  Continue to advance diet per manual.  Increase protein 100g/day.  Increase exercise/activity as tolerated.  Reviewed lifting restrictions, nothing >25 lbs x 2 more weeks.  Start vitamins as discussed.  Continue PPI and Eliquis.  Continue to avoid ASA/NSAIDs/tobacco x 6 weeks postop, steroids x 8 weeks postop.  Call w/ problems/concerns.    The patient was instructed to follow up in 3 weeks, sooner if needed.

## 2022-09-27 DIAGNOSIS — E11.65 UNCONTROLLED TYPE 2 DIABETES MELLITUS WITH HYPERGLYCEMIA: ICD-10-CM

## 2022-09-27 RX ORDER — PROCHLORPERAZINE 25 MG/1
SUPPOSITORY RECTAL
Qty: 3 EACH | Refills: 0 | Status: SHIPPED | OUTPATIENT
Start: 2022-09-27 | End: 2022-12-30

## 2022-09-28 ENCOUNTER — LAB (OUTPATIENT)
Dept: LAB | Facility: HOSPITAL | Age: 42
End: 2022-09-28

## 2022-09-28 ENCOUNTER — OFFICE VISIT (OUTPATIENT)
Dept: ENDOCRINOLOGY | Facility: CLINIC | Age: 42
End: 2022-09-28

## 2022-09-28 VITALS
OXYGEN SATURATION: 98 % | WEIGHT: 255 LBS | DIASTOLIC BLOOD PRESSURE: 64 MMHG | HEART RATE: 65 BPM | BODY MASS INDEX: 43.54 KG/M2 | SYSTOLIC BLOOD PRESSURE: 118 MMHG | HEIGHT: 64 IN

## 2022-09-28 DIAGNOSIS — M1A.0790 CHRONIC GOUT OF FOOT, UNSPECIFIED CAUSE, UNSPECIFIED LATERALITY: ICD-10-CM

## 2022-09-28 DIAGNOSIS — E20.0 IDIOPATHIC HYPOPARATHYROIDISM: ICD-10-CM

## 2022-09-28 DIAGNOSIS — E78.2 MIXED HYPERLIPIDEMIA: ICD-10-CM

## 2022-09-28 DIAGNOSIS — E11.9 TYPE 2 DIABETES MELLITUS WITHOUT COMPLICATION, WITHOUT LONG-TERM CURRENT USE OF INSULIN: Primary | ICD-10-CM

## 2022-09-28 LAB
EXPIRATION DATE: NORMAL
GLUCOSE BLDC GLUCOMTR-MCNC: 168 MG/DL (ref 70–130)
HBA1C MFR BLD: 7.4 %
Lab: NORMAL

## 2022-09-28 PROCEDURE — 84550 ASSAY OF BLOOD/URIC ACID: CPT | Performed by: INTERNAL MEDICINE

## 2022-09-28 PROCEDURE — 95251 CONT GLUC MNTR ANALYSIS I&R: CPT | Performed by: INTERNAL MEDICINE

## 2022-09-28 PROCEDURE — 83036 HEMOGLOBIN GLYCOSYLATED A1C: CPT | Performed by: INTERNAL MEDICINE

## 2022-09-28 PROCEDURE — 80061 LIPID PANEL: CPT | Performed by: INTERNAL MEDICINE

## 2022-09-28 PROCEDURE — 99214 OFFICE O/P EST MOD 30 MIN: CPT | Performed by: INTERNAL MEDICINE

## 2022-09-28 PROCEDURE — 80053 COMPREHEN METABOLIC PANEL: CPT | Performed by: INTERNAL MEDICINE

## 2022-09-29 LAB
ALBUMIN SERPL-MCNC: 4.1 G/DL (ref 3.5–5.2)
ALBUMIN/GLOB SERPL: 1.6 G/DL
ALP SERPL-CCNC: 59 U/L (ref 39–117)
ALT SERPL W P-5'-P-CCNC: 24 U/L (ref 1–33)
ANION GAP SERPL CALCULATED.3IONS-SCNC: 10.5 MMOL/L (ref 5–15)
AST SERPL-CCNC: 22 U/L (ref 1–32)
BILIRUB SERPL-MCNC: 0.6 MG/DL (ref 0–1.2)
BUN SERPL-MCNC: 18 MG/DL (ref 6–20)
BUN/CREAT SERPL: 21.4 (ref 7–25)
CALCIUM SPEC-SCNC: 8 MG/DL (ref 8.6–10.5)
CHLORIDE SERPL-SCNC: 101 MMOL/L (ref 98–107)
CHOLEST SERPL-MCNC: 156 MG/DL (ref 0–200)
CO2 SERPL-SCNC: 26.5 MMOL/L (ref 22–29)
CREAT SERPL-MCNC: 0.84 MG/DL (ref 0.57–1)
EGFRCR SERPLBLD CKD-EPI 2021: 89.1 ML/MIN/1.73
GLOBULIN UR ELPH-MCNC: 2.5 GM/DL
GLUCOSE SERPL-MCNC: 159 MG/DL (ref 65–99)
HDLC SERPL-MCNC: 28 MG/DL (ref 40–60)
LDLC SERPL CALC-MCNC: 60 MG/DL (ref 0–100)
LDLC/HDLC SERPL: 1.41 {RATIO}
POTASSIUM SERPL-SCNC: 4.1 MMOL/L (ref 3.5–5.2)
PROT SERPL-MCNC: 6.6 G/DL (ref 6–8.5)
SODIUM SERPL-SCNC: 138 MMOL/L (ref 136–145)
TRIGL SERPL-MCNC: 442 MG/DL (ref 0–150)
URATE SERPL-MCNC: 3.8 MG/DL (ref 2.4–5.7)
VLDLC SERPL-MCNC: 68 MG/DL (ref 5–40)

## 2022-10-10 ENCOUNTER — OFFICE VISIT (OUTPATIENT)
Dept: BARIATRICS/WEIGHT MGMT | Facility: CLINIC | Age: 42
End: 2022-10-10

## 2022-10-10 VITALS
RESPIRATION RATE: 18 BRPM | WEIGHT: 243.5 LBS | DIASTOLIC BLOOD PRESSURE: 88 MMHG | HEIGHT: 64 IN | OXYGEN SATURATION: 98 % | SYSTOLIC BLOOD PRESSURE: 134 MMHG | TEMPERATURE: 98 F | HEART RATE: 82 BPM | BODY MASS INDEX: 41.57 KG/M2

## 2022-10-10 DIAGNOSIS — E55.9 HYPOVITAMINOSIS D: ICD-10-CM

## 2022-10-10 DIAGNOSIS — E66.01 OBESITY, CLASS III, BMI 40-49.9 (MORBID OBESITY): Primary | ICD-10-CM

## 2022-10-10 DIAGNOSIS — Z13.21 MALNUTRITION SCREEN: ICD-10-CM

## 2022-10-10 DIAGNOSIS — Z98.84 STATUS POST BARIATRIC SURGERY: ICD-10-CM

## 2022-10-10 DIAGNOSIS — K91.2 POSTGASTRECTOMY MALABSORPTION: ICD-10-CM

## 2022-10-10 DIAGNOSIS — Z13.0 SCREENING, IRON DEFICIENCY ANEMIA: ICD-10-CM

## 2022-10-10 DIAGNOSIS — R53.83 OTHER FATIGUE: ICD-10-CM

## 2022-10-10 DIAGNOSIS — Z90.3 POSTGASTRECTOMY MALABSORPTION: ICD-10-CM

## 2022-10-10 PROCEDURE — 99024 POSTOP FOLLOW-UP VISIT: CPT | Performed by: PHYSICIAN ASSISTANT

## 2022-10-10 NOTE — PROGRESS NOTES
[facility] Bariatric Surgery  [address]    [facility] [id number] [phone]      Patient Name:  Natty Fuentes.  :  1980      Reason for Visit:  1 month postop    HPI:  Natty Fuentes is a 42 y.o. female s/p LSG/HHR 22 GDW     Doing well.  No issues/concerns. Denies dysphagia, reflux, nausea, vomiting, abdominal pain, pulmonary issues and fevers.  Tolerating diet progression - on stage 6.  Getting 100g prot/day.  Eating 3 times a day, supplements with shakes.  Drinking 64+ fluid oz/day.  Taking MVI, B12, B1, Calcium, Vit D, iron and Vit C.  On Pantoprazole.  Still holding ASA , NSAIDs , Tramadol, Hormones, Diuretics , Steroids and Immunologics.  Exercising-  Walking, pelaton.     Presurgery weight:  282 pounds. Today's weight is 110 kg (243 lb 8 oz) pounds, today's Body mass index is 41.8 kg/m²., and@ weight loss since surgery is 39 pounds.       Past Medical History:   Diagnosis Date   • Acid reflux     controlled on protonix 40mg daily, EGD  with ulcer, schlatzis ring   • Arthritis     CRP/ sed rate elevated, sees rheumatologist, questionable AI disease   • Fatigue    • Gastroparesis    • Goiter 2016   • Gout     has prednisone prn to take for flares, last 2019   • Hiatal hernia with gastroesophageal reflux     EGD Dr. Salgado 2021, upper GI 2021   • Hip arthrosis 2017   • Hirsutism 2000   • Hyperlipidemia    • Hypertension    • Hypocalcemia    • Hyponatremia 22    After having nausea and vomiting for several days was noted with low sodium.   • Hypoparathyroidism (HCC)     on cacium supplements and Vit D   • Knee swelling 2021   • Lower extremity edema    • Microcytic erythrocytes    • Morbid obesity with BMI of 45.0-49.9, adult (HCC)    • Neuromuscular disorder (HCC)    • Obesity    • Polycystic ovary syndrome 2000   • Renal calculi    • S/P rukhsana     gallstones   • Sleep apnea     CPAP compliant   • Thyroid nodule  04/2016   • Type 2 diabetes mellitus (HCC)     dx 2001, on insulin since 1/2021, last A1C 6.8, sees endocrine   • Vitamin D deficiency 04/2016     Past Surgical History:   Procedure Laterality Date   • COLONOSCOPY  2019   • ELBOW PROCEDURE Left 06/2001    Surgery done after fall with fracture of left elbow.   • ENDOSCOPY N/A 9/8/2022    Procedure: ESOPHAGOGASTRODUODENOSCOPY;  Surgeon: Evans Salgado MD;  Location:  [facility];  Service: Bariatric;  Laterality: N/A;   • FOOT SURGERY Right     Cyst 2003   • GASTRECTOMY N/A 9/8/2022    Procedure: GASTRECTOMY LAPAROSCOPIC;  Surgeon: Evans Salgado MD;  Location:  [facility];  Service: Bariatric;  Laterality: N/A;   • HIATAL HERNIA REPAIR N/A 9/8/2022    Procedure: HIATAL HERNIA REPAIR LAPAROSCOPIC;  Surgeon: Evans Salgado MD;  Location:  [facility];  Service: Bariatric;  Laterality: N/A;  [id number]; resumed [id number]   • LAPAROSCOPIC CHOLECYSTECTOMY  2006    gallstones     Outpatient Medications Marked as Taking for the 10/10/22 encounter (Office Visit) with Parris Sanchez PA-C   Medication Sig Dispense Refill   • allopurinol (ZYLOPRIM) 300 MG tablet Take 300 mg by mouth Daily.     • B-D ULTRAFINE III SHORT PEN 31G X 8 MM misc USE 1 NEW PEN NEEDLE 5 TIMES DAILY 150 each 5   • Blood Glucose Monitoring Suppl (True Metrix Meter) device 1 Device Take As Directed. 1 Device 0   • calcitriol (ROCALTROL) 0.25 MCG capsule TAKE 2 CAPSULES BY MOUTH EVERY DAY 60 capsule 11   • Calcium Carb-Cholecalciferol 600-200 MG-UNIT tablet Take 600 mg by mouth 4 (Four) Times a Day.     • Continuous Blood Gluc Sensor (Dexcom G6 Sensor) USE AS DIRECTED EVERY TEN DAYS 3 each 0   • Continuous Blood Gluc Transmit (Dexcom G6 Transmitter) misc USE AS DIRECTED 1 each 0   • fenofibrate (TRICOR) 145 MG tablet TAKE ONE TABLET BY MOUTH EVERY DAY 90 tablet 3   • Lancets 30G misc True Metrix Test 5 times dailt 360 each 3   • lisinopril (PRINIVIL,ZESTRIL) 40 MG tablet Take 1 tablet by mouth  "Daily.     • loratadine (CLARITIN) 10 MG tablet Take 10 mg by mouth Daily.     • metoprolol succinate XL (TOPROL-XL) 25 MG 24 hr tablet Take 2 tablets by mouth Daily. (Patient taking differently: Take 1 tablet by mouth Daily.) 180 tablet 3   • pantoprazole (PROTONIX) 40 MG EC tablet Take 40 mg by mouth Daily.     • POTASSIUM CITRATE ER PO Take 10 mEq by mouth 3 (Three) Times a Day.     • pravastatin (PRAVACHOL) 40 MG tablet Take 1 tablet by mouth Daily. 90 tablet 3   • True Metrix Blood Glucose Test test strip 1 each by Other route 5 (Five) Times a Day. Use as instructed 150 each 5     Allergies   Allergen Reactions   • Penicillins Rash   • Rocephin [Ceftriaxone] Rash       Social History     Socioeconomic History   • Marital status: Single   Tobacco Use   • Smoking status: Never   • Smokeless tobacco: Never   Vaping Use   • Vaping Use: Never used   Substance and Sexual Activity   • Alcohol use: Never   • Drug use: Never   • Sexual activity: Not Currently       /88 (BP Location: Left arm, Patient Position: Sitting)   Pulse 82   Temp 98 °F (36.7 °C)   Resp 18   Ht 162.6 cm (64\")   Wt 110 kg (243 lb 8 oz)   SpO2 98%   BMI 41.80 kg/m²     Physical Exam  Constitutional:       Appearance: She is well-developed. She is obese.   HENT:      Head: Normocephalic and atraumatic.   Cardiovascular:      Rate and Rhythm: Normal rate and regular rhythm.   Pulmonary:      Effort: Pulmonary effort is normal.      Breath sounds: Normal breath sounds.   Abdominal:      General: Bowel sounds are normal.      Palpations: Abdomen is soft.      Comments: Incisions healing well   Skin:     General: Skin is warm and dry.   Neurological:      Mental Status: She is alert.   Psychiatric:         Mood and Affect: Mood normal.         Behavior: Behavior normal.         Thought Content: Thought content normal.         Judgment: Judgment normal.           Assessment:  1 month s/p LSG/HHR 9/8/22 GDW     ICD-10-CM ICD-9-CM   1. Obesity, " Class III, BMI 40-49.9 (morbid obesity) ([facility])  E66.01 278.01   2. Status post bariatric surgery  Z98.84 V45.86   3. Other fatigue  R53.83 780.79   4. Hypovitaminosis D  E55.9 268.9   5. Screening, iron deficiency anemia  Z13.0 V78.0   6. Malnutrition screen  Z13.21 V77.2   7. Postgastrectomy malabsorption  K91.2 579.3    Z90.3          Plan:  Doing well.  Continue to advance diet per manual.  Continue protein 70-100g/day.  Encouraged good food choices - high protein, low carb.  Continue fluids 64oz per day. Continue routine exercise, lifting restrictions lifted.  Continue PPI. Continue to avoid NSAIDS, ASA, tramadol, tobacco x 6 weeks postop, steroids x 8 weeks postop.  Routine bariatric labs ordered.  Continue vitamins w/ adjustments pending lab results.  Call w/ problems/concerns.    Class 3 Severe Obesity (BMI >=40). Obesity-related health conditions include the following: as above. Obesity is improving with treatment. BMI is is above average; BMI management plan is completed. We discussed low calorie, low carb based diet program, portion control and increasing exercise.        The patient was instructed to follow up in 2 months, sooner if needed.

## 2022-10-13 LAB
25(OH)D3+25(OH)D2 SERPL-MCNC: 39.9 NG/ML (ref 30–100)
BASOPHILS # BLD AUTO: 0.1 X10E3/UL (ref 0–0.2)
BASOPHILS NFR BLD AUTO: 1 %
EOSINOPHIL # BLD AUTO: 0.2 X10E3/UL (ref 0–0.4)
EOSINOPHIL NFR BLD AUTO: 2 %
ERYTHROCYTE [DISTWIDTH] IN BLOOD BY AUTOMATED COUNT: 16.1 % (ref 11.7–15.4)
FERRITIN SERPL-MCNC: 114 NG/ML (ref 15–150)
FOLATE SERPL-MCNC: 20 NG/ML
HCT VFR BLD AUTO: 41.1 % (ref 34–46.6)
HGB BLD-MCNC: 13 G/DL (ref 11.1–15.9)
IMM GRANULOCYTES # BLD AUTO: 0 X10E3/UL (ref 0–0.1)
IMM GRANULOCYTES NFR BLD AUTO: 0 %
IRON SERPL-MCNC: 40 UG/DL (ref 27–159)
LYMPHOCYTES # BLD AUTO: 2.7 X10E3/UL (ref 0.7–3.1)
LYMPHOCYTES NFR BLD AUTO: 37 %
MCH RBC QN AUTO: 25.4 PG (ref 26.6–33)
MCHC RBC AUTO-ENTMCNC: 31.6 G/DL (ref 31.5–35.7)
MCV RBC AUTO: 80 FL (ref 79–97)
METHYLMALONATE SERPL-SCNC: 109 NMOL/L (ref 0–378)
MONOCYTES # BLD AUTO: 0.6 X10E3/UL (ref 0.1–0.9)
MONOCYTES NFR BLD AUTO: 8 %
NEUTROPHILS # BLD AUTO: 3.8 X10E3/UL (ref 1.4–7)
NEUTROPHILS NFR BLD AUTO: 52 %
PLATELET # BLD AUTO: 267 X10E3/UL (ref 150–450)
PREALB SERPL-MCNC: 23 MG/DL (ref 12–34)
RBC # BLD AUTO: 5.11 X10E6/UL (ref 3.77–5.28)
VIT B1 BLD-SCNC: 213.6 NMOL/L (ref 66.5–200)
WBC # BLD AUTO: 7.4 X10E3/UL (ref 3.4–10.8)

## 2022-10-27 DIAGNOSIS — E11.65 UNCONTROLLED TYPE 2 DIABETES MELLITUS WITH HYPERGLYCEMIA: ICD-10-CM

## 2022-10-27 RX ORDER — PROCHLORPERAZINE 25 MG/1
SUPPOSITORY RECTAL
Qty: 1 EACH | Refills: 0 | Status: SHIPPED | OUTPATIENT
Start: 2022-10-27 | End: 2023-02-02

## 2022-11-14 DIAGNOSIS — E11.65 TYPE 2 DIABETES MELLITUS WITH HYPERGLYCEMIA, WITHOUT LONG-TERM CURRENT USE OF INSULIN: ICD-10-CM

## 2022-11-14 RX ORDER — METOPROLOL SUCCINATE 25 MG/1
25 TABLET, EXTENDED RELEASE ORAL DAILY
Qty: 90 TABLET | Refills: 1 | Status: SHIPPED | OUTPATIENT
Start: 2022-11-14 | End: 2023-03-13 | Stop reason: ALTCHOICE

## 2022-11-27 DIAGNOSIS — E11.9 TYPE 2 DIABETES MELLITUS WITHOUT COMPLICATION, WITHOUT LONG-TERM CURRENT USE OF INSULIN: Primary | ICD-10-CM

## 2022-11-29 ENCOUNTER — TELEPHONE (OUTPATIENT)
Dept: BARIATRICS/WEIGHT MGMT | Facility: CLINIC | Age: 42
End: 2022-11-29

## 2022-11-29 NOTE — TELEPHONE ENCOUNTER
Current weight 229lbs , pt frustrated she has not lost any weight since October 30   Has lost inches-clothing size 3x to loose 2x      20 mins 3/7 peloton-variety and intensity    Breakfast: protein drink/1 egg occasional caballero  Lunch: grilled chicken and veg  Snack: protein drink/sugar free pudding/banana  Dinner: roast beef and veg   Drinks: water, occasional powerade zero    800-1000cals, 20-40g carbs, over 100g protein a day    Plan: could be a normal weight plateau  Increase variety of foods eaten  Begin taking measurements  Reassured pt  Pt to call with further q

## 2022-12-12 ENCOUNTER — OFFICE VISIT (OUTPATIENT)
Dept: BARIATRICS/WEIGHT MGMT | Facility: CLINIC | Age: 42
End: 2022-12-12

## 2022-12-12 VITALS
HEIGHT: 64 IN | RESPIRATION RATE: 18 BRPM | SYSTOLIC BLOOD PRESSURE: 102 MMHG | DIASTOLIC BLOOD PRESSURE: 58 MMHG | WEIGHT: 219.5 LBS | TEMPERATURE: 97.1 F | BODY MASS INDEX: 37.47 KG/M2 | HEART RATE: 64 BPM | OXYGEN SATURATION: 96 %

## 2022-12-12 DIAGNOSIS — Z13.21 MALNUTRITION SCREEN: ICD-10-CM

## 2022-12-12 DIAGNOSIS — E66.9 OBESITY, CLASS II, BMI 35-39.9: Primary | ICD-10-CM

## 2022-12-12 DIAGNOSIS — Z90.3 POSTGASTRECTOMY MALABSORPTION: ICD-10-CM

## 2022-12-12 DIAGNOSIS — R53.83 OTHER FATIGUE: ICD-10-CM

## 2022-12-12 DIAGNOSIS — K91.2 POSTGASTRECTOMY MALABSORPTION: ICD-10-CM

## 2022-12-12 DIAGNOSIS — Z98.84 STATUS POST BARIATRIC SURGERY: ICD-10-CM

## 2022-12-12 DIAGNOSIS — E55.9 HYPOVITAMINOSIS D: ICD-10-CM

## 2022-12-12 PROCEDURE — 99214 OFFICE O/P EST MOD 30 MIN: CPT | Performed by: PHYSICIAN ASSISTANT

## 2022-12-12 NOTE — PROGRESS NOTES
[facility] Bariatric Surgery  [address]    [facility] [id number] [phone]        Patient Name:  Natty Fuentes.  :  1980      Reason for Visit:   3 months postop      HPI: Natty Fuentes is a 42 y.o. female s/p LSG/HHR 22 GDW     Doing well.  Pleased with progress. No issues/concerns. Denies dysphagia, reflux, nausea, vomiting and abdominal pain.  Getting 100+g prot/day. 3 meals occ snack, maybe 1 shake a day.  Drinking close to 64 fluid oz/day.  1 month labs revealed bariatric levels wnl.  Taking MVI, B12, B1, Calcium, Vit D, iron and Vit C. Not on antacid. Exercising- walking, pel[place].     Presurgery weight: 282 pounds.  Today's weight is 99.6 kg (219 lb 8 oz) pounds, today's  Body mass index is 37.68 kg/m²., and@ weight loss since surgery is 63 pounds.      Past Medical History:   Diagnosis Date   • Acid reflux     controlled on protonix 40mg daily, EGD  with ulcer, schlatzis ring   • Arthritis     CRP/ sed rate elevated, sees rheumatologist, questionable AI disease   • Fatigue    • Gastroparesis    • Goiter 2016   • Gout     has prednisone prn to take for flares, last 2019   • Hiatal hernia with gastroesophageal reflux     EGD Dr. Salgado 2021, upper GI 2021   • Hip arthrosis 2017   • Hirsutism 2000   • Hyperlipidemia    • Hypertension    • Hypocalcemia    • Hyponatremia 22    After having nausea and vomiting for several days was noted with low sodium.   • Hypoparathyroidism (HCC)     on cacium supplements and Vit D   • Knee swelling 2021   • Lower extremity edema    • Microcytic erythrocytes    • Morbid obesity with BMI of 45.0-49.9, adult (HCC)    • Neuromuscular disorder (HCC)    • Obesity    • Polycystic ovary syndrome 2000   • Renal calculi    • S/P rukhsana     gallstones   • Sleep apnea     CPAP compliant   • Thyroid nodule 2016   • Type 2 diabetes mellitus (HCC)     dx , on insulin since 2021, last A1C  6.8, sees endocrine   • Vitamin D deficiency 04/2016     Past Surgical History:   Procedure Laterality Date   • COLONOSCOPY  2019   • ELBOW PROCEDURE Left 06/2001    Surgery done after fall with fracture of left elbow.   • ENDOSCOPY N/A 9/8/2022    Procedure: ESOPHAGOGASTRODUODENOSCOPY;  Surgeon: Evans Salgado MD;  Location:  [facility];  Service: Bariatric;  Laterality: N/A;   • FOOT SURGERY Right     Cyst 2003   • GASTRECTOMY N/A 9/8/2022    Procedure: GASTRECTOMY LAPAROSCOPIC;  Surgeon: Evans Salgado MD;  Location:  [facility];  Service: Bariatric;  Laterality: N/A;   • HIATAL HERNIA REPAIR N/A 9/8/2022    Procedure: HIATAL HERNIA REPAIR LAPAROSCOPIC;  Surgeon: Evans Salgado MD;  Location:  [facility];  Service: Bariatric;  Laterality: N/A;  [id number]; resumed [id number]   • LAPAROSCOPIC CHOLECYSTECTOMY  2006    gallstones     Outpatient Medications Marked as Taking for the 12/12/22 encounter (Office Visit) with Parris Sanchez PA-C   Medication Sig Dispense Refill   • allopurinol (ZYLOPRIM) 300 MG tablet Take 300 mg by mouth Daily.     • calcitriol (ROCALTROL) 0.25 MCG capsule TAKE 2 CAPSULES BY MOUTH EVERY DAY 60 capsule 11   • Calcium Carb-Cholecalciferol 600-200 MG-UNIT tablet Take 600 mg by mouth 4 (Four) Times a Day.     • Continuous Blood Gluc Sensor (Dexcom G6 Sensor) USE AS DIRECTED EVERY TEN DAYS 3 each 0   • Continuous Blood Gluc Transmit (Dexcom G6 Transmitter) misc USE AS DIRECTED 1 each 0   • fenofibrate (TRICOR) 145 MG tablet TAKE ONE TABLET BY MOUTH EVERY DAY 90 tablet 3   • lisinopril (PRINIVIL,ZESTRIL) 40 MG tablet Take 1 tablet by mouth Daily.     • loratadine (CLARITIN) 10 MG tablet Take 10 mg by mouth Daily.     • metoprolol succinate XL (Toprol XL) 25 MG 24 hr tablet Take 1 tablet by mouth Daily. 90 tablet 1   • pravastatin (PRAVACHOL) 40 MG tablet Take 1 tablet by mouth Daily. 90 tablet 3       Allergies   Allergen Reactions   • Penicillins Rash   • Rocephin [Ceftriaxone]  "Rash       Social History     Socioeconomic History   • Marital status: Single   Tobacco Use   • Smoking status: Never   • Smokeless tobacco: Never   Vaping Use   • Vaping Use: Never used   Substance and Sexual Activity   • Alcohol use: Never   • Drug use: Never   • Sexual activity: Not Currently       /58 (BP Location: Left arm, Patient Position: Sitting)   Pulse 64   Temp 97.1 °F (36.2 °C)   Resp 18   Ht 162.6 cm (64\")   Wt 99.6 kg (219 lb 8 oz)   SpO2 96%   BMI 37.68 kg/m²     Physical Exam  Constitutional:       Appearance: She is well-developed. She is obese.   HENT:      Head: Normocephalic and atraumatic.   Cardiovascular:      Rate and Rhythm: Normal rate and regular rhythm.   Pulmonary:      Effort: Pulmonary effort is normal.      Breath sounds: Normal breath sounds.   Abdominal:      General: Bowel sounds are normal.      Palpations: Abdomen is soft.      Comments: Incisions well healed   Skin:     General: Skin is warm and dry.   Neurological:      Mental Status: She is alert.   Psychiatric:         Mood and Affect: Mood normal.         Behavior: Behavior normal.         Thought Content: Thought content normal.         Judgment: Judgment normal.           Assessment:  3 months s/p LSG/HHR 9/8/22 GDW     ICD-10-CM ICD-9-CM   1. Obesity, Class II, BMI 35-39.9  E66.9 278.00   2. Other fatigue  R53.83 780.79   3. Status post bariatric surgery  Z98.84 V45.86   4. Hypovitaminosis D  E55.9 268.9   5. Malnutrition screen  Z13.21 V77.2   6. Postgastrectomy malabsorption  K91.2 579.3    Z90.3          Plan:  Doing well. Continue w/ good food choices and healthy habits.  Continue protein 70-100g/day.  Continue fluids 64oz daily. Continue routine exercise.  Routine bariatric labs ordered.  Continue vitamins w/ adjustments pending lab results.  Call w/ problems/concerns.     Class 2 Severe Obesity (BMI >=35 and <=39.9). Obesity-related health conditions include the following: as above. Obesity is " improving with treatment. BMI is is above average; BMI management plan is completed. We discussed low calorie, low carb based diet program, portion control and increasing exercise.        The patient was instructed to follow up in 3 months, sooner if needed.

## 2022-12-16 LAB
25(OH)D3+25(OH)D2 SERPL-MCNC: 46.4 NG/ML (ref 30–100)
ALBUMIN SERPL-MCNC: 4.6 G/DL (ref 3.8–4.8)
ALBUMIN/GLOB SERPL: 1.7 {RATIO} (ref 1.2–2.2)
ALP SERPL-CCNC: 62 IU/L (ref 44–121)
ALT SERPL-CCNC: 21 IU/L (ref 0–32)
AST SERPL-CCNC: 19 IU/L (ref 0–40)
BASOPHILS # BLD AUTO: 0.1 X10E3/UL (ref 0–0.2)
BASOPHILS NFR BLD AUTO: 1 %
BILIRUB SERPL-MCNC: 0.4 MG/DL (ref 0–1.2)
BUN SERPL-MCNC: 28 MG/DL (ref 6–24)
BUN/CREAT SERPL: 29 (ref 9–23)
CALCIUM SERPL-MCNC: 9.4 MG/DL (ref 8.7–10.2)
CHLORIDE SERPL-SCNC: 101 MMOL/L (ref 96–106)
CO2 SERPL-SCNC: 21 MMOL/L (ref 20–29)
CREAT SERPL-MCNC: 0.98 MG/DL (ref 0.57–1)
EGFRCR SERPLBLD CKD-EPI 2021: 73 ML/MIN/1.73
EOSINOPHIL # BLD AUTO: 0.1 X10E3/UL (ref 0–0.4)
EOSINOPHIL NFR BLD AUTO: 2 %
ERYTHROCYTE [DISTWIDTH] IN BLOOD BY AUTOMATED COUNT: 15.1 % (ref 11.7–15.4)
FERRITIN SERPL-MCNC: 210 NG/ML (ref 15–150)
FOLATE SERPL-MCNC: >20 NG/ML
GLOBULIN SER CALC-MCNC: 2.7 G/DL (ref 1.5–4.5)
GLUCOSE SERPL-MCNC: 101 MG/DL (ref 70–99)
HCT VFR BLD AUTO: 40.9 % (ref 34–46.6)
HGB BLD-MCNC: 13.6 G/DL (ref 11.1–15.9)
IMM GRANULOCYTES # BLD AUTO: 0 X10E3/UL (ref 0–0.1)
IMM GRANULOCYTES NFR BLD AUTO: 0 %
IRON SERPL-MCNC: 66 UG/DL (ref 27–159)
LYMPHOCYTES # BLD AUTO: 2.8 X10E3/UL (ref 0.7–3.1)
LYMPHOCYTES NFR BLD AUTO: 41 %
MCH RBC QN AUTO: 26.8 PG (ref 26.6–33)
MCHC RBC AUTO-ENTMCNC: 33.3 G/DL (ref 31.5–35.7)
MCV RBC AUTO: 81 FL (ref 79–97)
METHYLMALONATE SERPL-SCNC: 144 NMOL/L (ref 0–378)
MONOCYTES # BLD AUTO: 0.4 X10E3/UL (ref 0.1–0.9)
MONOCYTES NFR BLD AUTO: 6 %
NEUTROPHILS # BLD AUTO: 3.5 X10E3/UL (ref 1.4–7)
NEUTROPHILS NFR BLD AUTO: 50 %
PLATELET # BLD AUTO: 363 X10E3/UL (ref 150–450)
POTASSIUM SERPL-SCNC: 4.9 MMOL/L (ref 3.5–5.2)
PREALB SERPL-MCNC: 27 MG/DL (ref 12–34)
PROT SERPL-MCNC: 7.3 G/DL (ref 6–8.5)
RBC # BLD AUTO: 5.07 X10E6/UL (ref 3.77–5.28)
SODIUM SERPL-SCNC: 141 MMOL/L (ref 134–144)
VIT B1 BLD-SCNC: 257.8 NMOL/L (ref 66.5–200)
WBC # BLD AUTO: 6.9 X10E3/UL (ref 3.4–10.8)

## 2022-12-30 DIAGNOSIS — E11.65 UNCONTROLLED TYPE 2 DIABETES MELLITUS WITH HYPERGLYCEMIA: ICD-10-CM

## 2022-12-30 RX ORDER — PROCHLORPERAZINE 25 MG/1
SUPPOSITORY RECTAL
Qty: 9 EACH | Refills: 1 | Status: SHIPPED | OUTPATIENT
Start: 2022-12-30 | End: 2023-03-29

## 2023-02-02 DIAGNOSIS — E11.9 TYPE 2 DIABETES MELLITUS WITHOUT COMPLICATION, WITHOUT LONG-TERM CURRENT USE OF INSULIN: Primary | ICD-10-CM

## 2023-02-02 DIAGNOSIS — E11.65 UNCONTROLLED TYPE 2 DIABETES MELLITUS WITH HYPERGLYCEMIA: ICD-10-CM

## 2023-02-02 RX ORDER — PROCHLORPERAZINE 25 MG/1
1 SUPPOSITORY RECTAL
Qty: 1 EACH | Refills: 3 | Status: SHIPPED | OUTPATIENT
Start: 2023-02-02 | End: 2023-03-29

## 2023-02-02 RX ORDER — PROCHLORPERAZINE 25 MG/1
SUPPOSITORY RECTAL
Qty: 1 EACH | Refills: 0 | Status: SHIPPED | OUTPATIENT
Start: 2023-02-02 | End: 2023-02-02 | Stop reason: SDUPTHER

## 2023-02-08 RX ORDER — ERGOCALCIFEROL 1.25 MG/1
CAPSULE ORAL
Qty: 4 CAPSULE | Refills: 11 | Status: SHIPPED | OUTPATIENT
Start: 2023-02-08 | End: 2023-03-13

## 2023-03-13 ENCOUNTER — OFFICE VISIT (OUTPATIENT)
Dept: BARIATRICS/WEIGHT MGMT | Facility: CLINIC | Age: 43
End: 2023-03-13
Payer: COMMERCIAL

## 2023-03-13 VITALS
BODY MASS INDEX: 35 KG/M2 | DIASTOLIC BLOOD PRESSURE: 64 MMHG | TEMPERATURE: 97.3 F | HEART RATE: 77 BPM | SYSTOLIC BLOOD PRESSURE: 104 MMHG | RESPIRATION RATE: 16 BRPM | OXYGEN SATURATION: 99 % | HEIGHT: 64 IN | WEIGHT: 205 LBS

## 2023-03-13 DIAGNOSIS — E11.69 DIABETES MELLITUS TYPE 2 IN OBESE: ICD-10-CM

## 2023-03-13 DIAGNOSIS — E55.9 VITAMIN D DEFICIENCY: ICD-10-CM

## 2023-03-13 DIAGNOSIS — I10 ESSENTIAL HYPERTENSION: ICD-10-CM

## 2023-03-13 DIAGNOSIS — R79.0 ABNORMAL BLOOD LEVEL OF IRON: ICD-10-CM

## 2023-03-13 DIAGNOSIS — K21.9 GASTROESOPHAGEAL REFLUX DISEASE, UNSPECIFIED WHETHER ESOPHAGITIS PRESENT: ICD-10-CM

## 2023-03-13 DIAGNOSIS — R60.0 LOWER EXTREMITY EDEMA: ICD-10-CM

## 2023-03-13 DIAGNOSIS — E66.9 DIABETES MELLITUS TYPE 2 IN OBESE: ICD-10-CM

## 2023-03-13 DIAGNOSIS — E66.9 OBESITY, CLASS II, BMI 35-39.9: Primary | ICD-10-CM

## 2023-03-13 DIAGNOSIS — R53.83 FATIGUE, UNSPECIFIED TYPE: ICD-10-CM

## 2023-03-13 PROCEDURE — 99214 OFFICE O/P EST MOD 30 MIN: CPT | Performed by: PHYSICIAN ASSISTANT

## 2023-03-13 NOTE — PROGRESS NOTES
[facility] Bariatric Surgery  [address]    [facility] [id number] [phone]        Patient Name:  Natty Fuentes  :  1980      Date of Visit: 2023      Reason for Visit:   6 months postop      HPI: Natty Fuentes is a 42 y.o. female s/p LSG/HHR 22 GDW    Doing well.  No issues/concerns.  Denies dysphagia, reflux, nausea, vomiting and abdominal pain.  Getting 100g prot/day.  No longer taking/needing PPI.  Physical activity: 5-6x/week.     Bariatric labs 22 - high ferritin, high BUN.  Taking MVI, B12, B1, Calcium, Vit D, and iron +C.    Presurgery weight: 282 pounds.  Today's weight is 93 kg (205 lb) pounds, today's  Body mass index is 35.19 kg/m²., and weight loss since surgery is 77 pounds.        Past Medical History:   Diagnosis Date   • Acid reflux     controlled on protonix 40mg daily, EGD  with ulcer, schlatzis ring   • Arthritis     CRP/ sed rate elevated, sees rheumatologist, questionable AI disease   • Fatigue    • Gastroparesis    • Goiter 2016   • Gout     has prednisone prn to take for flares, last    • Hiatal hernia with gastroesophageal reflux     EGD Dr. Salgado 2021, upper GI 2021   • Hip arthrosis 2017   • Hirsutism 2000   • Hyperlipidemia    • Hypertension    • Hypocalcemia    • Hyponatremia 22    After having nausea and vomiting for several days was noted with low sodium.   • Hypoparathyroidism (HCC)     on cacium supplements and Vit D   • Knee swelling 2021   • Lower extremity edema    • Microcytic erythrocytes    • Morbid obesity with BMI of 45.0-49.9, adult ([facility])    • Neuromuscular disorder (HCC)    • Obesity    • Polycystic ovary syndrome 2000   • Renal calculi    • S/P rukhsana     gallstones   • Sleep apnea     CPAP compliant   • Thyroid nodule 2016   • Type 2 diabetes mellitus (HCC)     dx , on insulin since 2021, last A1C 6.8, sees endocrine   • Vitamin D deficiency 2016      Past Surgical History:   Procedure Laterality Date   • COLONOSCOPY  2019   • ELBOW PROCEDURE Left 06/2001    Surgery done after fall with fracture of left elbow.   • ENDOSCOPY N/A 9/8/2022    Procedure: ESOPHAGOGASTRODUODENOSCOPY;  Surgeon: Evans Salgado MD;  Location:  [facility];  Service: Bariatric;  Laterality: N/A;   • FOOT SURGERY Right     Cyst 2003   • GASTRECTOMY N/A 9/8/2022    Procedure: GASTRECTOMY LAPAROSCOPIC;  Surgeon: Evans Salgado MD;  Location:  [facility];  Service: Bariatric;  Laterality: N/A;   • HIATAL HERNIA REPAIR N/A 9/8/2022    Procedure: HIATAL HERNIA REPAIR LAPAROSCOPIC;  Surgeon: Evans Salgado MD;  Location:  [facility];  Service: Bariatric;  Laterality: N/A;  [id number]; resumed [id number]   • LAPAROSCOPIC CHOLECYSTECTOMY  2006    gallstones     Outpatient Medications Marked as Taking for the 3/13/23 encounter (Office Visit) with Charlette Alcantara PA   Medication Sig Dispense Refill   • allopurinol (ZYLOPRIM) 300 MG tablet Take 1 tablet by mouth Daily.     • B-D ULTRAFINE III SHORT PEN 31G X 8 MM misc USE 1 NEW PEN NEEDLE 5 TIMES DAILY 150 each 5   • Blood Glucose Monitoring Suppl (True Metrix Meter) device 1 Device Take As Directed. 1 Device 0   • calcitriol (ROCALTROL) 0.25 MCG capsule TAKE 2 CAPSULES BY MOUTH EVERY DAY 60 capsule 11   • Calcium Carb-Cholecalciferol 600-200 MG-UNIT tablet Take 600 mg by mouth 4 (Four) Times a Day.     • Continuous Blood Gluc Sensor (Dexcom G6 Sensor) CHANGE SENSOR EVERY 10 DAYS 9 each 1   • Continuous Blood Gluc Transmit (Dexcom G6 Transmitter) misc 1 each by Other route Every 3 (Three) Months. 1 each 3   • fenofibrate (TRICOR) 145 MG tablet TAKE ONE TABLET BY MOUTH EVERY DAY 90 tablet 3   • Lancets 30G misc True Metrix Test 5 times dailt 360 each 3   • lisinopril (PRINIVIL,ZESTRIL) 40 MG tablet Take 1 tablet by mouth Daily. (Patient taking differently: Take 20 mg by mouth Daily.)     • loratadine (CLARITIN) 10 MG tablet Take 1  "tablet by mouth Daily.     • metFORMIN (GLUCOPHAGE) 1000 MG tablet Take 1 tablet by mouth 2 (Two) Times a Day With Meals. 60 tablet 5   • ondansetron (Zofran) 4 MG tablet Take 1 tablet by mouth Every 4 (Four) Hours As Needed for Nausea. 10 tablet 0   • pravastatin (PRAVACHOL) 40 MG tablet Take 1 tablet by mouth Daily. 90 tablet 3   • True Metrix Blood Glucose Test test strip 1 each by Other route 5 (Five) Times a Day. Use as instructed 150 each 5   • [DISCONTINUED] vitamin D (ERGOCALCIFEROL) 1.25 MG (57418 UT) capsule capsule Take 1 capsule by mouth 1 (One) Time Per Week for 10 doses. 4 capsule 11       Allergies   Allergen Reactions   • Penicillins Rash   • Rocephin [Ceftriaxone] Rash       Social History     Socioeconomic History   • Marital status: Single   Tobacco Use   • Smoking status: Never   • Smokeless tobacco: Never   Vaping Use   • Vaping Use: Never used   Substance and Sexual Activity   • Alcohol use: Never   • Drug use: Never   • Sexual activity: Not Currently     Social History     Social History Narrative    Pt is a 40 yr old female, single with 0 children living in [place], [place]. Pt works full time at [email or web address].       /64 (BP Location: Left arm, Patient Position: Sitting)   Pulse 77   Temp 97.3 °F (36.3 °C)   Resp 16   Ht 162.6 cm (64\")   Wt 93 kg (205 lb)   SpO2 99%   BMI 35.19 kg/m²     Physical Exam  Constitutional:       Appearance: She is well-developed.      Comments: wearing a mask   Cardiovascular:      Rate and Rhythm: Normal rate.   Pulmonary:      Effort: Pulmonary effort is normal.   Musculoskeletal:         General: Normal range of motion.   Neurological:      Mental Status: She is alert.   Psychiatric:         Thought Content: Thought content normal.         Judgment: Judgment normal.           Assessment:  6 months s/p LSG/HHR 9/8/22 GDW    ICD-10-CM ICD-9-CM   1. Obesity, Class II, BMI 35-39.9  E66.9 278.00   2. Diabetes mellitus type 2 in obese " (HCC)  E11.69 250.00    E66.9 278.00   3. Essential hypertension  I10 401.9   4. Gastroesophageal reflux disease, unspecified whether esophagitis present  K21.9 530.81   5. Lower extremity edema  R60.0 782.3   6. Fatigue, unspecified type  R53.83 780.79   7. Vitamin D deficiency  E55.9 268.9   8. Abnormal blood level of iron  R79.0 790.6       Class 2 Severe Obesity (BMI >=35 and <=39.9). Obesity-related health conditions include the following: see above. Obesity is improving with treatment. BMI is is above average; BMI management plan is completed. We discussed see plan.      Plan:  Continue w/ good food choices and healthy habits.  Continue to focus on high protein, low carb.  Keep tracking intake w/ goal of protein 100g/day, carbs <100g/day and 1200 toya/day.  Continue routine physical activity.  Routine bariatric labs ordered.  Continue vitamins w/ adjustments pending lab results - will likely simplify.  Call w/ problems/concerns.     The patient was instructed to follow up in 3 months, sooner if needed.      ANMOL Zhong      I spent 30 minutes on this patient encounter, which includes chart review/documentation, evaluation & management, patient education and counseling r/t healthy habits and necessary dietary/lifestyle modifications for continued success/weight loss.

## 2023-03-16 LAB
25(OH)D3+25(OH)D2 SERPL-MCNC: 51.6 NG/ML (ref 30–100)
ALBUMIN SERPL-MCNC: 4.4 G/DL (ref 3.8–4.8)
ALBUMIN/GLOB SERPL: 1.7 {RATIO} (ref 1.2–2.2)
ALP SERPL-CCNC: 58 IU/L (ref 44–121)
ALT SERPL-CCNC: 15 IU/L (ref 0–32)
AST SERPL-CCNC: 17 IU/L (ref 0–40)
BASOPHILS # BLD AUTO: 0.1 X10E3/UL (ref 0–0.2)
BASOPHILS NFR BLD AUTO: 1 %
BILIRUB SERPL-MCNC: 0.6 MG/DL (ref 0–1.2)
BUN SERPL-MCNC: 27 MG/DL (ref 6–24)
BUN/CREAT SERPL: 31 (ref 9–23)
CALCIUM SERPL-MCNC: 8.9 MG/DL (ref 8.7–10.2)
CHLORIDE SERPL-SCNC: 101 MMOL/L (ref 96–106)
CO2 SERPL-SCNC: 23 MMOL/L (ref 20–29)
CREAT SERPL-MCNC: 0.86 MG/DL (ref 0.57–1)
EGFRCR SERPLBLD CKD-EPI 2021: 86 ML/MIN/1.73
EOSINOPHIL # BLD AUTO: 0.2 X10E3/UL (ref 0–0.4)
EOSINOPHIL NFR BLD AUTO: 2 %
ERYTHROCYTE [DISTWIDTH] IN BLOOD BY AUTOMATED COUNT: 13.5 % (ref 11.7–15.4)
FERRITIN SERPL-MCNC: 115 NG/ML (ref 15–150)
FOLATE SERPL-MCNC: 19.8 NG/ML
GLOBULIN SER CALC-MCNC: 2.6 G/DL (ref 1.5–4.5)
GLUCOSE SERPL-MCNC: 101 MG/DL (ref 70–99)
HCT VFR BLD AUTO: 37.6 % (ref 34–46.6)
HGB BLD-MCNC: 13 G/DL (ref 11.1–15.9)
IMM GRANULOCYTES # BLD AUTO: 0 X10E3/UL (ref 0–0.1)
IMM GRANULOCYTES NFR BLD AUTO: 0 %
IRON SERPL-MCNC: 54 UG/DL (ref 27–159)
LYMPHOCYTES # BLD AUTO: 3.1 X10E3/UL (ref 0.7–3.1)
LYMPHOCYTES NFR BLD AUTO: 40 %
MCH RBC QN AUTO: 28.7 PG (ref 26.6–33)
MCHC RBC AUTO-ENTMCNC: 34.6 G/DL (ref 31.5–35.7)
MCV RBC AUTO: 83 FL (ref 79–97)
METHYLMALONATE SERPL-SCNC: 102 NMOL/L (ref 0–378)
MONOCYTES # BLD AUTO: 0.7 X10E3/UL (ref 0.1–0.9)
MONOCYTES NFR BLD AUTO: 8 %
NEUTROPHILS # BLD AUTO: 3.7 X10E3/UL (ref 1.4–7)
NEUTROPHILS NFR BLD AUTO: 49 %
PLATELET # BLD AUTO: 257 X10E3/UL (ref 150–450)
POTASSIUM SERPL-SCNC: 4 MMOL/L (ref 3.5–5.2)
PREALB SERPL-MCNC: 27 MG/DL (ref 12–34)
PROT SERPL-MCNC: 7 G/DL (ref 6–8.5)
RBC # BLD AUTO: 4.53 X10E6/UL (ref 3.77–5.28)
SODIUM SERPL-SCNC: 142 MMOL/L (ref 134–144)
VIT B1 BLD-SCNC: 304.1 NMOL/L (ref 66.5–200)
WBC # BLD AUTO: 7.7 X10E3/UL (ref 3.4–10.8)

## 2023-03-29 ENCOUNTER — OFFICE VISIT (OUTPATIENT)
Dept: ENDOCRINOLOGY | Facility: CLINIC | Age: 43
End: 2023-03-29
Payer: COMMERCIAL

## 2023-03-29 VITALS
SYSTOLIC BLOOD PRESSURE: 124 MMHG | BODY MASS INDEX: 34.66 KG/M2 | DIASTOLIC BLOOD PRESSURE: 62 MMHG | OXYGEN SATURATION: 98 % | HEART RATE: 74 BPM | WEIGHT: 203 LBS | HEIGHT: 64 IN

## 2023-03-29 DIAGNOSIS — E11.9 TYPE 2 DIABETES MELLITUS WITHOUT COMPLICATION, WITHOUT LONG-TERM CURRENT USE OF INSULIN: Primary | ICD-10-CM

## 2023-03-29 DIAGNOSIS — E20.0 IDIOPATHIC HYPOPARATHYROIDISM: ICD-10-CM

## 2023-03-29 DIAGNOSIS — E78.2 MIXED HYPERLIPIDEMIA: ICD-10-CM

## 2023-03-29 DIAGNOSIS — I10 ESSENTIAL HYPERTENSION: ICD-10-CM

## 2023-03-29 LAB
EXPIRATION DATE: NORMAL
GLUCOSE BLDC GLUCOMTR-MCNC: 104 MG/DL (ref 70–130)
HBA1C MFR BLD: 5.4 %
Lab: NORMAL

## 2023-03-29 PROCEDURE — 82947 ASSAY GLUCOSE BLOOD QUANT: CPT | Performed by: INTERNAL MEDICINE

## 2023-03-29 PROCEDURE — 83036 HEMOGLOBIN GLYCOSYLATED A1C: CPT | Performed by: INTERNAL MEDICINE

## 2023-03-29 PROCEDURE — 99214 OFFICE O/P EST MOD 30 MIN: CPT | Performed by: INTERNAL MEDICINE

## 2023-03-29 NOTE — PROGRESS NOTES
Chief Complaint   Patient presents with   • Diabetes     Follow up      HPI:   Natty Fuentes is a 42 y.o.female who returns to Endocrine Clinic for f/u evaluation of multiple medical problems. Last visit 09/28/2022.  Her history is as follows:    Interim Events:   - Is s/p sleeve gastrectomy at  on 09/08/2022. Pt has lost a total 80 lbs.     1) idiopathic hypoparathyroidism:  - diagnosed in 2011 after presenting with symptomatic hypocalcemia  - etiology presumed infiltrative vs autoimmune hypoparathyroidism  - Taking calcitriol 0.25 mcg BID, Caltrate + D 600 mg BID w/ food  - HCTZ 25 mg daily for HTN and to reduce hypercalciuria   - last 24 hr urine calcium, Cr: (09/2020): Calcium 175 mg/24 hr, Cr 2.10 gm/24 hr (0.60 - 1.80), urine volume 2,750 mL    2) Type 2 DM with no associated complications at this time:  - diagnosed in 2013  - did not tolerate Ozempic, Victoza, Trulicity   - did not tolerate Januvia   - Started insulin in 01/2020 after hospitalization for pneumonia  - Tolerated Rybelsus 3 mg only, not higher doses  - Had recurrent vaginal candidiasis on Jardiance  - -Is s/p sleeve gastrectomy at  on 09/08/2022. Pt has lost a total 80 lbs. Was able to stop most DM medications    Current Medications:  Metformin 1000 mg BID    Glucometer / BG Log: No readings for review today    DM Health Maintenance:  Ophtho: Last visit - 2017, no retinopathy per pt  Podiatry: Last visit - no recent visit  Monofilament / Foot exam: DUE  Lipids: see below  Urine microalbumin/Cr ratio: (03/2021) normal  TSH: (01/2023) normal at 1.420  CBC: (03/2023) WNL, Hgb 13.0  CMP: (03/2023): Cr 0.86, GFR 86,, LFTs WNL, Ca 8.9  Aspirin: 81 mg daily - per her PCP, not indicated per ADA guidelines    3) Essential HTN:  - pt taking lisinopril 40 mg daily, metoprolol XL 25 mg daily  - past evaluation (2016) for secondary causes of HTN showed no endocrine pathology (aldosterone/renin -normal, 1 mg dex suppression test -normal)  - States  "that carvediolol caused pulse to be too low, lightheadedness. Tolerating metoprolol XL     4) mixed hyperlipidemia:   Lipid Panel (01/2023): Tchol 153,  (on fenofibrate), HDL 34, LDL 47  - taking pravastatin 40 mg, fenofibrate 145 mg daily,     5) nontoxic nodular goiter:   (10/04/2011): Pt had a thyroid US and US-Guided FNA of a right lobe/isthmus nodule by  Endocrinology. The US report described a \" nodule measuring 1.74 x 1.06 x 1.31 cm in size is complex, hypoechoic, with indistinct borders. A few small calcifications without coma tail. It has a complex cystic-solid consistency and a grade II vascularity.\"    (10/04/2011): Cytology Report: \"DIAGNOSIS ULTRASOUND GUIDED FNA, RIGHT THYROID NODULE:  ABUNDANT COLLOID, RARE MACROPHAGES, AND SCANT FOLLICULAR GROUPS\"    - Repeat thyroid US was completed in clinic 03/2019:  The thyroid gland was mildly enlarged in size by measured dimensions. Finding are consistent with nodular goiter. No discrete nodules were identified in the left lobe. At the junction of the right lobe and isthmus, a 0.99(L) x 0.68(AP) x 0.80(T) cm hypoechoic mixed cystic and solid nodule was identified. The nodule's margin was not well defined. The nodule had minimal peripheral vascularity and no microcalcifications. Course linear calcifications were present. Although imaging was not available for comparison, the nodule appeared to be significantly smaller compared to the outside US report description from 10/2011 and had similar imaging characteristics. Two slightly enlarged left, level III cervical lymph nodes were identified with US characteristics suggestive of benign lymph nodes as described above. No pathologic lymph nodes were seen. Specifically, no superficial mass or enlarged lymph nodes were noted in the left supraclavicular area where patient describes pain and swelling.   RECOMMENDATIONS: Repeat Thyroid US recommended if changes in the gland/neck are noted on physical " "exam    Other medical history:   - gout,   - SANYA on CPAP,   - GERD     Review of Systems   Constitutional: Negative.         Wt loss, after bariatric surgery   HENT: Negative.  Negative for sore throat and trouble swallowing.    Eyes: Negative.    Respiratory: Negative.    Cardiovascular: Negative.    Gastrointestinal: Negative.    Endocrine: Negative.    Genitourinary: Negative.    Musculoskeletal: Positive for arthralgias.   Skin: Negative.    Allergic/Immunologic: Negative.    Neurological: Negative.    Hematological: Negative.    Psychiatric/Behavioral: Negative.      The following portions of the patient's history were reviewed and updated as appropriate: allergies, current medications, past family history, past medical history, past social history, past surgical history and problem list.    /62   Pulse 74   Ht 162.6 cm (64\")   Wt 92.1 kg (203 lb)   SpO2 98%   BMI 34.84 kg/m²   Physical Exam   Constitutional: She is oriented to person, place, and time. She appears well-developed. No distress.   BMI 34.84   HENT:   Head: Normocephalic.   Eyes: Pupils are equal, round, and reactive to light. Conjunctivae are normal.   Neck: No tracheal deviation present. No thyromegaly present.   No palpable thyroid nodules. No tenderness to palpation     Cardiovascular: Normal rate, regular rhythm and normal heart sounds.   No murmur heard.  Pulmonary/Chest: Effort normal and breath sounds normal. No respiratory distress.   Abdominal: Soft. Normal appearance. There is no abdominal tenderness.   Lymphadenopathy:     She has no cervical adenopathy.   Neurological: She is alert and oriented to person, place, and time. No cranial nerve deficit.   Skin: Skin is warm and dry. She is not diaphoretic. No erythema.   No acanthosis nigricans   Psychiatric: Her behavior is normal.   Vitals reviewed.     LABS/IMAGING:  Outside labs reviewed and summarized in HPI  Office Visit on 03/29/2023   Component Date Value Ref Range Status "   • Hemoglobin A1C 03/29/2023 5.4  % Final   • Lot Number 03/29/2023 [id number]   Final   • Expiration Date 03/29/2023 12/12/24   Final   • Glucose 03/29/2023 104  70 - 130 mg/dL Final     Lab Results   Component Value Date    GLUCOSE 101 (H) 03/13/2023    BUN 27 (H) 03/13/2023    CREATININE 0.86 03/13/2023    EGFRRESULT 86 03/13/2023    EGFR 89.1 09/28/2022    BCR 31 (H) 03/13/2023    K 4.0 03/13/2023    CO2 23 03/13/2023    CALCIUM 8.9 03/13/2023    PROTENTOTREF 7.0 03/13/2023    ALBUMIN 4.4 03/13/2023    BILITOT 0.6 03/13/2023    AST 17 03/13/2023    ALT 15 03/13/2023       ASSESSMENT/PLAN:  1) Type 2 DM w/o complication at this time: controlled, A1C% 5.4 today. Improved glycemic control after weight loss   - Will decrease metformin to 500 mg BID   (may consider stopping at a later date)    2) idiopathic hypoparathyroidism: controlled  - CMP checked 03/2023, Calcium at goal  - d/w pt that goal calcium is 7.0 - 8.5 to minimize calcium-phosphorus product deposition  - continue calcitriol 0.25 mcg BID, Caltrate + D 600 mg BID w/ food  - HCTZ 25 mg daily for HTN and to reduce hypercalciuria      3) mixed hyperlipidemia: fair control  Lipid Panel (01/2023): Tchol 153,  (on fenofibrate), HDL 34, LDL 47  - continue pravastatin 40 mg,   - stop fenofibrate 145 mg daily. Will repeat Lipid panel at next visit and see if needed    4) essential HTN: controlled  - continue Lisinopril 20 mg daily  - HCTZ 25 mg daily    RTC 6 months    Signed: Therese Cintron MD

## 2023-06-19 DIAGNOSIS — E20.0 IDIOPATHIC HYPOPARATHYROIDISM: ICD-10-CM

## 2023-06-19 RX ORDER — CALCITRIOL 0.25 UG/1
CAPSULE, LIQUID FILLED ORAL
Qty: 60 CAPSULE | Refills: 11 | Status: SHIPPED | OUTPATIENT
Start: 2023-06-19

## 2023-06-19 NOTE — TELEPHONE ENCOUNTER
Rx Refill Note  Requested Prescriptions     Pending Prescriptions Disp Refills    calcitriol (ROCALTROL) 0.25 MCG capsule [Pharmacy Med Name: CALCITRIOL 0.25 MCG CAPSULE] 60 capsule 10     Sig: TAKE 2 CAPSULES BY MOUTH EVERY DAY      Last office visit with prescribing clinician: 3/29/2023   Last telemedicine visit with prescribing clinician: Visit date not found   Next office visit with prescribing clinician: 9/26/2023                         Would you like a call back once the refill request has been completed: [] Yes [] No    If the office needs to give you a call back, can they leave a voicemail: [] Yes [] No    Marsha Fuentes CMA  06/19/23, 11:48 EDT

## 2023-08-21 RX ORDER — LANCETS 33 GAUGE
1 EACH MISCELLANEOUS 3 TIMES DAILY
Qty: 100 EACH | Refills: 3 | Status: SHIPPED | OUTPATIENT
Start: 2023-08-21

## 2023-08-22 DIAGNOSIS — E11.9 TYPE 2 DIABETES MELLITUS WITHOUT COMPLICATION, WITHOUT LONG-TERM CURRENT USE OF INSULIN: Primary | ICD-10-CM

## 2023-08-22 RX ORDER — ORAL SEMAGLUTIDE 7 MG/1
7 TABLET ORAL EVERY MORNING
Qty: 30 TABLET | Refills: 5 | Status: SHIPPED | OUTPATIENT
Start: 2023-08-22

## 2023-08-24 ENCOUNTER — HOSPITAL ENCOUNTER (OUTPATIENT)
Dept: GENERAL RADIOLOGY | Facility: HOSPITAL | Age: 43
Discharge: HOME | End: 2023-08-24
Admitting: FAMILY MEDICINE
Payer: COMMERCIAL

## 2023-08-24 ENCOUNTER — LAB (OUTPATIENT)
Dept: LAB | Facility: HOSPITAL | Age: 43
End: 2023-08-24
Payer: COMMERCIAL

## 2023-08-24 ENCOUNTER — TRANSCRIBE ORDERS (OUTPATIENT)
Dept: LAB | Facility: HOSPITAL | Age: 43
End: 2023-08-24
Payer: COMMERCIAL

## 2023-08-24 DIAGNOSIS — E11.00 TYPE II DIABETES MELLITUS WITH HYPEROSMOLARITY, UNCONTROLLED: ICD-10-CM

## 2023-08-24 DIAGNOSIS — E11.65 TYPE II DIABETES MELLITUS WITH HYPEROSMOLARITY, UNCONTROLLED: ICD-10-CM

## 2023-08-24 DIAGNOSIS — G89.29 CHRONIC LEFT SHOULDER PAIN: Primary | ICD-10-CM

## 2023-08-24 DIAGNOSIS — M25.512 CHRONIC LEFT SHOULDER PAIN: ICD-10-CM

## 2023-08-24 DIAGNOSIS — M25.512 CHRONIC LEFT SHOULDER PAIN: Primary | ICD-10-CM

## 2023-08-24 DIAGNOSIS — G89.29 CHRONIC LEFT SHOULDER PAIN: ICD-10-CM

## 2023-08-24 PROCEDURE — 82570 ASSAY OF URINE CREATININE: CPT

## 2023-08-24 PROCEDURE — 73030 X-RAY EXAM OF SHOULDER: CPT

## 2023-08-24 PROCEDURE — 82043 UR ALBUMIN QUANTITATIVE: CPT

## 2023-08-24 PROCEDURE — 36415 COLL VENOUS BLD VENIPUNCTURE: CPT

## 2023-08-24 PROCEDURE — 85025 COMPLETE CBC W/AUTO DIFF WBC: CPT

## 2023-08-24 PROCEDURE — 80053 COMPREHEN METABOLIC PANEL: CPT

## 2023-08-24 PROCEDURE — 83036 HEMOGLOBIN GLYCOSYLATED A1C: CPT

## 2023-08-24 PROCEDURE — 80061 LIPID PANEL: CPT

## 2023-08-25 LAB
ALBUMIN SERPL-MCNC: 4.5 G/DL (ref 3.5–5.2)
ALBUMIN UR-MCNC: 3.6 MG/DL
ALBUMIN/GLOB SERPL: 1.6 G/DL
ALP SERPL-CCNC: 59 U/L (ref 39–117)
ALT SERPL W P-5'-P-CCNC: 13 U/L (ref 1–33)
ANION GAP SERPL CALCULATED.3IONS-SCNC: 15 MMOL/L (ref 5–15)
AST SERPL-CCNC: 15 U/L (ref 1–32)
BASOPHILS # BLD AUTO: 0.08 10*3/MM3 (ref 0–0.2)
BASOPHILS NFR BLD AUTO: 0.9 % (ref 0–1.5)
BILIRUB SERPL-MCNC: 0.7 MG/DL (ref 0–1.2)
BUN SERPL-MCNC: 30 MG/DL (ref 6–20)
BUN/CREAT SERPL: 31.9 (ref 7–25)
CALCIUM SPEC-SCNC: 8.9 MG/DL (ref 8.6–10.5)
CHLORIDE SERPL-SCNC: 99 MMOL/L (ref 98–107)
CHOLEST SERPL-MCNC: 199 MG/DL (ref 0–200)
CO2 SERPL-SCNC: 26 MMOL/L (ref 22–29)
CREAT SERPL-MCNC: 0.94 MG/DL (ref 0.57–1)
CREAT UR-MCNC: 118.2 MG/DL
DEPRECATED RDW RBC AUTO: 39.1 FL (ref 37–54)
EGFRCR SERPLBLD CKD-EPI 2021: 77.4 ML/MIN/1.73
EOSINOPHIL # BLD AUTO: 0.15 10*3/MM3 (ref 0–0.4)
EOSINOPHIL NFR BLD AUTO: 1.7 % (ref 0.3–6.2)
ERYTHROCYTE [DISTWIDTH] IN BLOOD BY AUTOMATED COUNT: 13 % (ref 12.3–15.4)
GLOBULIN UR ELPH-MCNC: 2.8 GM/DL
GLUCOSE SERPL-MCNC: 111 MG/DL (ref 65–99)
HBA1C MFR BLD: 6.6 % (ref 4.8–5.6)
HCT VFR BLD AUTO: 39.7 % (ref 34–46.6)
HDLC SERPL-MCNC: 33 MG/DL (ref 40–60)
HGB BLD-MCNC: 14 G/DL (ref 12–15.9)
IMM GRANULOCYTES # BLD AUTO: 0.06 10*3/MM3 (ref 0–0.05)
IMM GRANULOCYTES NFR BLD AUTO: 0.7 % (ref 0–0.5)
LDLC SERPL CALC-MCNC: 98 MG/DL (ref 0–100)
LDLC/HDLC SERPL: 2.59 {RATIO}
LYMPHOCYTES # BLD AUTO: 3.67 10*3/MM3 (ref 0.7–3.1)
LYMPHOCYTES NFR BLD AUTO: 41 % (ref 19.6–45.3)
MCH RBC QN AUTO: 29.5 PG (ref 26.6–33)
MCHC RBC AUTO-ENTMCNC: 35.3 G/DL (ref 31.5–35.7)
MCV RBC AUTO: 83.8 FL (ref 79–97)
MICROALBUMIN/CREAT UR: 30.5 MG/G
MONOCYTES # BLD AUTO: 0.59 10*3/MM3 (ref 0.1–0.9)
MONOCYTES NFR BLD AUTO: 6.6 % (ref 5–12)
NEUTROPHILS NFR BLD AUTO: 4.4 10*3/MM3 (ref 1.7–7)
NEUTROPHILS NFR BLD AUTO: 49.1 % (ref 42.7–76)
NRBC BLD AUTO-RTO: 0 /100 WBC (ref 0–0.2)
PLATELET # BLD AUTO: 319 10*3/MM3 (ref 140–450)
PMV BLD AUTO: 11.5 FL (ref 6–12)
POTASSIUM SERPL-SCNC: 3.8 MMOL/L (ref 3.5–5.2)
PROT SERPL-MCNC: 7.3 G/DL (ref 6–8.5)
RBC # BLD AUTO: 4.74 10*6/MM3 (ref 3.77–5.28)
SODIUM SERPL-SCNC: 140 MMOL/L (ref 136–145)
TRIGL SERPL-MCNC: 403 MG/DL (ref 0–150)
VLDLC SERPL-MCNC: 68 MG/DL (ref 5–40)
WBC NRBC COR # BLD: 8.95 10*3/MM3 (ref 3.4–10.8)

## 2023-09-13 ENCOUNTER — OFFICE VISIT (OUTPATIENT)
Dept: BARIATRICS/WEIGHT MGMT | Facility: CLINIC | Age: 43
End: 2023-09-13
Payer: COMMERCIAL

## 2023-09-13 VITALS
HEART RATE: 73 BPM | SYSTOLIC BLOOD PRESSURE: 122 MMHG | TEMPERATURE: 98 F | WEIGHT: 202 LBS | HEIGHT: 64 IN | DIASTOLIC BLOOD PRESSURE: 60 MMHG | OXYGEN SATURATION: 98 % | BODY MASS INDEX: 34.49 KG/M2

## 2023-09-13 DIAGNOSIS — K91.2 POSTGASTRECTOMY MALABSORPTION: ICD-10-CM

## 2023-09-13 DIAGNOSIS — R53.83 FATIGUE, UNSPECIFIED TYPE: Primary | ICD-10-CM

## 2023-09-13 DIAGNOSIS — E55.9 HYPOVITAMINOSIS D: ICD-10-CM

## 2023-09-13 DIAGNOSIS — R79.0 ABNORMAL BLOOD LEVEL OF IRON: ICD-10-CM

## 2023-09-13 DIAGNOSIS — Z13.21 MALNUTRITION SCREEN: ICD-10-CM

## 2023-09-13 DIAGNOSIS — Z90.3 POSTGASTRECTOMY MALABSORPTION: ICD-10-CM

## 2023-09-13 PROCEDURE — 99213 OFFICE O/P EST LOW 20 MIN: CPT | Performed by: PHYSICIAN ASSISTANT

## 2023-09-13 NOTE — PROGRESS NOTES
[facility] Bariatric Surgery  [address]    [facility] [id number] [phone]        Patient Name:  Natty Fuentes  :  1980      Date of Visit: 2023      Reason for Visit:   1 year postop      HPI: Natty Fuentes is a 42 y.o. female s/p LSG/HHR 22 GDW     Doing ok. Dad passed away since last visit and she has gotten off track. Only complaint is constipation.  No other issues/concerns. Denies dysphagia, reflux, nausea, vomiting, abdominal pain, hair loss, memory loss, vision changes, and numbness/tingling.  Getting 100g prot/day. Avoiding breads/pasta. Eating meats, eggs, cheese, veggies. Drinking 40-60 fluid oz/day.  6 month labs revealed bariatric levels wnl. Not taking any vitamins. Not on any GI meds. Physical activity: limited currently with shoulder injury.     Presurgery weight: 282 pounds.  Today's weight is 91.6 kg (202 lb) pounds, today's  Body mass index is 34.67 kg/m²., and weight loss since surgery is 80 pounds.      Past Medical History:   Diagnosis Date    Acid reflux     Arthritis     CRP/ sed rate elevated, sees rheumatologist, questionable AI disease    Fatigue     Gastroparesis     Goiter 2016    Gout     has prednisone prn to take for flares, last     Hip arthrosis 2017    Hirsutism 2000    Hyperlipidemia     Hypertension     Hypocalcemia     Hyponatremia 22    After having nausea and vomiting for several days was noted with low sodium.    Hypoparathyroidism     on cacium supplements and Vit D    Knee swelling 2021    Lower extremity edema     Microcytic erythrocytes     Morbid obesity with BMI of 45.0-49.9, adult     Obesity     Polycystic ovary syndrome 2000    Renal calculi     S/P rukhsana     gallstones    Sleep apnea     CPAP compliant    Type 2 diabetes mellitus     dx , on insulin since 2021, last A1C 6.8, sees endocrine     Past Surgical History:   Procedure Laterality Date    COLONOSCOPY       ELBOW PROCEDURE Left 06/2001    Surgery done after fall with fracture of left elbow.    ENDOSCOPY N/A 9/8/2022    Procedure: ESOPHAGOGASTRODUODENOSCOPY;  Surgeon: Evans Salgado MD;  Location:  [facility];  Service: Bariatric;  Laterality: N/A;    FOOT SURGERY Right     Cyst 2003    GASTRECTOMY N/A 9/8/2022    Procedure: GASTRECTOMY LAPAROSCOPIC;  Surgeon: Evans Salgado MD;  Location:  [facility];  Service: Bariatric;  Laterality: N/A;    HIATAL HERNIA REPAIR N/A 9/8/2022    Procedure: HIATAL HERNIA REPAIR LAPAROSCOPIC;  Surgeon: Evans Salgado MD;  Location:  [facility];  Service: Bariatric;  Laterality: N/A;  [id number]; resumed [id number]    LAPAROSCOPIC CHOLECYSTECTOMY  2006    gallstones     Outpatient Medications Marked as Taking for the 9/13/23 encounter (Office Visit) with Amira Langford PA   Medication Sig Dispense Refill    allopurinol (ZYLOPRIM) 300 MG tablet Take 1 tablet by mouth Daily.      Blood Glucose Monitoring Suppl device Using to test glucose. 1 each 0    calcitriol (ROCALTROL) 0.25 MCG capsule TAKE 2 CAPSULES BY MOUTH EVERY DAY 60 capsule 11    Calcium Carb-Cholecalciferol 600-200 MG-UNIT tablet Take 600 mg by mouth 2 (Two) Times a Day.      glucose blood test strip Testing 3 times daily. 100 each 5    hydroCHLOROthiazide (HYDRODIURIL) 25 MG tablet TAKE ONE TABLET BY MOUTH EVERY DAY 30 tablet 5    Lancets 33G misc 1 each 3 (Three) Times a Day. 100 each 3    lisinopril (PRINIVIL,ZESTRIL) 20 MG tablet Take 1 tablet by mouth Daily.      loratadine (CLARITIN) 10 MG tablet Take 1 tablet by mouth Daily.      metFORMIN (GLUCOPHAGE) 500 MG tablet Take 1 tablet by mouth 2 (Two) Times a Day With Meals. 180 tablet 3    pravastatin (PRAVACHOL) 40 MG tablet Take 1 tablet by mouth Daily. 90 tablet 3    Semaglutide (Rybelsus) 7 MG tablet Take 7 mg by mouth Every Morning. With 4 oz of water. Wait 30 min before food or medications 30 tablet 5       Allergies   Allergen Reactions    Penicillins Rash  "   Rocephin [Ceftriaxone] Rash       Social History     Socioeconomic History    Marital status: Single   Tobacco Use    Smoking status: Never    Smokeless tobacco: Never   Vaping Use    Vaping Use: Never used   Substance and Sexual Activity    Alcohol use: Never    Drug use: Never    Sexual activity: Not Currently     Social History     Social History Narrative    Pt is a 40 yr old female, single with 0 children living in [place], [place]. Pt works full time at [employer].       /60 (BP Location: Left arm, Patient Position: Sitting)   Pulse 73   Temp 98 °F (36.7 °C)   Ht 162.6 cm (64\")   Wt 91.6 kg (202 lb)   SpO2 98%   BMI 34.67 kg/m²     Physical Exam  Constitutional:       Appearance: She is obese.   HENT:      Head: Normocephalic and atraumatic.   Cardiovascular:      Rate and Rhythm: Normal rate and regular rhythm.   Pulmonary:      Effort: Pulmonary effort is normal.      Breath sounds: Normal breath sounds.   Abdominal:      General: Bowel sounds are normal. There is no distension.      Palpations: Abdomen is soft. There is no mass.      Tenderness: There is no abdominal tenderness.   Skin:     General: Skin is warm and dry.   Neurological:      General: No focal deficit present.      Mental Status: She is alert and oriented to person, place, and time.   Psychiatric:         Mood and Affect: Mood normal.         Behavior: Behavior normal.         Assessment:  1 year s/p LSG/HHR 9/8/22 GDW     ICD-10-CM ICD-9-CM   1. Fatigue, unspecified type  R53.83 780.79   2. Malnutrition screen  Z13.21 V77.2   3. Postgastrectomy malabsorption  K91.2 579.3    Z90.3    4. Hypovitaminosis D  E55.9 268.9   5. Abnormal blood level of iron  R79.0 790.6       BMI is >= 30 and <35. (Class 1 Obesity). The following options were offered after discussion;: exercise counseling/recommendations and nutrition counseling/recommendations         Plan:  Discussed getting back on track with good food choices " and healthy habits.  Continue protein >70g/day.  Continue routine physical activity.  Routine bariatric labs ordered.  Continue vitamins w/ adjustments pending lab results.  Call w/ problems/concerns. Miralax BID for constipation. Increase fiber and water intake.     The patient was instructed to follow up in 6 months, sooner if needed.

## 2023-09-18 LAB
25(OH)D3+25(OH)D2 SERPL-MCNC: 32.4 NG/ML (ref 30–100)
ALBUMIN SERPL-MCNC: 4.6 G/DL (ref 3.9–4.9)
ALBUMIN/GLOB SERPL: 1.8 {RATIO} (ref 1.2–2.2)
ALP SERPL-CCNC: 54 IU/L (ref 44–121)
ALT SERPL-CCNC: 13 IU/L (ref 0–32)
AST SERPL-CCNC: 14 IU/L (ref 0–40)
BASOPHILS # BLD AUTO: 0.1 X10E3/UL (ref 0–0.2)
BASOPHILS NFR BLD AUTO: 1 %
BILIRUB SERPL-MCNC: 0.6 MG/DL (ref 0–1.2)
BUN SERPL-MCNC: 21 MG/DL (ref 6–24)
BUN/CREAT SERPL: 28 (ref 9–23)
CALCIUM SERPL-MCNC: 8.8 MG/DL (ref 8.7–10.2)
CHLORIDE SERPL-SCNC: 97 MMOL/L (ref 96–106)
CO2 SERPL-SCNC: 26 MMOL/L (ref 20–29)
CREAT SERPL-MCNC: 0.75 MG/DL (ref 0.57–1)
EGFRCR SERPLBLD CKD-EPI 2021: 101 ML/MIN/1.73
EOSINOPHIL # BLD AUTO: 0.1 X10E3/UL (ref 0–0.4)
EOSINOPHIL NFR BLD AUTO: 1 %
ERYTHROCYTE [DISTWIDTH] IN BLOOD BY AUTOMATED COUNT: 13.2 % (ref 11.7–15.4)
FERRITIN SERPL-MCNC: 202 NG/ML (ref 15–150)
FOLATE SERPL-MCNC: 7.4 NG/ML
GLOBULIN SER CALC-MCNC: 2.6 G/DL (ref 1.5–4.5)
GLUCOSE SERPL-MCNC: 109 MG/DL (ref 70–99)
HCT VFR BLD AUTO: 42 % (ref 34–46.6)
HGB BLD-MCNC: 14.2 G/DL (ref 11.1–15.9)
IMM GRANULOCYTES # BLD AUTO: 0.1 X10E3/UL (ref 0–0.1)
IMM GRANULOCYTES NFR BLD AUTO: 1 %
IRON SERPL-MCNC: 58 UG/DL (ref 27–159)
LYMPHOCYTES # BLD AUTO: 3.7 X10E3/UL (ref 0.7–3.1)
LYMPHOCYTES NFR BLD AUTO: 37 %
MCH RBC QN AUTO: 28.3 PG (ref 26.6–33)
MCHC RBC AUTO-ENTMCNC: 33.8 G/DL (ref 31.5–35.7)
MCV RBC AUTO: 84 FL (ref 79–97)
METHYLMALONATE SERPL-SCNC: 113 NMOL/L (ref 0–378)
MONOCYTES # BLD AUTO: 0.8 X10E3/UL (ref 0.1–0.9)
MONOCYTES NFR BLD AUTO: 8 %
NEUTROPHILS # BLD AUTO: 5.3 X10E3/UL (ref 1.4–7)
NEUTROPHILS NFR BLD AUTO: 52 %
PLATELET # BLD AUTO: 281 X10E3/UL (ref 150–450)
POTASSIUM SERPL-SCNC: 4 MMOL/L (ref 3.5–5.2)
PREALB SERPL-MCNC: 30 MG/DL (ref 12–34)
PROT SERPL-MCNC: 7.2 G/DL (ref 6–8.5)
RBC # BLD AUTO: 5.01 X10E6/UL (ref 3.77–5.28)
SODIUM SERPL-SCNC: 138 MMOL/L (ref 134–144)
VIT B1 BLD-SCNC: 120.6 NMOL/L (ref 66.5–200)
WBC # BLD AUTO: 10 X10E3/UL (ref 3.4–10.8)

## 2023-09-26 ENCOUNTER — OFFICE VISIT (OUTPATIENT)
Dept: ENDOCRINOLOGY | Facility: CLINIC | Age: 43
End: 2023-09-26
Payer: COMMERCIAL

## 2023-09-26 VITALS
HEIGHT: 64 IN | OXYGEN SATURATION: 98 % | SYSTOLIC BLOOD PRESSURE: 118 MMHG | WEIGHT: 211 LBS | DIASTOLIC BLOOD PRESSURE: 72 MMHG | HEART RATE: 71 BPM | BODY MASS INDEX: 36.02 KG/M2

## 2023-09-26 DIAGNOSIS — I10 ESSENTIAL HYPERTENSION: ICD-10-CM

## 2023-09-26 DIAGNOSIS — E20.0 IDIOPATHIC HYPOPARATHYROIDISM: ICD-10-CM

## 2023-09-26 DIAGNOSIS — E78.2 MIXED HYPERLIPIDEMIA: ICD-10-CM

## 2023-09-26 DIAGNOSIS — E11.9 TYPE 2 DIABETES MELLITUS WITHOUT COMPLICATION, WITHOUT LONG-TERM CURRENT USE OF INSULIN: Primary | ICD-10-CM

## 2023-09-26 NOTE — PROGRESS NOTES
Chief Complaint   Patient presents with    Diabetes     Follow up      HPI:   Natty Fuentes is a 42 y.o.female who returns to Endocrine Clinic for f/u evaluation of multiple medical problems. Last visit 03/29/2023.  Her history is as follows:    Interim Events:   - Mild wt gain since last visit  - Having constipation on Rybelsus 7 mg, but denies N/V or increased acid reflux  - Had labs at PCP's office on 08/24/2023, and Bariatric surgery on 09/13/2023, see below    1) idiopathic hypoparathyroidism:  - diagnosed in 2011 after presenting with symptomatic hypocalcemia  - etiology presumed infiltrative vs autoimmune hypoparathyroidism  - Taking calcitriol 0.25 mcg BID, Caltrate + D 600 mg BID w/ food  - HCTZ 25 mg daily to reduce hypercalciuria   - last 24 hr urine calcium, Cr: (09/2020): Calcium 175 mg/24 hr, Cr 2.10 gm/24 hr (0.60 - 1.80), urine volume 2,750 mL    2) Type 2 DM with no associated complications at this time:  - diagnosed in 2013  - did not tolerate Ozempic, Victoza, Trulicity   - did not tolerate Januvia   - Started insulin in 01/2020 after hospitalization for pneumonia  - Tolerated Rybelsus 3 mg only, not higher doses  - Had recurrent vaginal candidiasis on Jardiance  - -Is s/p sleeve gastrectomy at  on 09/08/2022. Pt has lost a total 80 lbs. Was able to stop most DM medications    Current Medications:  Metformin 500 mg BID  Rybelsus 7 mg qAM: causing constipation, denies N/V    Glucometer / BG Log: No readings for review today    DM Health Maintenance:  Ophtho: Last visit - 2017, no retinopathy per pt  Podiatry: Last visit - no recent visit  Monofilament / Foot exam: DUE  Lipids: see below  Urine microalbumin/Cr ratio: (08/2023) 30.5  TSH: (01/2023) normal at 1.420  CBC: (09/2023) WNL, Hgb 14.2  CMP: (09/2023): Cr 0.75, GFR 1-1, LFTs WNL, Ca 8.8, Alb 4.6      3) Essential HTN:  - pt taking lisinopril 20 mg daily, HCTZ 25 mg daily  - past evaluation (2016) for secondary causes of HTN showed no  "endocrine pathology (aldosterone/renin -normal, 1 mg dex suppression test -normal)  - States that carvediolol caused pulse to be too low, lightheadedness. Tolerated metoprolol XL     4) mixed hyperlipidemia:   Lipid Panel (08/2023): Tchol 199,  (off fenofibrate), HDL 33, LDL 98  - taking pravastatin 40 mg    5) nontoxic nodular goiter:   (10/04/2011): Pt had a thyroid US and US-Guided FNA of a right lobe/isthmus nodule by  Endocrinology. The US report described a \" nodule measuring 1.74 x 1.06 x 1.31 cm in size is complex, hypoechoic, with indistinct borders. A few small calcifications without coma tail. It has a complex cystic-solid consistency and a grade II vascularity.\"    (10/04/2011): Cytology Report: \"DIAGNOSIS ULTRASOUND GUIDED FNA, RIGHT THYROID NODULE:  ABUNDANT COLLOID, RARE MACROPHAGES, AND SCANT FOLLICULAR GROUPS\"    - Repeat thyroid US was completed in clinic 03/2019:  The thyroid gland was mildly enlarged in size by measured dimensions. Finding are consistent with nodular goiter. No discrete nodules were identified in the left lobe. At the junction of the right lobe and isthmus, a 0.99(L) x 0.68(AP) x 0.80(T) cm hypoechoic mixed cystic and solid nodule was identified. The nodule's margin was not well defined. The nodule had minimal peripheral vascularity and no microcalcifications. Course linear calcifications were present. Although imaging was not available for comparison, the nodule appeared to be significantly smaller compared to the outside US report description from 10/2011 and had similar imaging characteristics. Two slightly enlarged left, level III cervical lymph nodes were identified with US characteristics suggestive of benign lymph nodes as described above. No pathologic lymph nodes were seen. Specifically, no superficial mass or enlarged lymph nodes were noted in the left supraclavicular area where patient describes pain and swelling.   RECOMMENDATIONS: Repeat Thyroid US " "recommended if changes in the gland/neck are noted on physical exam    Other medical history:   - gout,   - SANYA on CPAP,   - GERD     Review of Systems   Constitutional: Negative.         Mild wt gain since last visit   HENT: Negative.  Negative for sore throat and trouble swallowing.    Eyes: Negative.    Respiratory: Negative.     Cardiovascular: Negative.    Gastrointestinal: Negative.  Positive for constipation.   Endocrine: Negative.    Genitourinary: Negative.    Musculoskeletal:  Positive for arthralgias.   Skin: Negative.    Allergic/Immunologic: Negative.    Neurological: Negative.    Hematological: Negative.    Psychiatric/Behavioral: Negative.         The following portions of the patient's history were reviewed and updated as appropriate: allergies, current medications, past family history, past medical history, past social history, past surgical history and problem list.      /72   Pulse 71   Ht 162.6 cm (64\")   Wt 95.7 kg (211 lb)   SpO2 98%   BMI 36.22 kg/m²   Physical Exam   Constitutional: She is oriented to person, place, and time. She appears well-developed. No distress.   HENT:   Head: Normocephalic.   Eyes: Pupils are equal, round, and reactive to light. Conjunctivae are normal.   Neck: No tracheal deviation present. No thyromegaly present.   No palpable thyroid nodules. No tenderness to palpation     Cardiovascular: Normal rate, regular rhythm and normal heart sounds.   No murmur heard.  Pulmonary/Chest: Effort normal and breath sounds normal. No respiratory distress.   Abdominal: Soft. Normal appearance. There is no abdominal tenderness.   Lymphadenopathy:     She has no cervical adenopathy.   Neurological: She is alert and oriented to person, place, and time. No cranial nerve deficit.   Skin: Skin is warm and dry. She is not diaphoretic. No erythema.   No acanthosis nigricans   Psychiatric: Her behavior is normal.   Vitals reviewed.     LABS/IMAGING:  Outside labs reviewed and " summarized in HPI  No visits with results within 1 Day(s) from this visit.   Latest known visit with results is:   Office Visit on 09/13/2023   Component Date Value Ref Range Status    Glucose 09/13/2023 109 (H)  70 - 99 mg/dL Final    BUN 09/13/2023 21  6 - 24 mg/dL Final    Creatinine 09/13/2023 0.75  0.57 - 1.00 mg/dL Final    EGFR Result 09/13/2023 101  >59 mL/min/1.73 Final    BUN/Creatinine Ratio 09/13/2023 28 (H)  9 - 23 Final    Sodium 09/13/2023 138  134 - 144 mmol/L Final    Potassium 09/13/2023 4.0  3.5 - 5.2 mmol/L Final    Chloride 09/13/2023 97  96 - 106 mmol/L Final    Total CO2 09/13/2023 26  20 - 29 mmol/L Final    Calcium 09/13/2023 8.8  8.7 - 10.2 mg/dL Final    Total Protein 09/13/2023 7.2  6.0 - 8.5 g/dL Final    Albumin 09/13/2023 4.6  3.9 - 4.9 g/dL Final    Globulin 09/13/2023 2.6  1.5 - 4.5 g/dL Final    A/G Ratio 09/13/2023 1.8  1.2 - 2.2 Final    Total Bilirubin 09/13/2023 0.6  0.0 - 1.2 mg/dL Final    Alkaline Phosphatase 09/13/2023 54  44 - 121 IU/L Final    AST (SGOT) 09/13/2023 14  0 - 40 IU/L Final    ALT (SGPT) 09/13/2023 13  0 - 32 IU/L Final    Ferritin 09/13/2023 202 (H)  15 - 150 ng/mL Final    WBC 09/13/2023 10.0  3.4 - 10.8 x10E3/uL Final    RBC 09/13/2023 5.01  3.77 - 5.28 x10E6/uL Final    Hemoglobin 09/13/2023 14.2  11.1 - 15.9 g/dL Final    Hematocrit 09/13/2023 42.0  34.0 - 46.6 % Final    MCV 09/13/2023 84  79 - 97 fL Final    MCH 09/13/2023 28.3  26.6 - 33.0 pg Final    MCHC 09/13/2023 33.8  31.5 - 35.7 g/dL Final    RDW 09/13/2023 13.2  11.7 - 15.4 % Final    Platelets 09/13/2023 281  150 - 450 x10E3/uL Final    Neutrophil Rel % 09/13/2023 52  Not Estab. % Final    Lymphocyte Rel % 09/13/2023 37  Not Estab. % Final    Monocyte Rel % 09/13/2023 8  Not Estab. % Final    Eosinophil Rel % 09/13/2023 1  Not Estab. % Final    Basophil Rel % 09/13/2023 1  Not Estab. % Final    Neutrophils Absolute 09/13/2023 5.3  1.4 - 7.0 x10E3/uL Final    Lymphocytes Absolute 09/13/2023 3.7  (H)  0.7 - 3.1 x10E3/uL Final    Monocytes Absolute 09/13/2023 0.8  0.1 - 0.9 x10E3/uL Final    Eosinophils Absolute 09/13/2023 0.1  0.0 - 0.4 x10E3/uL Final    Basophils Absolute 09/13/2023 0.1  0.0 - 0.2 x10E3/uL Final    Immature Granulocyte Rel % 09/13/2023 1  Not Estab. % Final    Immature Grans Absolute 09/13/2023 0.1  0.0 - 0.1 x10E3/uL Final    Folate 09/13/2023 7.4  >3.0 ng/mL Final    Comment: A serum folate concentration of less than 3.1 ng/mL is  considered to represent clinical deficiency.      Iron 09/13/2023 58  27 - 159 ug/dL Final    Methylmalonic Acid 09/13/2023 113  0 - 378 nmol/L Final    Prealbumin 09/13/2023 30  12 - 34 mg/dL Final    25 Hydroxy, Vitamin D 09/13/2023 32.4  30.0 - 100.0 ng/mL Final    Comment: Vitamin D deficiency has been defined by the [place] of  Medicine and an Endocrine Society practice guideline as a  level of serum 25-OH vitamin D less than 20 ng/mL (1,2).  The Endocrine Society went on to further define vitamin D  insufficiency as a level between 21 and 29 ng/mL (2).  1. IOM ([place] of Medicine). 2010. Dietary reference     intakes for calcium and D. [place]: The     National Academies Press.  2. Aissatou MF, Steve NC, Meeta LUI, et al.     Evaluation, treatment, and prevention of vitamin D     deficiency: an Endocrine Society clinical practice     guideline. JCEM. 2011 Jul; 96(7):1911-30.      Vitamin B1, Whole Blood 09/13/2023 120.6  66.5 - 200.0 nmol/L Final     Lab Results   Component Value Date    CHOL 199 08/24/2023    CHLPL 175 01/11/2019    TRIG 403 (H) 08/24/2023    HDL 33 (L) 08/24/2023    LDL 98 08/24/2023     Lab Results   Component Value Date    MICROALBUR 3.6 08/24/2023     Lab Results   Component Value Date    HGBA1C 6.60 (H) 08/24/2023         ASSESSMENT/PLAN:  1) Type 2 DM w/o complication at this time: controlled, A1C% 6.60 in 08/2023. Was 5.40% last visit    - did not tolerate Ozempic, Victoza, Trulicity, or Januvia in the past   -  Pt having constipation on Rybelsus 7 mg. Will have pt try sample of Mounjaro 2.5 mg weekly. If tolerated, will send Rx for 5 mg weekly.   - continue metformin to 500 mg BID     2) idiopathic hypoparathyroidism: controlled  - CMP checked 09/2023, Calcium at goal  - d/w pt that goal calcium is at the lower end of the range to minimize calcium-phosphorus product deposition  - continue calcitriol 0.25 mcg BID, Caltrate + D 600 mg BID w/ food  - HCTZ 25 mg daily for HTN and to reduce hypercalciuria      3) mixed hyperlipidemia: fair control  Lipid Panel (08/2023): Tchol 199,  (off fenofibrate), HDL 33, LDL 98  - continue pravastatin 40 mg,   - Will see if Mounjaro leads to decreased TG level. May add back fenofibrate if TG still elevated > 400     4) essential HTN: controlled  - continue Lisinopril 20 mg daily  - HCTZ 25 mg daily    RTC 6 months    Signed: Therese Cintron MD

## 2023-09-27 ENCOUNTER — PATIENT MESSAGE (OUTPATIENT)
Dept: ENDOCRINOLOGY | Facility: CLINIC | Age: 43
End: 2023-09-27
Payer: COMMERCIAL

## 2023-10-16 DIAGNOSIS — E11.9 TYPE 2 DIABETES MELLITUS WITHOUT COMPLICATION, WITHOUT LONG-TERM CURRENT USE OF INSULIN: Primary | ICD-10-CM

## 2023-10-16 RX ORDER — TIRZEPATIDE 5 MG/.5ML
5 INJECTION, SOLUTION SUBCUTANEOUS WEEKLY
Qty: 2 ML | Refills: 5 | Status: SHIPPED | OUTPATIENT
Start: 2023-10-16 | End: 2023-10-20 | Stop reason: SDUPTHER

## 2023-10-20 DIAGNOSIS — E11.9 TYPE 2 DIABETES MELLITUS WITHOUT COMPLICATION, WITHOUT LONG-TERM CURRENT USE OF INSULIN: ICD-10-CM

## 2023-10-20 RX ORDER — TIRZEPATIDE 5 MG/.5ML
5 INJECTION, SOLUTION SUBCUTANEOUS WEEKLY
Qty: 2 ML | Refills: 5 | Status: SHIPPED | OUTPATIENT
Start: 2023-10-20

## 2024-02-09 DIAGNOSIS — E55.9 VITAMIN D DEFICIENCY: Primary | ICD-10-CM

## 2024-02-09 RX ORDER — ERGOCALCIFEROL 1.25 MG/1
50000 CAPSULE ORAL
Qty: 4 CAPSULE | Refills: 5 | Status: SHIPPED | OUTPATIENT
Start: 2024-02-09

## 2024-04-09 ENCOUNTER — OFFICE VISIT (OUTPATIENT)
Dept: ENDOCRINOLOGY | Facility: CLINIC | Age: 44
End: 2024-04-09
Payer: COMMERCIAL

## 2024-04-09 VITALS
HEART RATE: 88 BPM | BODY MASS INDEX: 36.54 KG/M2 | WEIGHT: 214 LBS | SYSTOLIC BLOOD PRESSURE: 120 MMHG | HEIGHT: 64 IN | OXYGEN SATURATION: 99 % | DIASTOLIC BLOOD PRESSURE: 70 MMHG

## 2024-04-09 DIAGNOSIS — I10 ESSENTIAL HYPERTENSION: ICD-10-CM

## 2024-04-09 DIAGNOSIS — E78.2 MIXED HYPERLIPIDEMIA: ICD-10-CM

## 2024-04-09 DIAGNOSIS — E55.9 VITAMIN D DEFICIENCY: ICD-10-CM

## 2024-04-09 DIAGNOSIS — E20.0 IDIOPATHIC HYPOPARATHYROIDISM: ICD-10-CM

## 2024-04-09 DIAGNOSIS — E11.65 TYPE 2 DIABETES MELLITUS WITH HYPERGLYCEMIA, WITHOUT LONG-TERM CURRENT USE OF INSULIN: Primary | ICD-10-CM

## 2024-04-09 LAB
ALBUMIN SERPL-MCNC: 4.3 G/DL (ref 3.5–5.2)
ALBUMIN/GLOB SERPL: 1.7 G/DL
ALP SERPL-CCNC: 57 U/L (ref 39–117)
ALT SERPL W P-5'-P-CCNC: 22 U/L (ref 1–33)
ANION GAP SERPL CALCULATED.3IONS-SCNC: 11 MMOL/L (ref 5–15)
ARTICHOKE IGE QN: 64 MG/DL (ref 0–100)
AST SERPL-CCNC: 17 U/L (ref 1–32)
BILIRUB SERPL-MCNC: 0.5 MG/DL (ref 0–1.2)
BUN SERPL-MCNC: 17 MG/DL (ref 6–20)
BUN/CREAT SERPL: 16.3 (ref 7–25)
CALCIUM SPEC-SCNC: 8.6 MG/DL (ref 8.6–10.5)
CHLORIDE SERPL-SCNC: 98 MMOL/L (ref 98–107)
CHOLEST SERPL-MCNC: 169 MG/DL (ref 0–200)
CO2 SERPL-SCNC: 27 MMOL/L (ref 22–29)
CREAT SERPL-MCNC: 1.04 MG/DL (ref 0.57–1)
DEPRECATED RDW RBC AUTO: 40.2 FL (ref 37–54)
EGFRCR SERPLBLD CKD-EPI 2021: 68.1 ML/MIN/1.73
ERYTHROCYTE [DISTWIDTH] IN BLOOD BY AUTOMATED COUNT: 14.1 % (ref 12.3–15.4)
EXPIRATION DATE: ABNORMAL
EXPIRATION DATE: ABNORMAL
GLOBULIN UR ELPH-MCNC: 2.5 GM/DL
GLUCOSE BLDC GLUCOMTR-MCNC: 195 MG/DL (ref 70–130)
GLUCOSE SERPL-MCNC: 164 MG/DL (ref 65–99)
HBA1C MFR BLD: 8.4 % (ref 4.5–5.7)
HCT VFR BLD AUTO: 33.4 % (ref 34–46.6)
HDLC SERPL-MCNC: 28 MG/DL (ref 40–60)
HGB BLD-MCNC: 11.1 G/DL (ref 12–15.9)
LDLC SERPL CALC-MCNC: ABNORMAL MG/DL
LDLC/HDLC SERPL: ABNORMAL {RATIO}
Lab: ABNORMAL
Lab: ABNORMAL
MCH RBC QN AUTO: 26.9 PG (ref 26.6–33)
MCHC RBC AUTO-ENTMCNC: 33.2 G/DL (ref 31.5–35.7)
MCV RBC AUTO: 80.9 FL (ref 79–97)
PLATELET # BLD AUTO: 272 10*3/MM3 (ref 140–450)
PMV BLD AUTO: 11.4 FL (ref 6–12)
POTASSIUM SERPL-SCNC: 3.8 MMOL/L (ref 3.5–5.2)
PROT SERPL-MCNC: 6.8 G/DL (ref 6–8.5)
RBC # BLD AUTO: 4.13 10*6/MM3 (ref 3.77–5.28)
SODIUM SERPL-SCNC: 136 MMOL/L (ref 136–145)
TRIGL SERPL-MCNC: 827 MG/DL (ref 0–150)
TSH SERPL DL<=0.05 MIU/L-ACNC: 1.26 UIU/ML (ref 0.27–4.2)
VLDLC SERPL-MCNC: ABNORMAL MG/DL
WBC NRBC COR # BLD AUTO: 7.57 10*3/MM3 (ref 3.4–10.8)

## 2024-04-09 PROCEDURE — 83721 ASSAY OF BLOOD LIPOPROTEIN: CPT | Performed by: INTERNAL MEDICINE

## 2024-04-09 PROCEDURE — 82947 ASSAY GLUCOSE BLOOD QUANT: CPT | Performed by: INTERNAL MEDICINE

## 2024-04-09 PROCEDURE — 80050 GENERAL HEALTH PANEL: CPT | Performed by: INTERNAL MEDICINE

## 2024-04-09 PROCEDURE — 80061 LIPID PANEL: CPT | Performed by: INTERNAL MEDICINE

## 2024-04-09 PROCEDURE — 83036 HEMOGLOBIN GLYCOSYLATED A1C: CPT | Performed by: INTERNAL MEDICINE

## 2024-04-09 PROCEDURE — 99214 OFFICE O/P EST MOD 30 MIN: CPT | Performed by: INTERNAL MEDICINE

## 2024-04-09 PROCEDURE — 82306 VITAMIN D 25 HYDROXY: CPT | Performed by: INTERNAL MEDICINE

## 2024-04-09 PROCEDURE — 82607 VITAMIN B-12: CPT | Performed by: INTERNAL MEDICINE

## 2024-04-09 RX ORDER — CALCITRIOL 0.5 UG/1
0.5 CAPSULE, LIQUID FILLED ORAL DAILY
Qty: 90 CAPSULE | Refills: 3 | Status: SHIPPED | OUTPATIENT
Start: 2024-04-09

## 2024-04-09 NOTE — PROGRESS NOTES
Chief Complaint   Patient presents with    Diabetes     HPI:   Natty Fuentes is a 43 y.o.female who returns to Endocrine Clinic for f/u evaluation of multiple medical problems. Last visit 09/26/2023.  Her history is as follows:    Interim Events:   - Mild wt gain since last visit  - Had left shoulder surgery on 02/09/2024. A1C% prior to surgery was 7.0%. Pt received three glucocorticoid injections perioperatively which caused significant hyperglycemia  - Pt donated blood this past Friday    1) idiopathic hypoparathyroidism:  - diagnosed in 2011 after presenting with symptomatic hypocalcemia  - etiology presumed infiltrative vs autoimmune hypoparathyroidism  - Taking calcitriol 0.5 mcg daily, Caltrate + D 600 mg BID w/ food  - HCTZ 25 mg daily to reduce hypercalciuria   - last 24 hr urine calcium, Cr: (09/2020): Calcium 175 mg/24 hr, Cr 2.10 gm/24 hr (0.60 - 1.80), urine volume 2,750 mL    2) Type 2 DM with no associated complications at this time:  - diagnosed in 2013  - did not tolerate Victoza, Trulicity in the past.   - did not tolerate Januvia   - Started insulin in 01/2020 after hospitalization for pneumonia  - Tolerated Rybelsus 3 mg only, not higher doses  - Had recurrent vaginal candidiasis on Jardiance  - -Is s/p sleeve gastrectomy at  on 09/08/2022. Pt has lost a total 70-80 lbs. Was able to stop most DM medications  - 2023: Pt did not tolerate Mounjaro 5 mg weekly due to constipation    Current Medications:  Metformin 500 mg BID    Glucometer / BG Log: No readings for review today    DM Health Maintenance:  Ophtho: Last visit - 2017, no retinopathy per pt  Podiatry: Last visit - no recent visit  Monofilament / Foot exam: DUE  Lipids: see below  Urine microalbumin/Cr ratio: (08/2023) 30.5  TSH: (04/2024) normal at 1.260  CBC: (04/2024) Hgb 11.1 - recently donated blood  CMP: (04/2024): Cr 1.04, GFR 68.1, LFTs WNL, Ca 8.6, Alb 4.3    3) Essential HTN:  - pt taking lisinopril 20 mg daily, HCTZ 25 mg  "daily  - past evaluation (2016) for secondary causes of HTN showed no endocrine pathology (aldosterone/renin -normal, 1 mg dex suppression test -normal)    4) mixed hyperlipidemia:   Lipid Panel (04/2024): Tchol 169,  (on fenofibrate), HDL 33, LDL 64  - taking pravastatin 40 mg, fenofibrate 145 mg daily    5) nontoxic nodular goiter:   (10/04/2011): Pt had a thyroid US and US-Guided FNA of a right lobe/isthmus nodule by  Endocrinology. The US report described a \" nodule measuring 1.74 x 1.06 x 1.31 cm in size is complex, hypoechoic, with indistinct borders. A few small calcifications without coma tail. It has a complex cystic-solid consistency and a grade II vascularity.\"    (10/04/2011): Cytology Report: \"DIAGNOSIS ULTRASOUND GUIDED FNA, RIGHT THYROID NODULE:  ABUNDANT COLLOID, RARE MACROPHAGES, AND SCANT FOLLICULAR GROUPS\"    - Repeat thyroid US was completed in clinic 03/2019:  The thyroid gland was mildly enlarged in size by measured dimensions. Finding are consistent with nodular goiter. No discrete nodules were identified in the left lobe. At the junction of the right lobe and isthmus, a 0.99(L) x 0.68(AP) x 0.80(T) cm hypoechoic mixed cystic and solid nodule was identified. The nodule's margin was not well defined. The nodule had minimal peripheral vascularity and no microcalcifications. Course linear calcifications were present. Although imaging was not available for comparison, the nodule appeared to be significantly smaller compared to the outside US report description from 10/2011 and had similar imaging characteristics. Two slightly enlarged left, level III cervical lymph nodes were identified with US characteristics suggestive of benign lymph nodes as described above. No pathologic lymph nodes were seen. Specifically, no superficial mass or enlarged lymph nodes were noted in the left supraclavicular area where patient describes pain and swelling.   RECOMMENDATIONS: Repeat Thyroid US recommended " "if changes in the gland/neck are noted on physical exam    Other medical history:   - gout: on allopurinol  - SANYA on CPAP,   - h/o GERD: currently does not require medication    Review of Systems   Constitutional: Negative.         Mild wt gain since last visit   HENT: Negative.  Negative for sore throat and trouble swallowing.    Eyes: Negative.    Respiratory: Negative.     Cardiovascular: Negative.    Gastrointestinal:  Positive for constipation (intermittent).   Endocrine: Negative.    Genitourinary: Negative.    Musculoskeletal:  Positive for arthralgias.   Skin: Negative.    Allergic/Immunologic: Negative.    Neurological: Negative.    Hematological: Negative.    Psychiatric/Behavioral: Negative.         The following portions of the patient's history were reviewed and updated as appropriate: allergies, current medications, past family history, past medical history, past social history, past surgical history and problem list.      /70 (BP Location: Left arm, Patient Position: Sitting, Cuff Size: Adult)   Pulse 88   Ht 162.6 cm (64\")   Wt 97.1 kg (214 lb)   SpO2 99%   BMI 36.73 kg/m²   Physical Exam   Constitutional: She is oriented to person, place, and time. She appears well-developed. No distress.   HENT:   Head: Normocephalic.   Eyes: Pupils are equal, round, and reactive to light. Conjunctivae are normal.   Neck: No tracheal deviation present. No thyromegaly present.   No palpable thyroid nodules. No tenderness to palpation     Cardiovascular: Normal rate, regular rhythm and normal heart sounds.   No murmur heard.  Pulmonary/Chest: Effort normal and breath sounds normal. No respiratory distress.   Abdominal: Soft. Normal appearance. There is no abdominal tenderness.   Lymphadenopathy:     She has no cervical adenopathy.   Neurological: She is alert and oriented to person, place, and time. No cranial nerve deficit.   Skin: Skin is warm and dry. She is not diaphoretic. No erythema.   No acanthosis " nigricans   Psychiatric: Her behavior is normal.   Vitals reviewed.     LABS/IMAGING:  Outside labs reviewed and summarized in HPI  Office Visit on 04/09/2024   Component Date Value Ref Range Status    Glucose 04/09/2024 195 (A)  70 - 130 mg/dL Final    Lot Number 04/09/2024 [id number]   Final    Expiration Date 04/09/2024 10-20-24   Final    Hemoglobin A1C 04/09/2024 8.4 (A)  4.5 - 5.7 % Final    Lot Number 04/09/2024 [id number]   Final    Expiration Date 04/09/2024 12-17-25   Final    WBC 04/09/2024 7.57  3.40 - 10.80 10*3/mm3 Final    RBC 04/09/2024 4.13  3.77 - 5.28 10*6/mm3 Final    Hemoglobin 04/09/2024 11.1 (L)  12.0 - 15.9 g/dL Final    Hematocrit 04/09/2024 33.4 (L)  34.0 - 46.6 % Final    MCV 04/09/2024 80.9  79.0 - 97.0 fL Final    MCH 04/09/2024 26.9  26.6 - 33.0 pg Final    MCHC 04/09/2024 33.2  31.5 - 35.7 g/dL Final    RDW 04/09/2024 14.1  12.3 - 15.4 % Final    RDW-SD 04/09/2024 40.2  37.0 - 54.0 fl Final    MPV 04/09/2024 11.4  6.0 - 12.0 fL Final    Platelets 04/09/2024 272  140 - 450 10*3/mm3 Final    Glucose 04/09/2024 164 (H)  65 - 99 mg/dL Final    BUN 04/09/2024 17  6 - 20 mg/dL Final    Creatinine 04/09/2024 1.04 (H)  0.57 - 1.00 mg/dL Final    Sodium 04/09/2024 136  136 - 145 mmol/L Final    Potassium 04/09/2024 3.8  3.5 - 5.2 mmol/L Final    Chloride 04/09/2024 98  98 - 107 mmol/L Final    CO2 04/09/2024 27.0  22.0 - 29.0 mmol/L Final    Calcium 04/09/2024 8.6  8.6 - 10.5 mg/dL Final    Total Protein 04/09/2024 6.8  6.0 - 8.5 g/dL Final    Albumin 04/09/2024 4.3  3.5 - 5.2 g/dL Final    ALT (SGPT) 04/09/2024 22  1 - 33 U/L Final    AST (SGOT) 04/09/2024 17  1 - 32 U/L Final    Alkaline Phosphatase 04/09/2024 57  39 - 117 U/L Final    Total Bilirubin 04/09/2024 0.5  0.0 - 1.2 mg/dL Final    Globulin 04/09/2024 2.5  gm/dL Final    A/G Ratio 04/09/2024 1.7  g/dL Final    BUN/Creatinine Ratio 04/09/2024 16.3  7.0 - 25.0 Final    Anion Gap 04/09/2024 11.0  5.0 - 15.0 mmol/L Final    eGFR  04/09/2024 68.1  >60.0 mL/min/1.73 Final    Total Cholesterol 04/09/2024 169  0 - 200 mg/dL Final    Triglycerides 04/09/2024 827 (H)  0 - 150 mg/dL Final    HDL Cholesterol 04/09/2024 28 (L)  40 - 60 mg/dL Final    LDL Cholesterol  04/09/2024    Final    Unable to calculate    VLDL Cholesterol 04/09/2024    Final    Unable to calculate    LDL/HDL Ratio 04/09/2024    Final    Unable to calculate    TSH 04/09/2024 1.260  0.270 - 4.200 uIU/mL Final    Vitamin B-12 04/09/2024 1,177 (H)  211 - 946 pg/mL Final    25 Hydroxy, Vitamin D 04/09/2024 40.9  30.0 - 100.0 ng/ml Final    LDL Cholesterol  04/09/2024 64  0 - 100 mg/dL Final         ASSESSMENT/PLAN:  1) Type 2 DM w/o complication at this time: uncontrolled with hyperglycemia, A1C% was 7.0 in 02/2024, is 8.40% today.   Pt received three glucocorticoid injections perioperatively which caused significant hyperglycemia and contributed to pt's increase in A1C%    - did not tolerate Victoza, Trulicity, or Januvia in the past   - Pt having constipation on Rybelsus 7 mg and Mounjaro 2.5 mg and 5 mg    - increase metformin to 1000 mg BID   - sample of Ozempic 0.25 mg weekly given to patient to try. Reviewed potential GI side effects and advised pt to take stool softener regularly    2) idiopathic hypoparathyroidism: controlled  - CMP checked today, Calcium at goal  - d/w pt that goal calcium is at the lower end of the range to minimize calcium-phosphorus product deposition  - continue calcitriol 0.5 mcg daily, Caltrate + D 600 mg BID w/ food  - HCTZ 25 mg daily for HTN and to reduce hypercalciuria      3) mixed hyperlipidemia: uncontrolled  Lipid Panel (04/2024): Tchol 169,  (on fenofibrate), HDL 33, LDL 64    - Will d/c pravastatin 40 mg and start atorvastatin 40 mg nightly to help lower triglycerides  - Continue fenofibrate 145 mg daily     4) essential HTN: controlled  - continue Lisinopril 20 mg daily  - HCTZ 25 mg daily    5) vitamin D deficiency: controlled  -  vitamin D 25 -OH today 40.9  - continue ergocalciferol 50,000 IU weekly    RTC 6 months    Electronically Signed: Therese Cintron MD

## 2024-04-10 LAB
25(OH)D3 SERPL-MCNC: 40.9 NG/ML (ref 30–100)
VIT B12 BLD-MCNC: 1177 PG/ML (ref 211–946)

## 2024-04-13 RX ORDER — ATORVASTATIN CALCIUM 40 MG/1
40 TABLET, FILM COATED ORAL NIGHTLY
Qty: 90 TABLET | Refills: 3 | Status: SHIPPED | OUTPATIENT
Start: 2024-04-13

## 2024-04-13 RX ORDER — SEMAGLUTIDE 0.68 MG/ML
0.25 INJECTION, SOLUTION SUBCUTANEOUS WEEKLY
Start: 2024-04-13

## 2024-04-13 RX ORDER — FENOFIBRATE 145 MG/1
145 TABLET, COATED ORAL DAILY
COMMUNITY

## 2024-09-12 ENCOUNTER — HOSPITAL ENCOUNTER (OUTPATIENT)
Dept: WOUND CARE | Facility: HOSPITAL | Age: 44
Discharge: HOME | End: 2024-09-12
Payer: COMMERCIAL

## 2024-09-12 DIAGNOSIS — T14.8XXA OPEN WOUND: Primary | ICD-10-CM

## 2024-09-12 PROCEDURE — 97602 WOUND(S) CARE NON-SELECTIVE: CPT

## 2024-09-12 RX ORDER — SODIUM HYPOCHLORITE 2.5 MG/ML
1 SOLUTION TOPICAL AS NEEDED
Status: CANCELLED | OUTPATIENT
Start: 2024-09-12

## 2024-09-12 RX ORDER — DIAPER,BRIEF,INFANT-TODD,DISP
1 EACH MISCELLANEOUS ONCE
OUTPATIENT
Start: 2024-09-12 | End: 2024-09-12

## 2024-09-12 RX ORDER — SILVER SULFADIAZINE 10 MG/G
1 CREAM TOPICAL AS NEEDED
OUTPATIENT
Start: 2024-09-12

## 2024-09-12 RX ORDER — LIDOCAINE HYDROCHLORIDE AND EPINEPHRINE BITARTRATE 20; .01 MG/ML; MG/ML
10 INJECTION, SOLUTION SUBCUTANEOUS ONCE
Status: CANCELLED | OUTPATIENT
Start: 2024-09-12 | End: 2024-09-12

## 2024-09-12 RX ORDER — LIDOCAINE HYDROCHLORIDE 20 MG/ML
JELLY TOPICAL AS NEEDED
Status: CANCELLED | OUTPATIENT
Start: 2024-09-12

## 2024-09-12 RX ORDER — LIDOCAINE HYDROCHLORIDE 20 MG/ML
10 INJECTION, SOLUTION INFILTRATION; PERINEURAL ONCE
Status: CANCELLED | OUTPATIENT
Start: 2024-09-12 | End: 2024-09-12

## 2024-09-12 RX ORDER — LIDOCAINE HYDROCHLORIDE 20 MG/ML
6 JELLY TOPICAL ONCE
OUTPATIENT
Start: 2024-09-12 | End: 2024-09-12

## 2024-09-12 RX ORDER — MUPIROCIN 20 MG/G
1 OINTMENT TOPICAL AS NEEDED
Status: CANCELLED | OUTPATIENT
Start: 2024-09-12

## 2024-09-12 RX ORDER — LIDOCAINE HYDROCHLORIDE 20 MG/ML
10 INJECTION, SOLUTION INFILTRATION; PERINEURAL ONCE
OUTPATIENT
Start: 2024-09-12 | End: 2024-09-12

## 2024-09-12 RX ORDER — NYSTATIN 100000 [USP'U]/G
1 POWDER TOPICAL ONCE
OUTPATIENT
Start: 2024-09-12 | End: 2024-09-12

## 2024-09-12 RX ORDER — MUPIROCIN 20 MG/G
1 OINTMENT TOPICAL AS NEEDED
OUTPATIENT
Start: 2024-09-12

## 2024-09-12 RX ORDER — SILVER SULFADIAZINE 10 MG/G
1 CREAM TOPICAL AS NEEDED
Status: CANCELLED | OUTPATIENT
Start: 2024-09-12

## 2024-09-12 RX ORDER — CASTOR OIL AND BALSAM, PERU 788; 87 MG/G; MG/G
1 OINTMENT TOPICAL AS NEEDED
Status: CANCELLED | OUTPATIENT
Start: 2024-09-12

## 2024-09-12 RX ORDER — LIDOCAINE HYDROCHLORIDE 20 MG/ML
6 JELLY TOPICAL ONCE
Status: COMPLETED | OUTPATIENT
Start: 2024-09-12 | End: 2024-09-12

## 2024-09-12 RX ORDER — SODIUM HYPOCHLORITE 2.5 MG/ML
1 SOLUTION TOPICAL AS NEEDED
OUTPATIENT
Start: 2024-09-12

## 2024-09-12 RX ORDER — SODIUM HYPOCHLORITE 1.25 MG/ML
1 SOLUTION TOPICAL AS NEEDED
OUTPATIENT
Start: 2024-09-12

## 2024-09-12 RX ORDER — SODIUM HYPOCHLORITE 1.25 MG/ML
1 SOLUTION TOPICAL AS NEEDED
Status: CANCELLED | OUTPATIENT
Start: 2024-09-12

## 2024-09-12 RX ORDER — CASTOR OIL AND BALSAM, PERU 788; 87 MG/G; MG/G
1 OINTMENT TOPICAL AS NEEDED
OUTPATIENT
Start: 2024-09-12

## 2024-09-12 RX ORDER — NYSTATIN 100000 [USP'U]/G
1 POWDER TOPICAL ONCE
Status: CANCELLED | OUTPATIENT
Start: 2024-09-12 | End: 2024-09-12

## 2024-09-12 RX ORDER — LIDOCAINE HYDROCHLORIDE 20 MG/ML
JELLY TOPICAL AS NEEDED
OUTPATIENT
Start: 2024-09-12

## 2024-09-12 RX ORDER — DIAPER,BRIEF,INFANT-TODD,DISP
1 EACH MISCELLANEOUS ONCE
Status: CANCELLED | OUTPATIENT
Start: 2024-09-12 | End: 2024-09-12

## 2024-09-12 RX ORDER — LIDOCAINE HYDROCHLORIDE AND EPINEPHRINE BITARTRATE 20; .01 MG/ML; MG/ML
10 INJECTION, SOLUTION SUBCUTANEOUS ONCE
OUTPATIENT
Start: 2024-09-12 | End: 2024-09-12

## 2024-09-12 RX ADMIN — LIDOCAINE HYDROCHLORIDE 6 ML: 20 JELLY TOPICAL at 14:00

## 2024-09-12 NOTE — PROGRESS NOTES
Wound Clinic Note  Patient Identification:  Name:  Natty Fuentes  Age:  43 y.o.  Sex:  female  :  1980  MRN:  [id number]   Visit Number:  [id number]  Primary Care Physician:  Nidia Naranjo MD     Subjective     Chief complaint:     Right foot wound    History of presenting illness:     Patient is a 43 y.o. female with past medical history significant for diabetes and obesity.  that presented today for evaluation of right foot wound. Reports was injury while pressure washing 2 weeks ago. She went to ED reports no treatment in ED. No antibiotics. Denies vascular workup. Denies any fever or chills. Denies smoking. Does not wear compression wraps or socks.   ---------------------------------------------------------------------------------------------------------------------   Review of Systems   Constitutional:  Negative for chills and fever.   HENT:  Negative for congestion and rhinorrhea.    Eyes:  Negative for pain and redness.   Respiratory:  Negative for cough and shortness of breath.    Cardiovascular:  Positive for leg swelling. Negative for chest pain.   Gastrointestinal:  Negative for nausea and vomiting.   Genitourinary:  Negative for difficulty urinating and frequency.   Musculoskeletal:  Negative for back pain and gait problem.   Skin:  Positive for wound.   Neurological:  Negative for dizziness and weakness.   Hematological:  Does not bruise/bleed easily.   Psychiatric/Behavioral:  Negative for confusion. The patient is not nervous/anxious.       ---------------------------------------------------------------------------------------------------------------------   Past Medical History:   Diagnosis Date    Acid reflux     Arthritis     CRP/ sed rate elevated, sees rheumatologist, questionable AI disease    Fatigue     Gastroparesis     Goiter 2016    Gout     has prednisone prn to take for flares, last 2019    Hip arthrosis 2017    Hirsutism 2000    Hyperlipidemia      Hypertension     Hypocalcemia     Hyponatremia 04/01/22    After having nausea and vomiting for several days was noted with low sodium.    Hypoparathyroidism     on cacium supplements and Vit D    Knee swelling 06/2021    Lower extremity edema     Microcytic erythrocytes     Morbid obesity with BMI of 45.0-49.9, adult     Obesity     Polycystic ovary syndrome 12/2000    Renal calculi     S/P rukhsana     gallstones    Sleep apnea     CPAP compliant    Type 2 diabetes mellitus     dx 2001, on insulin since 1/2021, last A1C 6.8, sees endocrine     Past Surgical History:   Procedure Laterality Date    COLONOSCOPY  2019    ELBOW PROCEDURE Left 06/2001    Surgery done after fall with fracture of left elbow.    ENDOSCOPY N/A 09/08/2022    Procedure: ESOPHAGOGASTRODUODENOSCOPY;  Surgeon: Evans Salgado MD;  Location:  [facility];  Service: Bariatric;  Laterality: N/A;    FOOT SURGERY Right     Cyst 2003    GASTRECTOMY N/A 09/08/2022    Procedure: GASTRECTOMY LAPAROSCOPIC;  Surgeon: Evans Salgado MD;  Location:  [facility];  Service: Bariatric;  Laterality: N/A;    HIATAL HERNIA REPAIR N/A 09/08/2022    Procedure: HIATAL HERNIA REPAIR LAPAROSCOPIC;  Surgeon: Evans Salgado MD;  Location:  [facility];  Service: Bariatric;  Laterality: N/A;  [id number]; resumed [id number]    LAPAROSCOPIC CHOLECYSTECTOMY  2006    gallstones    SHOULDER SURGERY Left      Family History   Problem Relation Age of Onset    Hypertension Mother     Hyperlipidemia Mother     Lupus Mother     Cancer Mother     Pulmonary embolism Mother     Obesity Mother     Diabetes Mother     Osteoarthritis Mother     Gout Mother     Arthritis Mother     Cancer Father     Gout Father     Osteoarthritis Father     Diabetes Father     Kidney failure Father     Hypertension Father     Hyperlipidemia Father     Obesity Father     Sleep apnea Father     Hyperlipidemia Sister     Hypertension Sister     Diabetes Sister     Pulmonary embolism Sister     Obesity  "Sister     Sleep apnea Sister     Miscarriages / Stillbirths Sister     Pulmonary embolism Sister     Hypertension Brother     Hyperlipidemia Brother     Diabetes Brother     Obesity Brother     Sleep apnea Brother     Obesity Maternal Grandmother     Diabetes Maternal Grandmother     Hypertension Maternal Grandmother     Pulmonary embolism Maternal Grandmother     Hypertension Maternal Grandfather     Pulmonary embolism Maternal Grandfather     Obesity Paternal Grandmother     Diabetes Paternal Grandmother     Hypertension Paternal Grandmother     Sleep apnea Paternal Grandmother     Obesity Paternal Grandfather     Hypertension Paternal Grandfather     Heart attack Paternal Grandfather     Arthritis Other     Colon cancer Other     Hyperlipidemia Other     Hypertension Other     Obesity Other     Diabetes Other     Kidney disease Other     Thyroid disease Other     Stroke Other      Social History     Socioeconomic History    Marital status: Single   Tobacco Use    Smoking status: Never    Smokeless tobacco: Never   Vaping Use    Vaping status: Never Used   Substance and Sexual Activity    Alcohol use: Never    Drug use: Never    Sexual activity: Not Currently     ---------------------------------------------------------------------------------------------------------------------   Allergies:  Penicillins and Rocephin [ceftriaxone]  ---------------------------------------------------------------------------------------------------------------------  Objective     ---------------------------------------------------------------------------------------------------------------------   Vital Signs:  There were no vitals taken for this visit.  Estimated body mass index is 36.73 kg/m² as calculated from the following:    Height as of 4/9/24: 162.6 cm (64\").    Weight as of 4/9/24: 97.1 kg (214 lb).  There is no height or weight on file to calculate BMI.  Wt Readings from Last 3 Encounters:   04/09/24 97.1 kg (214 lb) "   09/26/23 95.7 kg (211 lb)   09/13/23 91.6 kg (202 lb)       ---------------------------------------------------------------------------------------------------------------------   Physical Exam  Wound Assessment:  Location: Right, foot  Wound Measurements: 1.4 X 0.5 X 0.1cm   Tunneling: No Undermining: No  Dressing Appearance: dressingappearance: open to air  Closure: NA  Changes since last exam: this is initial exam  Etiology and classification: {wound etiology:87936}  Wound bed structures/characteristics: {wound structures:84027}  Edges {wound drainage :37767}  Periwound characteristics: {periwound characteristics:19396}  Periwound Temperature: {skin temperature :03393}  Drainage characteristics: {wound drainage :60562}  Perfusion characteristics: {perfusion characteristics:51324}    Wound Goal (s):{Jenn Multiple:64071}  Assessment & Plan      Patient Active Problem List    Diagnosis     Fatigue [R53.83]     S/P rukhsana [Z90.49]     Nodular goiter [E04.9]     SANYA on CPAP [G47.33]     Arthritis [M19.90]     GERD (gastroesophageal reflux disease) [K21.9]     Gout [M10.9]     Mixed hyperlipidemia [E78.2]     Idiopathic hypoparathyroidism [E20.0]     Essential hypertension [I10]     Type 2 diabetes mellitus with hyperglycemia, without long-term current use of insulin [E11.65]         Clinical Impression:{Jenn Multiple:09415}    Follow-up: {[facility] HEARING AID FOLLOW UP:67999}    SHARON Wolfe,S  [facility]  09/12/24  14:01 EDT

## 2024-09-13 VITALS
HEART RATE: 79 BPM | RESPIRATION RATE: 19 BRPM | HEIGHT: 63 IN | SYSTOLIC BLOOD PRESSURE: 145 MMHG | TEMPERATURE: 98 F | DIASTOLIC BLOOD PRESSURE: 80 MMHG | BODY MASS INDEX: 37.03 KG/M2 | WEIGHT: 209 LBS

## 2024-09-23 PROBLEM — L97.512: Status: ACTIVE | Noted: 2024-09-23

## 2024-09-27 DIAGNOSIS — E55.9 VITAMIN D DEFICIENCY: ICD-10-CM

## 2024-09-29 DIAGNOSIS — E11.65 TYPE 2 DIABETES MELLITUS WITH HYPERGLYCEMIA, WITHOUT LONG-TERM CURRENT USE OF INSULIN: ICD-10-CM

## 2024-09-29 RX ORDER — SEMAGLUTIDE 0.68 MG/ML
0.5 INJECTION, SOLUTION SUBCUTANEOUS WEEKLY
Qty: 3 ML | Refills: 5 | Status: SHIPPED | OUTPATIENT
Start: 2024-09-29

## 2024-09-30 ENCOUNTER — PRIOR AUTHORIZATION (OUTPATIENT)
Dept: ENDOCRINOLOGY | Facility: CLINIC | Age: 44
End: 2024-09-30
Payer: COMMERCIAL

## 2024-09-30 RX ORDER — ERGOCALCIFEROL 1.25 MG/1
50000 CAPSULE, LIQUID FILLED ORAL
Qty: 4 CAPSULE | Refills: 11 | Status: SHIPPED | OUTPATIENT
Start: 2024-09-30

## 2024-09-30 NOTE — TELEPHONE ENCOUNTER
Rx Refill Note    Requested Prescriptions     Pending Prescriptions Disp Refills    vitamin D (ERGOCALCIFEROL) 1.25 MG (71027 UT) capsule capsule [Pharmacy Med Name: VITAMIN D2 1.25MG(50,000 UNIT)] 4 capsule 11     Sig: TAKE 1 CAPSULE BY MOUTH EVERY 7 DAYS.        Last office visit with prescribing clinician: 4/9/2024       Next office visit with prescribing clinician: 10/14/2024

## 2024-09-30 NOTE — TELEPHONE ENCOUNTER
Natty Fuentes (Key: [id number])  Rx #: [id number]  Ozempic (0.25 or 0.5 MG/DOSE) 2MG/3ML pen-injectors  Form  [employer] Form (2017 [employer])  Created  8 hours ago  Sent to Plan  7 hours ago  Plan Response  7 hours ago  Submit Clinical Questions  7 hours ago  Determination  Favorable  6 hours ago  Message from Plan  PA Case: [id number], Status: Approved, Coverage Starts on: 9/30/2024 12:00:00 AM, Coverage Ends on: 9/30/2025 12:00:00 AM.. Authorization Expiration Date: September 30, 2025.    Phar notified

## 2024-10-14 ENCOUNTER — OFFICE VISIT (OUTPATIENT)
Dept: ENDOCRINOLOGY | Facility: CLINIC | Age: 44
End: 2024-10-14
Payer: COMMERCIAL

## 2024-10-14 VITALS
OXYGEN SATURATION: 98 % | WEIGHT: 213 LBS | SYSTOLIC BLOOD PRESSURE: 100 MMHG | DIASTOLIC BLOOD PRESSURE: 62 MMHG | HEART RATE: 75 BPM | HEIGHT: 63 IN | BODY MASS INDEX: 37.74 KG/M2

## 2024-10-14 DIAGNOSIS — I10 ESSENTIAL HYPERTENSION: ICD-10-CM

## 2024-10-14 DIAGNOSIS — E20.0 IDIOPATHIC HYPOPARATHYROIDISM: ICD-10-CM

## 2024-10-14 DIAGNOSIS — D64.9 ANEMIA, UNSPECIFIED TYPE: ICD-10-CM

## 2024-10-14 DIAGNOSIS — E55.9 VITAMIN D DEFICIENCY: ICD-10-CM

## 2024-10-14 DIAGNOSIS — E11.65 TYPE 2 DIABETES MELLITUS WITH HYPERGLYCEMIA, WITHOUT LONG-TERM CURRENT USE OF INSULIN: Primary | ICD-10-CM

## 2024-10-14 DIAGNOSIS — E78.2 MIXED HYPERLIPIDEMIA: ICD-10-CM

## 2024-10-14 LAB
EXPIRATION DATE: ABNORMAL
GLUCOSE BLDC GLUCOMTR-MCNC: 158 MG/DL (ref 70–130)
Lab: ABNORMAL

## 2024-10-14 PROCEDURE — 83540 ASSAY OF IRON: CPT | Performed by: INTERNAL MEDICINE

## 2024-10-14 PROCEDURE — 82306 VITAMIN D 25 HYDROXY: CPT | Performed by: INTERNAL MEDICINE

## 2024-10-14 PROCEDURE — 82728 ASSAY OF FERRITIN: CPT | Performed by: INTERNAL MEDICINE

## 2024-10-14 PROCEDURE — 82947 ASSAY GLUCOSE BLOOD QUANT: CPT | Performed by: INTERNAL MEDICINE

## 2024-10-14 PROCEDURE — 84466 ASSAY OF TRANSFERRIN: CPT | Performed by: INTERNAL MEDICINE

## 2024-10-14 PROCEDURE — 99214 OFFICE O/P EST MOD 30 MIN: CPT | Performed by: INTERNAL MEDICINE

## 2024-10-14 NOTE — PROGRESS NOTES
Chief Complaint   Patient presents with    Diabetes     Follow-up     HPI:   Natty Fuentes is a 44 y.o.female who returns to [facility] for f/u evaluation of multiple medical problems. Last visit 04/09/2024.  Her history is as follows:    Interim Events:   - Mild wt gain since last visit  - Pt's A1C% was 9.1% recently at her PCP's office. She reports more foods excursions. I added back Ozempic on 09/29/2024.  - pt had labs completed on 09/28/2024, see below    1) idiopathic hypoparathyroidism:  - diagnosed in 2011 after presenting with symptomatic hypocalcemia  - etiology presumed infiltrative vs autoimmune hypoparathyroidism  - Taking calcitriol 0.5 mcg daily, Caltrate + D 600 mg BID w/ food  - HCTZ 25 mg daily to reduce hypercalciuria   - last 24 hr urine calcium, Cr: (09/2020): Calcium 175 mg/24 hr, Cr 2.10 gm/24 hr (0.60 - 1.80), urine volume 2,750 mL    2) vitamin D deficiency: controlled  - is on ergocalciferol 50,000 IU weekly    3) Type 2 DM with no associated complications at this time:  - diagnosed in 2013  - did not tolerate Victoza, Trulicity in the past.   - did not tolerate Januvia   - Started insulin in 01/2020 after hospitalization for pneumonia  - Tolerated Rybelsus 3 mg only, not higher doses  - Had recurrent vaginal candidiasis on Jardiance  - -Is s/p sleeve gastrectomy at  on 09/08/2022. Pt had lost a total 70-80 lbs. Was able to stop most DM medications  - 2023: Pt did not tolerate Mounjaro 5 mg weekly due to constipation    Current Medications:  Metformin 1000 mg BID  Ozempic 0.25 mg weekly: Reports she is tolerating this dose.     Glucometer / BG Log: No readings for review today    DM Health Maintenance:  Ophtho: Last visit - 2017, no retinopathy per pt  Podiatry: Last visit - no recent visit  Monofilament / Foot exam: DUE  Lipids: see below  Urine microalbumin/Cr ratio: (08/2023) 30.5  TSH: (09/2024) normal at 1.380  CBC: (09/2024) Hgb 11.7   CMP: (09/2024): Cr 0.96, GFR 75.0,  "LFTs WNL, Ca 8.1, Alb 4.2  Vit B12: (09/2024) 676    4) Essential HTN:  - pt taking lisinopril 20 mg daily, HCTZ 25 mg daily  - past evaluation (2016) for secondary causes of HTN showed no endocrine pathology (aldosterone/renin -normal, 1 mg dex suppression test -normal)    5) mixed hyperlipidemia:   Lipid Panel (09/2024): Tchol 126,  (on fenofibrate), HDL 26, LDL N/C  - taking atorvastatin 40 mg, fenofibrate 145 mg daily    6) nontoxic nodular goiter:   (10/04/2011): Pt had a thyroid US and US-Guided FNA of a right lobe/isthmus nodule by  Endocrinology. The US report described a \" nodule measuring 1.74 x 1.06 x 1.31 cm in size is complex, hypoechoic, with indistinct borders. A few small calcifications without coma tail. It has a complex cystic-solid consistency and a grade II vascularity.\"    (10/04/2011): Cytology Report: \"DIAGNOSIS ULTRASOUND GUIDED FNA, RIGHT THYROID NODULE:  ABUNDANT COLLOID, RARE MACROPHAGES, AND SCANT FOLLICULAR GROUPS\"    - Repeat thyroid US was completed in clinic 03/2019:  The thyroid gland was mildly enlarged in size by measured dimensions. Finding are consistent with nodular goiter. No discrete nodules were identified in the left lobe. At the junction of the right lobe and isthmus, a 0.99(L) x 0.68(AP) x 0.80(T) cm hypoechoic mixed cystic and solid nodule was identified. The nodule's margin was not well defined. The nodule had minimal peripheral vascularity and no microcalcifications. Course linear calcifications were present. Although imaging was not available for comparison, the nodule appeared to be significantly smaller compared to the outside US report description from 10/2011 and had similar imaging characteristics. Two slightly enlarged left, level III cervical lymph nodes were identified with US characteristics suggestive of benign lymph nodes as described above. No pathologic lymph nodes were seen. Specifically, no superficial mass or enlarged lymph nodes were noted in " "the left supraclavicular area where patient describes pain and swelling.   RECOMMENDATIONS: Repeat Thyroid US recommended if changes in the gland/neck are noted on physical exam    Other medical history:   - gout: on allopurinol  - SANYA on CPAP,   - h/o GERD: currently does not require medication    Review of Systems   Constitutional: Negative.         Mild wt gain since last visit   HENT: Negative.  Negative for sore throat and trouble swallowing.    Eyes: Negative.    Respiratory: Negative.     Cardiovascular: Negative.    Gastrointestinal:  Positive for constipation (intermittent).   Endocrine: Negative.    Genitourinary: Negative.    Musculoskeletal:  Positive for arthralgias.   Skin: Negative.    Allergic/Immunologic: Negative.    Neurological: Negative.    Hematological: Negative.    Psychiatric/Behavioral: Negative.       The following portions of the patient's history were reviewed and updated as appropriate: allergies, current medications, past family history, past medical history, past social history, past surgical history and problem list.    /62   Pulse 75   Ht 160 cm (62.99\")   Wt 96.6 kg (213 lb)   SpO2 98%   BMI 37.74 kg/m²   Physical Exam   Constitutional: She is oriented to person, place, and time. She appears well-developed. No distress.   HENT:   Head: Normocephalic.   Eyes: Pupils are equal, round, and reactive to light. Conjunctivae are normal.   Neck: No tracheal deviation present. No thyromegaly present.   No palpable thyroid nodules. No tenderness to palpation     Cardiovascular: Normal rate, regular rhythm and normal heart sounds.   No murmur heard.  Pulmonary/Chest: Effort normal and breath sounds normal. No respiratory distress.   Abdominal: Soft. Normal appearance. There is no abdominal tenderness.   Lymphadenopathy:     She has no cervical adenopathy.   Neurological: She is alert and oriented to person, place, and time. No cranial nerve deficit.   Skin: Skin is warm and dry. " She is not diaphoretic. No erythema.   No acanthosis nigricans   Psychiatric: Her behavior is normal.   Vitals reviewed.     LABS/IMAGING:  Outside labs reviewed and summarized in HPI  Office Visit on 10/14/2024   Component Date Value Ref Range Status    Glucose 10/14/2024 158 (A)  70 - 130 mg/dL Final    Lot Number 10/14/2024 [id number]   Final    Expiration Date 10/14/2024 5/24/2025   Final    25 Hydroxy, Vitamin D 10/14/2024 52.6  30.0 - 100.0 ng/ml Final    Iron 10/14/2024 48  37 - 145 mcg/dL Final    Iron Saturation (TSAT) 10/14/2024 9 (L)  20 - 50 % Final    Transferrin 10/14/2024 341  200 - 360 mg/dL Final    TIBC 10/14/2024 508  298 - 536 mcg/dL Final    Ferritin 10/14/2024 34.20  13.00 - 150.00 ng/mL Final     Outside labs from 09/28/2024 reviewed and summarized in HPI    ASSESSMENT/PLAN:  1) Type 2 DM w/o complication at this time: uncontrolled with hyperglycemia, A1C% was 9.1 in 09/2024.    - did not tolerate Victoza, Trulicity, or Januvia in the past   - Pt having constipation on Rybelsus 7 mg and Mounjaro 2.5 mg and 5 mg    - Continue metformin 1000 mg BID   - Increase Ozempic to 0.5 mg weekly. Reviewed potential GI side effects and advised pt to take stool softener regularly    2) idiopathic hypoparathyroidism: controlled  - CMP checked on 09/28/2024, Calcium at goal at 8.1  - d/w pt that goal calcium is at the lower end of the range to minimize calcium-phosphorus product deposition  - continue calcitriol 0.5 mcg daily, Caltrate + D 600 mg BID w/ food  - HCTZ 25 mg daily for HTN and to reduce hypercalciuria      3) mixed hyperlipidemia  Lipid Panel (09/2024): Tchol 126,  (on fenofibrate), HDL 26, LDL N/C  - Recent increase in TG related to recent hyperglycemia  - continue atorvastatin 40 mg nightly  - Continue fenofibrate 145 mg daily     4) essential HTN:  - systolic  today.   - will decrease Lisinopril to 10 mg daily  - Continue HCTZ 25 mg daily    5) vitamin D deficiency: controlled  -  vitamin D 25 -OH today 52.6  - continue ergocalciferol 50,000 IU weekly    6) anemia, unspecified:   - iron panel, ferritin checked today. No iron deficiency noted.     RTC 6 months    Electronically Signed: Therese Cintron MD

## 2024-10-15 LAB
25(OH)D3 SERPL-MCNC: 52.6 NG/ML (ref 30–100)
FERRITIN SERPL-MCNC: 34.2 NG/ML (ref 13–150)
IRON 24H UR-MRATE: 48 MCG/DL (ref 37–145)
IRON SATN MFR SERPL: 9 % (ref 20–50)
TIBC SERPL-MCNC: 508 MCG/DL (ref 298–536)
TRANSFERRIN SERPL-MCNC: 341 MG/DL (ref 200–360)

## 2024-12-26 DIAGNOSIS — E11.65 TYPE 2 DIABETES MELLITUS WITH HYPERGLYCEMIA, WITHOUT LONG-TERM CURRENT USE OF INSULIN: Primary | ICD-10-CM

## 2024-12-26 RX ORDER — TIRZEPATIDE 2.5 MG/.5ML
2.5 INJECTION, SOLUTION SUBCUTANEOUS WEEKLY
Qty: 2 ML | Refills: 3 | Status: SHIPPED | OUTPATIENT
Start: 2024-12-26

## 2024-12-27 ENCOUNTER — PRIOR AUTHORIZATION (OUTPATIENT)
Dept: ENDOCRINOLOGY | Facility: CLINIC | Age: 44
End: 2024-12-27
Payer: COMMERCIAL

## 2024-12-30 NOTE — TELEPHONE ENCOUNTER
Approved on December 27 by [employer] 2017  PA Case: [id number], Status: Approved, Coverage Starts on: 12/27/2024 12:00:00 AM, Coverage Ends on: 12/27/2025 12:00:00 AM.  Authorization Expiration Date: 12/26/2025  Drug  Mounjaro 2.5MG/0.5ML auto-injectors  ePA cloud logo  Form  [employer] Form (2017

## 2025-03-04 DIAGNOSIS — E11.65 TYPE 2 DIABETES MELLITUS WITH HYPERGLYCEMIA, WITHOUT LONG-TERM CURRENT USE OF INSULIN: Primary | ICD-10-CM

## 2025-03-04 RX ORDER — TIRZEPATIDE 5 MG/.5ML
5 INJECTION, SOLUTION SUBCUTANEOUS WEEKLY
Qty: 2 ML | Refills: 5 | Status: SHIPPED | OUTPATIENT
Start: 2025-03-04

## 2025-04-14 ENCOUNTER — OFFICE VISIT (OUTPATIENT)
Dept: ENDOCRINOLOGY | Facility: CLINIC | Age: 45
End: 2025-04-14
Payer: COMMERCIAL

## 2025-04-14 VITALS
HEART RATE: 74 BPM | OXYGEN SATURATION: 98 % | SYSTOLIC BLOOD PRESSURE: 108 MMHG | BODY MASS INDEX: 34.83 KG/M2 | HEIGHT: 64 IN | WEIGHT: 204 LBS | DIASTOLIC BLOOD PRESSURE: 62 MMHG

## 2025-04-14 DIAGNOSIS — E11.9 TYPE 2 DIABETES MELLITUS WITHOUT COMPLICATION, WITHOUT LONG-TERM CURRENT USE OF INSULIN: Primary | ICD-10-CM

## 2025-04-14 DIAGNOSIS — E55.9 VITAMIN D DEFICIENCY: ICD-10-CM

## 2025-04-14 DIAGNOSIS — E78.2 MIXED HYPERLIPIDEMIA: ICD-10-CM

## 2025-04-14 DIAGNOSIS — E20.0 IDIOPATHIC HYPOPARATHYROIDISM: ICD-10-CM

## 2025-04-14 DIAGNOSIS — I10 ESSENTIAL HYPERTENSION: ICD-10-CM

## 2025-04-14 LAB
ALBUMIN SERPL-MCNC: 4.2 G/DL (ref 3.5–5.2)
ALBUMIN UR-MCNC: <1.2 MG/DL
ALBUMIN/GLOB SERPL: 1.6 G/DL
ALP SERPL-CCNC: 53 U/L (ref 39–117)
ALT SERPL W P-5'-P-CCNC: 19 U/L (ref 1–33)
ANION GAP SERPL CALCULATED.3IONS-SCNC: 14 MMOL/L (ref 5–15)
AST SERPL-CCNC: 21 U/L (ref 1–32)
BILIRUB SERPL-MCNC: 0.5 MG/DL (ref 0–1.2)
BUN SERPL-MCNC: 12 MG/DL (ref 6–20)
BUN/CREAT SERPL: 13.2 (ref 7–25)
CALCIUM SPEC-SCNC: 9.2 MG/DL (ref 8.6–10.5)
CHLORIDE SERPL-SCNC: 102 MMOL/L (ref 98–107)
CHOLEST SERPL-MCNC: 160 MG/DL (ref 0–200)
CO2 SERPL-SCNC: 24 MMOL/L (ref 22–29)
CREAT SERPL-MCNC: 0.91 MG/DL (ref 0.57–1)
CREAT UR-MCNC: 103.6 MG/DL
DEPRECATED RDW RBC AUTO: 48.2 FL (ref 37–54)
EGFRCR SERPLBLD CKD-EPI 2021: 79.4 ML/MIN/1.73
ERYTHROCYTE [DISTWIDTH] IN BLOOD BY AUTOMATED COUNT: 16 % (ref 12.3–15.4)
EXPIRATION DATE: ABNORMAL
EXPIRATION DATE: NORMAL
GLOBULIN UR ELPH-MCNC: 2.7 GM/DL
GLUCOSE BLDC GLUCOMTR-MCNC: 101 MG/DL (ref 70–130)
GLUCOSE SERPL-MCNC: 99 MG/DL (ref 65–99)
HBA1C MFR BLD: 6.8 % (ref 4.5–5.7)
HCT VFR BLD AUTO: 36.6 % (ref 34–46.6)
HDLC SERPL-MCNC: 34 MG/DL (ref 40–60)
HGB BLD-MCNC: 11.9 G/DL (ref 12–15.9)
LDLC SERPL CALC-MCNC: 78 MG/DL (ref 0–100)
LDLC/HDLC SERPL: 1.98 {RATIO}
Lab: ABNORMAL
Lab: NORMAL
MCH RBC QN AUTO: 27.1 PG (ref 26.6–33)
MCHC RBC AUTO-ENTMCNC: 32.5 G/DL (ref 31.5–35.7)
MCV RBC AUTO: 83.4 FL (ref 79–97)
MICROALBUMIN/CREAT UR: NORMAL MG/G{CREAT}
PLATELET # BLD AUTO: 313 10*3/MM3 (ref 140–450)
PMV BLD AUTO: 10.4 FL (ref 6–12)
POTASSIUM SERPL-SCNC: 3.4 MMOL/L (ref 3.5–5.2)
PROT SERPL-MCNC: 6.9 G/DL (ref 6–8.5)
RBC # BLD AUTO: 4.39 10*6/MM3 (ref 3.77–5.28)
SODIUM SERPL-SCNC: 140 MMOL/L (ref 136–145)
TRIGL SERPL-MCNC: 294 MG/DL (ref 0–150)
TSH SERPL DL<=0.05 MIU/L-ACNC: 1.94 UIU/ML (ref 0.27–4.2)
VLDLC SERPL-MCNC: 48 MG/DL (ref 5–40)
WBC NRBC COR # BLD AUTO: 7.38 10*3/MM3 (ref 3.4–10.8)

## 2025-04-14 PROCEDURE — 80061 LIPID PANEL: CPT | Performed by: INTERNAL MEDICINE

## 2025-04-14 PROCEDURE — 82947 ASSAY GLUCOSE BLOOD QUANT: CPT | Performed by: INTERNAL MEDICINE

## 2025-04-14 PROCEDURE — 80050 GENERAL HEALTH PANEL: CPT | Performed by: INTERNAL MEDICINE

## 2025-04-14 PROCEDURE — 82043 UR ALBUMIN QUANTITATIVE: CPT | Performed by: INTERNAL MEDICINE

## 2025-04-14 PROCEDURE — 82607 VITAMIN B-12: CPT | Performed by: INTERNAL MEDICINE

## 2025-04-14 PROCEDURE — 82570 ASSAY OF URINE CREATININE: CPT | Performed by: INTERNAL MEDICINE

## 2025-04-14 PROCEDURE — 82306 VITAMIN D 25 HYDROXY: CPT | Performed by: INTERNAL MEDICINE

## 2025-04-14 PROCEDURE — 99214 OFFICE O/P EST MOD 30 MIN: CPT | Performed by: INTERNAL MEDICINE

## 2025-04-14 PROCEDURE — 83036 HEMOGLOBIN GLYCOSYLATED A1C: CPT | Performed by: INTERNAL MEDICINE

## 2025-04-14 RX ORDER — AVOBENZONE, HOMOSALATE, OCTISALATE, OCTOCRYLENE 30; 40; 45; 26 MG/ML; MG/ML; MG/ML; MG/ML
1 CREAM TOPICAL 2 TIMES DAILY
Qty: 200 EACH | Refills: 3 | Status: SHIPPED | OUTPATIENT
Start: 2025-04-14

## 2025-04-14 RX ORDER — TIRZEPATIDE 7.5 MG/.5ML
7.5 INJECTION, SOLUTION SUBCUTANEOUS WEEKLY
Qty: 6 ML | Refills: 1 | Status: SHIPPED | OUTPATIENT
Start: 2025-04-14

## 2025-04-14 RX ORDER — BLOOD-GLUCOSE METER
1 KIT MISCELLANEOUS TAKE AS DIRECTED
Qty: 1 EACH | Refills: 0 | Status: SHIPPED | OUTPATIENT
Start: 2025-04-14

## 2025-04-14 NOTE — PROGRESS NOTES
Chief Complaint   Patient presents with    Diabetes     Follow-up      hypoparathyroidism      Follow-up     HPI:   Natty Fuentes is a 44 y.o.female who returns to [facility]ocrine Clinic for f/u evaluation of multiple medical problems. Last visit 10/14/2024.  Her history is as follows:    Interim Events:   - Had RSV in 01/2025, COVID in 02/2025, and the flu in 03/2025.   - Reports tolerating Mounjaro 5 mg   - Has lost 10 lbs since her last visit  - Blood pressure has been much lower since her last visit    1) idiopathic hypoparathyroidism:  - diagnosed in 2011 after presenting with symptomatic hypocalcemia  - etiology presumed infiltrative vs autoimmune hypoparathyroidism  - Taking calcitriol 0.5 mcg daily, Caltrate + D 600 mg BID w/ food  - HCTZ 25 mg daily to reduce hypercalciuria   - last 24 hr urine calcium, Cr: (09/2020): Calcium 175 mg/24 hr, Cr 2.10 gm/24 hr (0.60 - 1.80), urine volume 2,750 mL    2) vitamin D deficiency:  - is on ergocalciferol 50,000 IU weekly    3) Type 2 DM with no associated complications at this time:  - diagnosed in 2013  - did not tolerate Victoza, Trulicity in the past.   - did not tolerate Januvia   - Started insulin in 01/2020 after hospitalization for pneumonia  - Tolerated Rybelsus 3 mg only, not higher doses  - Had recurrent vaginal candidiasis on Jardiance  - -Is s/p sleeve gastrectomy at  on 09/08/2022. Pt had lost a total 70-80 lbs. Was able to stop most DM medications  - 2023: Pt took Mounjaro 5 mg and overall tolerated, but had constipation  - 12/2024: Changed Ozempic back to mounjaro after pt reported persistent nausea again on the low-dose Ozempic    Current Medications:  Metformin 1000 mg BID  Mounjaro 5 mg weekly: Reports she is tolerating this dose. Takes Miralax PRN    Glucometer / BG Log: No readings for review today    DM Health Maintenance:  Ophtho: Last visit - 2017, no retinopathy per pt  Podiatry: Last visit - no recent visit  Monofilament / Foot exam:  "DUE  Lipids: see below  Urine microalbumin/Cr ratio: (04/2025) <1.2  TSH: (04/2025) normal at 1.940  CBC: (04/2025) Hgb 11.9   CMP: (04/2025): Cr 0.91, GFR 79.4, LFTs WNL, Ca 9.2, Alb 4.2  Vit B12: (04/2025) 494    4) Essential HTN:  - pt taking lisinopril 10 mg daily, HCTZ 25 mg daily  - past evaluation (2016) for secondary causes of HTN showed no endocrine pathology (aldosterone/renin -normal, 1 mg dex suppression test -normal)  - Blood pressure has been much lower since her last visit    5) mixed hyperlipidemia:   Lipid Panel (04/2025): Tchol 160, , HDL 34 , LDL 78  - taking atorvastatin 40 mg, fenofibrate 145 mg daily    6) nontoxic nodular goiter:   (10/04/2011): Pt had a thyroid US and US-Guided FNA of a right lobe/isthmus nodule by  Endocrinology. The US report described a \" nodule measuring 1.74 x 1.06 x 1.31 cm in size is complex, hypoechoic, with indistinct borders. A few small calcifications without coma tail. It has a complex cystic-solid consistency and a grade II vascularity.\"    (10/04/2011): Cytology Report: \"DIAGNOSIS ULTRASOUND GUIDED FNA, RIGHT THYROID NODULE:  ABUNDANT COLLOID, RARE MACROPHAGES, AND SCANT FOLLICULAR GROUPS\"    - Repeat thyroid US was completed in clinic 03/2019:  The thyroid gland was mildly enlarged in size by measured dimensions. Finding are consistent with nodular goiter. No discrete nodules were identified in the left lobe. At the junction of the right lobe and isthmus, a 0.99(L) x 0.68(AP) x 0.80(T) cm hypoechoic mixed cystic and solid nodule was identified. The nodule's margin was not well defined. The nodule had minimal peripheral vascularity and no microcalcifications. Course linear calcifications were present. Although imaging was not available for comparison, the nodule appeared to be significantly smaller compared to the outside US report description from 10/2011 and had similar imaging characteristics. Two slightly enlarged left, level III cervical lymph nodes " "were identified with US characteristics suggestive of benign lymph nodes as described above. No pathologic lymph nodes were seen. Specifically, no superficial mass or enlarged lymph nodes were noted in the left supraclavicular area where patient describes pain and swelling.   RECOMMENDATIONS: Repeat Thyroid US recommended if changes in the gland/neck are noted on physical exam    Other medical history:   - gout: on allopurinol  - SANYA on CPAP,   - h/o GERD: currently does not require medication    Review of Systems   Constitutional: Negative.         Mild wt loss since last visit   HENT: Negative.  Negative for sore throat and trouble swallowing.    Eyes: Negative.    Respiratory: Negative.     Cardiovascular: Negative.    Gastrointestinal:  Negative for constipation (intermittent).   Endocrine: Negative.    Genitourinary: Negative.    Musculoskeletal:  Positive for arthralgias.   Skin: Negative.    Allergic/Immunologic: Negative.    Neurological: Negative.    Hematological: Negative.    Psychiatric/Behavioral: Negative.       The following portions of the patient's history were reviewed and updated as appropriate: allergies, current medications, past family history, past medical history, past social history, past surgical history and problem list.      /62 (BP Location: Right arm, Patient Position: Sitting, Cuff Size: Adult)   Pulse 74   Ht 162.6 cm (64\")   Wt 92.5 kg (204 lb)   SpO2 98%   BMI 35.02 kg/m²   Physical Exam   Constitutional: She is oriented to person, place, and time. She appears well-developed. No distress.   HENT:   Head: Normocephalic.   Eyes: Pupils are equal, round, and reactive to light. Conjunctivae are normal.   Neck: No tracheal deviation present. No thyromegaly present.   No palpable thyroid nodules. No tenderness to palpation     Cardiovascular: Normal rate, regular rhythm and normal heart sounds.   No murmur heard.  Pulmonary/Chest: Effort normal and breath sounds normal. No " respiratory distress.   Abdominal: Soft. Normal appearance. There is no abdominal tenderness.   Lymphadenopathy:     She has no cervical adenopathy.   Neurological: She is alert and oriented to person, place, and time. No cranial nerve deficit.   Skin: Skin is warm and dry. She is not diaphoretic. No erythema.   No acanthosis nigricans   Psychiatric: Her behavior is normal.   Vitals reviewed.     LABS/IMAGING:  Outside labs reviewed and summarized in HPI  Office Visit on 04/14/2025   Component Date Value Ref Range Status    Hemoglobin A1C 04/14/2025 6.8 (A)  4.5 - 5.7 % Final    Lot Number 04/14/2025 [id number]   Final    Expiration Date 04/14/2025 1/02/2027   Final    Glucose 04/14/2025 101  70 - 130 mg/dL Final    Lot Number 04/14/2025 [id number]   Final    Expiration Date 04/14/2025 9/11/2025   Final    WBC 04/14/2025 7.38  3.40 - 10.80 10*3/mm3 Final    RBC 04/14/2025 4.39  3.77 - 5.28 10*6/mm3 Final    Hemoglobin 04/14/2025 11.9 (L)  12.0 - 15.9 g/dL Final    Hematocrit 04/14/2025 36.6  34.0 - 46.6 % Final    MCV 04/14/2025 83.4  79.0 - 97.0 fL Final    MCH 04/14/2025 27.1  26.6 - 33.0 pg Final    MCHC 04/14/2025 32.5  31.5 - 35.7 g/dL Final    RDW 04/14/2025 16.0 (H)  12.3 - 15.4 % Final    RDW-SD 04/14/2025 48.2  37.0 - 54.0 fl Final    MPV 04/14/2025 10.4  6.0 - 12.0 fL Final    Platelets 04/14/2025 313  140 - 450 10*3/mm3 Final    Glucose 04/14/2025 99  65 - 99 mg/dL Final    BUN 04/14/2025 12  6 - 20 mg/dL Final    Creatinine 04/14/2025 0.91  0.57 - 1.00 mg/dL Final    Sodium 04/14/2025 140  136 - 145 mmol/L Final    Potassium 04/14/2025 3.4 (L)  3.5 - 5.2 mmol/L Final    Chloride 04/14/2025 102  98 - 107 mmol/L Final    CO2 04/14/2025 24.0  22.0 - 29.0 mmol/L Final    Calcium 04/14/2025 9.2  8.6 - 10.5 mg/dL Final    Total Protein 04/14/2025 6.9  6.0 - 8.5 g/dL Final    Albumin 04/14/2025 4.2  3.5 - 5.2 g/dL Final    ALT (SGPT) 04/14/2025 19  1 - 33 U/L Final    AST (SGOT) 04/14/2025 21  1 - 32 U/L Final     Alkaline Phosphatase 04/14/2025 53  39 - 117 U/L Final    Total Bilirubin 04/14/2025 0.5  0.0 - 1.2 mg/dL Final    Globulin 04/14/2025 2.7  gm/dL Final    A/G Ratio 04/14/2025 1.6  g/dL Final    BUN/Creatinine Ratio 04/14/2025 13.2  7.0 - 25.0 Final    Anion Gap 04/14/2025 14.0  5.0 - 15.0 mmol/L Final    eGFR 04/14/2025 79.4  >60.0 mL/min/1.73 Final    Total Cholesterol 04/14/2025 160  0 - 200 mg/dL Final    Triglycerides 04/14/2025 294 (H)  0 - 150 mg/dL Final    HDL Cholesterol 04/14/2025 34 (L)  40 - 60 mg/dL Final    LDL Cholesterol  04/14/2025 78  0 - 100 mg/dL Final    VLDL Cholesterol 04/14/2025 48 (H)  5 - 40 mg/dL Final    LDL/HDL Ratio 04/14/2025 1.98   Final    Microalbumin/Creatinine Ratio 04/14/2025    Final    Unable to calculate    Creatinine, Urine 04/14/2025 103.6  mg/dL Final    Microalbumin, Urine 04/14/2025 <1.2  mg/dL Final    TSH 04/14/2025 1.940  0.270 - 4.200 uIU/mL Final    Vitamin B-12 04/14/2025 494  211 - 946 pg/mL Final    25 Hydroxy, Vitamin D 04/14/2025 60.7  30.0 - 100.0 ng/ml Final     Outside labs from 09/28/2024 reviewed and summarized in HPI    ASSESSMENT/PLAN:  1) Type 2 DM w/o complication at this time: controlled, A1C% was 6.8 today.    - did not tolerate Victoza, Trulicity, or Januvia in the past   - Pt had constipation on Rybelsus 7 mg  - Pt had nausea again on low dose Ozempic. (12/2024) changed back to mounjaro    - Continue metformin 1000 mg BID   - Increase Mounjaro to 7.5 mg weekly. Reviewed potential GI side effects and advised pt to take stool softener regularly    2) idiopathic hypoparathyroidism: controlled  - CMP today, Calcium 9.2 today  - d/w pt that goal calcium is at the lower end of the range to minimize calcium-phosphorus product deposition  - continue calcitriol 0.5 mcg daily, Caltrate + D 600 mg BID w/ food  - HCTZ 25 mg daily for HTN and to reduce hypercalciuria   - Stopping ergocalciferol    3) mixed hyperlipidemia: controlled  Lipid Panel (04/2025):  Tchol 160, , HDL 34 , LDL 78  - continue atorvastatin 40 mg nightly  - Continue fenofibrate 145 mg daily     4) essential HTN:  - systolic BP low 100's past two visits   - will discontinue lisinopril 10 mg daily  - Continue HCTZ 25 mg daily    5) vitamin D deficiency:   - vitamin D 25 -OH today 60.7  - will d/c ergocalciferol 50,000 IU weekly at this time  RTC 6 months    Electronically Signed: Therese Cintron MD

## 2025-04-15 LAB
25(OH)D3 SERPL-MCNC: 60.7 NG/ML (ref 30–100)
VIT B12 BLD-MCNC: 494 PG/ML (ref 211–946)

## 2025-05-05 DIAGNOSIS — E78.2 MIXED HYPERLIPIDEMIA: ICD-10-CM

## 2025-05-05 DIAGNOSIS — E11.65 TYPE 2 DIABETES MELLITUS WITH HYPERGLYCEMIA, WITHOUT LONG-TERM CURRENT USE OF INSULIN: ICD-10-CM

## 2025-05-05 RX ORDER — ATORVASTATIN CALCIUM 40 MG/1
40 TABLET, FILM COATED ORAL
Qty: 90 TABLET | Refills: 3 | Status: SHIPPED | OUTPATIENT
Start: 2025-05-05

## 2025-05-05 NOTE — TELEPHONE ENCOUNTER
Rx Refill Note  Requested Prescriptions     Pending Prescriptions Disp Refills    atorvastatin (LIPITOR) 40 MG tablet [Pharmacy Med Name: ATORVASTATIN 40 MG TABLET] 90 tablet 0     Sig: TAKE ONE TABLET BY MOUTH AT BEDTIME    metFORMIN (GLUCOPHAGE) 1000 MG tablet [Pharmacy Med Name: METFORMIN HCL 1,000 MG TABLET] 180 tablet 0     Sig: TAKE ONE TABLET BY MOUTH 2 TIMES A DAY      Last office visit with prescribing clinician: 4/14/2025     Next office visit with prescribing clinician: 9/15/2025     Kaity Del Castillo MA  05/05/25, 12:46 EDT

## 2025-05-19 RX ORDER — FENOFIBRATE 145 MG/1
145 TABLET, FILM COATED ORAL DAILY
Qty: 90 TABLET | Refills: 3 | Status: SHIPPED | OUTPATIENT
Start: 2025-05-19

## 2025-05-19 RX ORDER — CALCITRIOL 0.5 UG/1
0.5 CAPSULE, LIQUID FILLED ORAL DAILY
Qty: 90 CAPSULE | Refills: 3 | Status: SHIPPED | OUTPATIENT
Start: 2025-05-19

## 2025-05-19 RX ORDER — HYDROCHLOROTHIAZIDE 25 MG/1
25 TABLET ORAL DAILY
Qty: 90 TABLET | Refills: 3 | Status: SHIPPED | OUTPATIENT
Start: 2025-05-19

## (undated) DEVICE — [HIGH FLOW INSUFFLATOR,  DO NOT USE IF PACKAGE IS DAMAGED,  KEEP DRY,  KEEP AWAY FROM SUNLIGHT,  PROTECT FROM HEAT AND RADIOACTIVE SOURCES.]: Brand: PNEUMOSURE

## (undated) DEVICE — APPL CHLORAPREP TINTED 26ML TEAL

## (undated) DEVICE — APL DUPLOSPRAYER MIS 40CM

## (undated) DEVICE — SHT AIR TRANSFR COMFRT GLIDE LAT 40X80IN

## (undated) DEVICE — ENDOPATH XCEL UNIVERSAL TROCAR STABLILITY SLEEVES: Brand: ENDOPATH XCEL

## (undated) DEVICE — Device: Brand: DEFENDO AIR/WATER/SUCTION AND BIOPSY VALVE

## (undated) DEVICE — CONTN GRAD MEAS TRIANG 32OZ BLK

## (undated) DEVICE — BLANKT WARM UPPR/BDY ARM/OUT 57X196CM

## (undated) DEVICE — ENSEAL LAPAROSCOPIC TISSUE SEALER G2 ARTICULATING CURVED JAW FOR USE WITH G2 GENERATOR 5MM DIAMETER 45CM SHAFT LENGTH: Brand: ENSEAL

## (undated) DEVICE — GOWN,NON-REINFORCED,SIRUS,SET IN SLV,XXL: Brand: MEDLINE

## (undated) DEVICE — SYR LUERLOK 30CC

## (undated) DEVICE — LAPAROSCOPIC SMOKE FILTRATION SYSTEM: Brand: PALL LAPAROSHIELD® PLUS LAPAROSCOPIC SMOKE FILTRATION SYSTEM

## (undated) DEVICE — PATIENT RETURN ELECTRODE, SINGLE-USE, CONTACT QUALITY MONITORING, ADULT, WITH 9FT CORD, FOR PATIENTS WEIGING OVER 33LBS. (15KG): Brand: MEGADYNE

## (undated) DEVICE — ENDOPATH XCEL BLADELESS TROCARS WITH STABILITY SLEEVES: Brand: ENDOPATH XCEL

## (undated) DEVICE — ANTIBACTERIAL VIOLET BRAIDED (POLYGLACTIN 910), SYNTHETIC ABSORBABLE SUTURE: Brand: COATED VICRYL

## (undated) DEVICE — INTENDED USE FOR SURGICAL MARKING ON INTACT SKIN, ALSO PROVIDES A PERMANENT METHOD OF IDENTIFYING OBJECTS IN THE OPERATING ROOM: Brand: WRITESITE® REGULAR TIP SKIN MARKER

## (undated) DEVICE — PENROSE DRAIN 18 X .5" SILICONE: Brand: MEDLINE

## (undated) DEVICE — CONN TBG Y 5 IN 1 LF STRL

## (undated) DEVICE — DEV WARMR SCPE LIQUIDSCOPEWARMOR 2BUTN SHT NON/DEHP/ALC STRL

## (undated) DEVICE — UNDRPD COMFRT GLD DRYPAD 36X57IN

## (undated) DEVICE — ENDOPATH 5MM ENDOSCOPIC BLUNT TIP DISSECTORS (12 POUCHES CONTAINING 3 DISSECTORS EACH): Brand: ENDOPATH

## (undated) DEVICE — GLV SURG SENSICARE PI MIC PF SZ9 LF STRL

## (undated) DEVICE — Device: Brand: STANDARD BOUGIE, 38FR

## (undated) DEVICE — TROCAR: Brand: KII FIOS FIRST ENTRY

## (undated) DEVICE — KT CLN CLEANOR SCPE

## (undated) DEVICE — GLV SURG SENSICARE PI MIC PF SZ8.5 LF STRL

## (undated) DEVICE — PK BARIATRIC 10

## (undated) DEVICE — TROC STANDARDTROCAR FOR/TITANSGS 19MM DISP STRL